# Patient Record
Sex: MALE | Race: WHITE | NOT HISPANIC OR LATINO | Employment: UNEMPLOYED | URBAN - METROPOLITAN AREA
[De-identification: names, ages, dates, MRNs, and addresses within clinical notes are randomized per-mention and may not be internally consistent; named-entity substitution may affect disease eponyms.]

---

## 2018-08-01 ENCOUNTER — NON-PROVIDER VISIT (OUTPATIENT)
Dept: URGENT CARE | Facility: PHYSICIAN GROUP | Age: 58
End: 2018-08-01
Payer: MEDICAID

## 2018-08-01 DIAGNOSIS — Z02.1 PRE-EMPLOYMENT DRUG SCREENING: ICD-10-CM

## 2018-08-01 LAB
AMP AMPHETAMINE: NORMAL
COC COCAINE: NORMAL
INT CON NEG: NORMAL
INT CON POS: NORMAL
MET METHAMPHETAMINES: NORMAL
OPI OPIATES: NORMAL
PCP PHENCYCLIDINE: NORMAL
POC DRUG COMMENT 753798-OCCUPATIONAL HEALTH: NEGATIVE
THC: NORMAL

## 2018-08-01 PROCEDURE — 80305 DRUG TEST PRSMV DIR OPT OBS: CPT | Performed by: EMERGENCY MEDICINE

## 2019-04-21 ENCOUNTER — HOSPITAL ENCOUNTER (EMERGENCY)
Facility: MEDICAL CENTER | Age: 59
End: 2019-04-21
Attending: EMERGENCY MEDICINE
Payer: MEDICAID

## 2019-04-21 ENCOUNTER — APPOINTMENT (OUTPATIENT)
Dept: RADIOLOGY | Facility: MEDICAL CENTER | Age: 59
End: 2019-04-21
Attending: EMERGENCY MEDICINE
Payer: MEDICAID

## 2019-04-21 VITALS
BODY MASS INDEX: 27.77 KG/M2 | OXYGEN SATURATION: 97 % | RESPIRATION RATE: 18 BRPM | SYSTOLIC BLOOD PRESSURE: 165 MMHG | HEART RATE: 93 BPM | WEIGHT: 194 LBS | TEMPERATURE: 97.9 F | HEIGHT: 70 IN | DIASTOLIC BLOOD PRESSURE: 112 MMHG

## 2019-04-21 DIAGNOSIS — R29.6 FALLS FREQUENTLY: Primary | ICD-10-CM

## 2019-04-21 DIAGNOSIS — R42 LIGHTHEADEDNESS: ICD-10-CM

## 2019-04-21 DIAGNOSIS — R42 DIZZINESS: ICD-10-CM

## 2019-04-21 DIAGNOSIS — S16.1XXA STRAIN OF NECK MUSCLE, INITIAL ENCOUNTER: ICD-10-CM

## 2019-04-21 LAB
ALBUMIN SERPL BCP-MCNC: 4.4 G/DL (ref 3.2–4.9)
ALBUMIN/GLOB SERPL: 1.4 G/DL
ALP SERPL-CCNC: 90 U/L (ref 30–99)
ALT SERPL-CCNC: 29 U/L (ref 2–50)
ANION GAP SERPL CALC-SCNC: 13 MMOL/L (ref 0–11.9)
AST SERPL-CCNC: 20 U/L (ref 12–45)
BASOPHILS # BLD AUTO: 1.4 % (ref 0–1.8)
BASOPHILS # BLD: 0.1 K/UL (ref 0–0.12)
BILIRUB SERPL-MCNC: 0.7 MG/DL (ref 0.1–1.5)
BUN SERPL-MCNC: 22 MG/DL (ref 8–22)
CALCIUM SERPL-MCNC: 9.7 MG/DL (ref 8.5–10.5)
CHLORIDE SERPL-SCNC: 98 MMOL/L (ref 96–112)
CO2 SERPL-SCNC: 27 MMOL/L (ref 20–33)
CREAT SERPL-MCNC: 1.08 MG/DL (ref 0.5–1.4)
EKG IMPRESSION: NORMAL
EOSINOPHIL # BLD AUTO: 0.26 K/UL (ref 0–0.51)
EOSINOPHIL NFR BLD: 3.6 % (ref 0–6.9)
ERYTHROCYTE [DISTWIDTH] IN BLOOD BY AUTOMATED COUNT: 44 FL (ref 35.9–50)
GLOBULIN SER CALC-MCNC: 3.1 G/DL (ref 1.9–3.5)
GLUCOSE BLD-MCNC: 295 MG/DL (ref 65–99)
GLUCOSE SERPL-MCNC: 301 MG/DL (ref 65–99)
HCT VFR BLD AUTO: 51.9 % (ref 42–52)
HGB BLD-MCNC: 17.2 G/DL (ref 14–18)
IMM GRANULOCYTES # BLD AUTO: 0.02 K/UL (ref 0–0.11)
IMM GRANULOCYTES NFR BLD AUTO: 0.3 % (ref 0–0.9)
LYMPHOCYTES # BLD AUTO: 1.49 K/UL (ref 1–4.8)
LYMPHOCYTES NFR BLD: 20.7 % (ref 22–41)
MCH RBC QN AUTO: 28.3 PG (ref 27–33)
MCHC RBC AUTO-ENTMCNC: 33.1 G/DL (ref 33.7–35.3)
MCV RBC AUTO: 85.4 FL (ref 81.4–97.8)
MONOCYTES # BLD AUTO: 0.41 K/UL (ref 0–0.85)
MONOCYTES NFR BLD AUTO: 5.7 % (ref 0–13.4)
NEUTROPHILS # BLD AUTO: 4.92 K/UL (ref 1.82–7.42)
NEUTROPHILS NFR BLD: 68.3 % (ref 44–72)
NRBC # BLD AUTO: 0 K/UL
NRBC BLD-RTO: 0 /100 WBC
PLATELET # BLD AUTO: 185 K/UL (ref 164–446)
PMV BLD AUTO: 11.1 FL (ref 9–12.9)
POTASSIUM SERPL-SCNC: 4.2 MMOL/L (ref 3.6–5.5)
PROT SERPL-MCNC: 7.5 G/DL (ref 6–8.2)
RBC # BLD AUTO: 6.08 M/UL (ref 4.7–6.1)
SODIUM SERPL-SCNC: 138 MMOL/L (ref 135–145)
TROPONIN I SERPL-MCNC: <0.02 NG/ML (ref 0–0.04)
WBC # BLD AUTO: 7.2 K/UL (ref 4.8–10.8)

## 2019-04-21 PROCEDURE — 82962 GLUCOSE BLOOD TEST: CPT

## 2019-04-21 PROCEDURE — 80053 COMPREHEN METABOLIC PANEL: CPT

## 2019-04-21 PROCEDURE — 85025 COMPLETE CBC W/AUTO DIFF WBC: CPT

## 2019-04-21 PROCEDURE — 84484 ASSAY OF TROPONIN QUANT: CPT

## 2019-04-21 PROCEDURE — 70450 CT HEAD/BRAIN W/O DYE: CPT

## 2019-04-21 PROCEDURE — 93005 ELECTROCARDIOGRAM TRACING: CPT | Performed by: EMERGENCY MEDICINE

## 2019-04-21 PROCEDURE — 93005 ELECTROCARDIOGRAM TRACING: CPT

## 2019-04-21 PROCEDURE — 72125 CT NECK SPINE W/O DYE: CPT

## 2019-04-21 PROCEDURE — 99284 EMERGENCY DEPT VISIT MOD MDM: CPT

## 2019-04-21 NOTE — ED PROVIDER NOTES
ED Provider Note    CHIEF COMPLAINT  Chief Complaint   Patient presents with   • Dizziness   • GLF       HPI  Marcello Perez is a 59 y.o. male who presents with dizziness and head injury.  He has had a history of dizziness where he feels like he is falling off to the side.  She talked to his doctor about it but has not had a full workup yet.  Yesterday he got dizzy felt like is going to fall over hit his head on the doorknob.  After that he did have some headache he does get some tingling up the side of his neck bilaterally.  He has no numbness tingling or weakness in his arms or legs no difficulty with speech or ambulation.  His eyes feel swollen he says he has no nausea or vomiting he does have some ringing in the ears on the left right.  He slept okay felt good this morning and had another dizzy episode came in for evaluation.  He does have diabetes on oral medications and apparently is having difficulty getting the right balance of medications for his blood sugar.  All other systems are negative    REVIEW OF SYSTEMS  See HPI for further details    PAST MEDICAL HISTORY  Past Medical History:   Diagnosis Date   • Bell's palsy    • Diabetes     diet controlled   • Hypertension    • Sleep apnea        FAMILY HISTORY  History reviewed. No pertinent family history.    SOCIAL HISTORY  Social History     Social History   • Marital status: Single     Spouse name: N/A   • Number of children: N/A   • Years of education: N/A     Social History Main Topics   • Smoking status: Never Smoker   • Smokeless tobacco: Never Used   • Alcohol use Yes      Comment: occ   • Drug use: No   • Sexual activity: Not on file     Other Topics Concern   • Not on file     Social History Narrative   • No narrative on file       SURGICAL HISTORY  Past Surgical History:   Procedure Laterality Date   • NASAL SEPTAL RECONST         CURRENT MEDICATIONS  Home Medications     Reviewed by Jasen Stephens R.N. (Registered Nurse) on 04/21/19 at  "1420  Med List Status: Not Addressed   Medication Last Dose Status   amlodipine (NORVASC) 5 MG Tab  Active   hydrocodone-acetaminophen (NORCO) 5-325 MG Tab per tablet  Active   lisinopril (PRINIVIL, ZESTRIL) 40 MG tablet  Active   spironolactone (ALDACTONE) 25 MG Tab  Active                ALLERGIES  Allergies   Allergen Reactions   • Hctz [Hydrochlorothiazide]      Rash at throat.        PHYSICAL EXAM  VITAL SIGNS: /111   Pulse (!) 105   Temp 36.6 °C (97.9 °F) (Temporal)   Resp 14   Ht 1.778 m (5' 10\")   Wt 88 kg (194 lb 0.1 oz)   SpO2 95%   BMI 27.84 kg/m²     Constitutional: Patient is alert and oriented x3 in no distress   HENT: Small abrasion in the center of the upper forehead moist mucous membranes  Eyes:   No conjunctivitis or icterus  Neck: Trach is midline on tender cervical spine  Lymphatic: Negative anterior cervical lymphadenopathy  Cardiovascular: Normal heart rate    Thorax & Lungs: Clear to auscultation  Abdomen: Soft and nontender  Neurologic: Motor 5/5 in the upper lower extremities bilaterally he has normal station and gait tandem walk normal Romberg normal finger to nose testing and normal pronator drift testing.  His cranial nerves: Pupils equally round reactive to light extraocular movements are intact he has no facial asymmetry to motor or sensation no tongue deviation and he has a symmetric palate  Extremities: Full range of motion atraumatic  Psychiatric: Affect normal, Judgment normal, Mood normal.       Results for orders placed or performed during the hospital encounter of 04/21/19   CBC WITH DIFFERENTIAL   Result Value Ref Range    WBC 7.2 4.8 - 10.8 K/uL    RBC 6.08 4.70 - 6.10 M/uL    Hemoglobin 17.2 14.0 - 18.0 g/dL    Hematocrit 51.9 42.0 - 52.0 %    MCV 85.4 81.4 - 97.8 fL    MCH 28.3 27.0 - 33.0 pg    MCHC 33.1 (L) 33.7 - 35.3 g/dL    RDW 44.0 35.9 - 50.0 fL    Platelet Count 185 164 - 446 K/uL    MPV 11.1 9.0 - 12.9 fL    Neutrophils-Polys 68.30 44.00 - 72.00 %    " Lymphocytes 20.70 (L) 22.00 - 41.00 %    Monocytes 5.70 0.00 - 13.40 %    Eosinophils 3.60 0.00 - 6.90 %    Basophils 1.40 0.00 - 1.80 %    Immature Granulocytes 0.30 0.00 - 0.90 %    Nucleated RBC 0.00 /100 WBC    Neutrophils (Absolute) 4.92 1.82 - 7.42 K/uL    Lymphs (Absolute) 1.49 1.00 - 4.80 K/uL    Monos (Absolute) 0.41 0.00 - 0.85 K/uL    Eos (Absolute) 0.26 0.00 - 0.51 K/uL    Baso (Absolute) 0.10 0.00 - 0.12 K/uL    Immature Granulocytes (abs) 0.02 0.00 - 0.11 K/uL    NRBC (Absolute) 0.00 K/uL   COMP METABOLIC PANEL   Result Value Ref Range    Sodium 138 135 - 145 mmol/L    Potassium 4.2 3.6 - 5.5 mmol/L    Chloride 98 96 - 112 mmol/L    Co2 27 20 - 33 mmol/L    Anion Gap 13.0 (H) 0.0 - 11.9    Glucose 301 (H) 65 - 99 mg/dL    Bun 22 8 - 22 mg/dL    Creatinine 1.08 0.50 - 1.40 mg/dL    Calcium 9.7 8.5 - 10.5 mg/dL    AST(SGOT) 20 12 - 45 U/L    ALT(SGPT) 29 2 - 50 U/L    Alkaline Phosphatase 90 30 - 99 U/L    Total Bilirubin 0.7 0.1 - 1.5 mg/dL    Albumin 4.4 3.2 - 4.9 g/dL    Total Protein 7.5 6.0 - 8.2 g/dL    Globulin 3.1 1.9 - 3.5 g/dL    A-G Ratio 1.4 g/dL   ESTIMATED GFR   Result Value Ref Range    GFR If African American >60 >60 mL/min/1.73 m 2    GFR If Non African American >60 >60 mL/min/1.73 m 2   ACCU-CHEK GLUCOSE   Result Value Ref Range    Glucose - Accu-Ck 295 (H) 65 - 99 mg/dL   EKG (NOW)   Result Value Ref Range    Report       [facility] Emergency Dept.    Test Date:  2019  Pt Name:    JEAN CARLOS MIKE             Department: ER  MRN:        [id number]                      Room:  Gender:     Male                         Technician: [id number]  :        1960                   Requested By:ER TRIAGE PROTOCOL  Order #:    [id number]                    Reading MD:    Measurements  Intervals                                Axis  Rate:       100                          P:          41  NE:         167                          QRS:        52  QRSD:       87                            T:          1  QT:         358  QTc:        462    Interpretive Statements  Sinus tachycardia  Compared to ECG 05/18/2012 14:40:56  Sinus rhythm no longer present  Prolonged QT interval no longer present        CT-CSPINE WITHOUT PLUS RECONS   Final Result      No CT evidence of acute cervical spine abnormality.      CT-HEAD W/O   Final Result      No acute intracranial findings.               COURSE & MEDICAL DECISION MAKING  Pertinent Labs & Imaging studies reviewed. (See chart for details)  Patient's had a chronic episode of intermittent dizziness described as falling with trauma to the head and now some neck symptoms.  CTA head and neck will be obtained to rule out trauma he is having trouble with his blood sugars I will assess his blood glucose as well.    Patient CT scan is without signs of trauma.  His lab work is unremarkable except a very slight anion gap elevation and a glucose of 301.    It is known he is having difficulty controlling his blood sugar with outpatient treatment his physicians are working on that.  He did have a fall leaning to the left today's had multiple falls over the last 6 months is talked to his doctor but no formal workup is been initiated due to the recurrence of the fall today I have ordered an outpatient MRI with and without contrast.  He will return if worsening symptoms and follow-up with his primary care physician on the results of that MRI he is given return precautions as well    FINAL IMPRESSION  1.   1. Dizziness    2. Lightheadedness    3. Strain of neck muscle, initial encounter        2.   3.         Electronically signed by: Angel Springer, 4/21/2019 3:11 PM

## 2019-04-21 NOTE — ED TRIAGE NOTES
Pt ambulatory to triage. Pt c/o dizziness that caused him to fall last night. +hit forehead, -LOC. Pt states he feels weird today. Pt does not take blood thinner. Pt states his PCP is changing his DM medication. BS has been high. FSBG 295.    Chief Complaint   Patient presents with   • Dizziness   • GLF     Pt placed in lobby, updated on triage process. Pt educated to notified RN or triage tech if changes in condition.

## 2019-04-22 NOTE — CONSULTS
"BRIEF TELEPHONE NEUROLOGY CONSULTATION NOTE:     The following is a brief summary of the phone consultation. I could not evaluate the patient myself since I did not have any face-to-face access to the patient. All examination and evaluation of the patient and information gathering from the patient and/or the family is entirely done solely by the requesting physician, Dr. Angel Springer M.D..  My recommendations were based on the information provided to me over the phone.     59 yr old w/ dizziness and L ear tinnitus ongoing for 6 months which has not been worked up yet. He fell today and came to the ED. CT head and cervical reported unremarkable. Neuro exam by Dr. Springer reported unremarkable.     Meniere's vs BPPV vs mass.     Recommendations:   - MRI brain w/ and w/o contrast as outpatient.  - F/U w/ ENT or primary.     The above was discussed with Dr. Angel Springer M.D..     Natanael Carlton MD  Acute Neurology Consultant  Tiger Text ID \"Henrique Carlton\"    "

## 2019-11-15 ENCOUNTER — TELEPHONE (OUTPATIENT)
Dept: VASCULAR LAB | Facility: MEDICAL CENTER | Age: 59
End: 2019-11-15

## 2019-11-25 ENCOUNTER — OFFICE VISIT (OUTPATIENT)
Dept: VASCULAR LAB | Facility: MEDICAL CENTER | Age: 59
End: 2019-11-25
Attending: FAMILY MEDICINE
Payer: MEDICAID

## 2019-11-25 VITALS
HEART RATE: 81 BPM | SYSTOLIC BLOOD PRESSURE: 155 MMHG | WEIGHT: 192.6 LBS | BODY MASS INDEX: 26.96 KG/M2 | HEIGHT: 71 IN | DIASTOLIC BLOOD PRESSURE: 91 MMHG

## 2019-11-25 DIAGNOSIS — G47.33 OSA (OBSTRUCTIVE SLEEP APNEA): ICD-10-CM

## 2019-11-25 DIAGNOSIS — E11.65 UNCONTROLLED TYPE 2 DIABETES MELLITUS WITH HYPERGLYCEMIA (HCC): ICD-10-CM

## 2019-11-25 DIAGNOSIS — E55.9 VITAMIN D DEFICIENCY: ICD-10-CM

## 2019-11-25 DIAGNOSIS — I1A.0 RESISTANT HYPERTENSION: ICD-10-CM

## 2019-11-25 DIAGNOSIS — E11.3553 STABLE PROLIFERATIVE DIABETIC RETINOPATHY OF BOTH EYES ASSOCIATED WITH TYPE 2 DIABETES MELLITUS (HCC): ICD-10-CM

## 2019-11-25 PROCEDURE — 99204 OFFICE O/P NEW MOD 45 MIN: CPT | Performed by: FAMILY MEDICINE

## 2019-11-25 PROCEDURE — 99212 OFFICE O/P EST SF 10 MIN: CPT | Performed by: FAMILY MEDICINE

## 2019-11-25 RX ORDER — TELMISARTAN 20 MG/1
TABLET ORAL
Refills: 0 | COMMUNITY
Start: 2019-09-11 | End: 2019-11-25

## 2019-11-25 RX ORDER — TELMISARTAN 40 MG/1
40 TABLET ORAL DAILY
COMMUNITY
End: 2019-11-25

## 2019-11-25 RX ORDER — METFORMIN HYDROCHLORIDE 500 MG/1
500 TABLET, EXTENDED RELEASE ORAL DAILY
Qty: 90 TAB | Refills: 3 | Status: SHIPPED | OUTPATIENT
Start: 2019-11-25 | End: 2020-06-19

## 2019-11-25 RX ORDER — TELMISARTAN 80 MG/1
80 TABLET ORAL
Qty: 90 TAB | Refills: 3 | Status: SHIPPED
Start: 2019-11-25 | End: 2019-12-30 | Stop reason: SDUPTHER

## 2019-11-25 RX ORDER — AMLODIPINE BESYLATE 5 MG/1
5 TABLET ORAL
Qty: 90 TAB | Refills: 3 | Status: SHIPPED | OUTPATIENT
Start: 2019-11-25 | End: 2019-12-06

## 2019-11-25 RX ORDER — CLONIDINE 0.1 MG/24H
1 PATCH, EXTENDED RELEASE TRANSDERMAL
COMMUNITY
End: 2019-11-25

## 2019-11-25 RX ORDER — GEMFIBROZIL 600 MG/1
TABLET, FILM COATED ORAL
COMMUNITY
Start: 2014-04-02 | End: 2019-11-25

## 2019-11-25 RX ORDER — HYDROCHLOROTHIAZIDE 25 MG/1
25 TABLET ORAL DAILY
COMMUNITY
End: 2019-11-25

## 2019-11-25 RX ORDER — ASPIRIN 81 MG/1
TABLET, CHEWABLE ORAL
COMMUNITY
Start: 2013-12-17 | End: 2021-03-29

## 2019-11-25 RX ORDER — CARVEDILOL 12.5 MG/1
TABLET ORAL
COMMUNITY
End: 2019-11-25

## 2019-11-25 RX ORDER — CHLORTHALIDONE 25 MG/1
25 TABLET ORAL DAILY
Qty: 90 TAB | Refills: 3 | Status: SHIPPED | OUTPATIENT
Start: 2019-11-25 | End: 2020-11-19

## 2019-11-25 ASSESSMENT — ENCOUNTER SYMPTOMS
HEMOPTYSIS: 0
BLURRED VISION: 1
DEPRESSION: 0
ORTHOPNEA: 0
WHEEZING: 0
VOMITING: 0
SHORTNESS OF BREATH: 0
CHILLS: 0
DIZZINESS: 1
NERVOUS/ANXIOUS: 1
ABDOMINAL PAIN: 0
TREMORS: 0
DIARRHEA: 0
BACK PAIN: 1
SORE THROAT: 0
MYALGIAS: 0
BRUISES/BLEEDS EASILY: 0
DOUBLE VISION: 0
EYE DISCHARGE: 0
FLANK PAIN: 1
NAUSEA: 0
SEIZURES: 0
BLOOD IN STOOL: 0
PALPITATIONS: 0
WEAKNESS: 0
HEADACHES: 0
INSOMNIA: 0
FOCAL WEAKNESS: 0
COUGH: 0
FEVER: 0

## 2019-11-25 NOTE — PROGRESS NOTES
RESISTANT HTN CLINIC - INITIAL EVALUATION   11/25/19    Assessment / Plan:     1. Blood Pressure Control:  Qualifies as resistant HTN: yes - 3+ meds w/o control   Office BP Goal ACC/AHA (2017) goal <130/80  Home BP at goal:  no  Office BP at goal:  No, definite component of white coat efect   Echo: pending  ACR: pending  Device candidate? Maybe   Plan:   Monitoring:   - start/continue home BP monitoring, reviewed correct technique:  Yes   - order 24h ABPM: 11/25/19  - monitor lytes/gfr routinely   - advised that stabilizing BP may require multiple med changes and close monitoring over the next several months, reviewed that initially there will be additional imaging and labs and the possibility of adverse drug rxn's and variability of his BP and HR until we have things stabilized.  Advised to contact our office as needed for questions or concerns.      2. Work up of Secondary Causes of Resistant Hypertension:   Renovascular HTN: Not Yet Assessed  Primary Aldosteronism: Not Yet Assessed  Thyroid Function: Not Yet Assessed  Obstructive Sleep Apnea: on CPAP, working on mask fitting for adherence   Pheochromocytoma: Not Yet Assessed   Cushing's:   Not Yet Assessed   Other:   1) anxiety - still under counseling at [facility], co-morbid with eye conditions, recent job stress    Recommendations At This Visit: labs, echo, CRISSY duplex         3. Medication Use / Adherence:  Assessment: Complete  Recommendations: Instructed to Continue Taking All Medications As Prescribed         4. End Organ Damage:   Left Ventricular Hypertrophy: Not Assessed on  Echocardiogram Date: pending  Albuminuria: unknown  on Date: pending   Renal Function: Chronic Kidney Disease  requested labs   Established Cardiovascular Disease: None    5. Lifestyle Recommendations:    Smoking: continued complete avoidance of all tobacco products     Physical Activity: continue healthy activity to improve CV fitness, see care instructions for additional details          Weight Management and Nutrition: Dietary plan was discussed with patient at this visit including DASH, low sodium and/or as outlined in care instructions     6. Standard HTN Pharmacotherapy:  ACEI/ARB: increase telmisartan to 80mg QHS  Thiazide Type Diuretic: stop HCTZ, start chlorthalidone 25mg QAM, monitor lytes  Calcium Channel Blocker (CCB): start amlodipine 5mg QHS    7. Additional Agents:  Aldosterone Antagonist: add as 4th agent   Loop Diuretic: not indicated   Peripheral Alpha Blocker: add as 5th agent  Other CCB: not indicated   Direct Vasodilator: not indicated   Centrally Acting Alpha Agonist: use clonidine 0.1mg for BP spikes >160/>110 only, will try to avoid due to potential for rebound HTN   Other:   BB: not indicated   DRI: not indicated      8. Other CV Risk Factors:     1) LIPID MANAGEMENT:  NLA Risk Category:   Very high:  T2D with 2 or more RFs or evidence of end-org damage (CKD, ACR>29, retinopathy)  Tx threshold:  non-HDL-C >99, LDL-C >69  Tx goal: non-HDL-C <100,  LDL-C <70  (optional: apoB <80)  At goal: ?   Plan:  - reinforced ongoing TLC measures   - update advanced labs  - will need to be on mod-intensity statin at next visit  - verify Vit D level stable     2) GLYCEMIA MANAGEMENT:  Diabetic, T2, ophthalmic, non-insulin   Last A1c = 7.1  Goal A1c < 7.5, optimal <7.0  ACR: unclear   Plan:  - glyxambi 10/5 daily, titrate to full strength   - defer overall mgmt to YULISA PCP  - start metformin 500mg ER, consider increase to up to 1000mg ER if tolerable   - recommmend for routine care with PCP (or endocrine) to include regular A1c monitoring, annual albumin/creatinine ratio (ACR), annual diabetic retinopathy screening, foot exams, annual flu vaccine, and updates to pneumonia vaccines as appropriate     3) ANTIPLATELET/ANTICOAGULANT THERAPY:   - not currently indicated for now       9. Other Issues:    1) DM retinopathy, proliferative  - continue care with ophthalmo for injections routinely      2) BARBARA on CPAP, having trouble with tolerance  - increasing use will help with modest reduction SBP   - defer mgmt to sleep MD      Studies Ordered At This Visit: echo, CRISSY duplex - ordered, aprn to track , 24h ABPM in 1 month (MA to track)   Blood Work to Be Obtained Prior to Next Visit: advanced lipid, cmp, acr, tsh, cortisol, konstantin/renin, UA, cbc  Disposition: RTC in 5 weeks with me     Diagnosis:  1. Resistant hypertension  US-RENAL ARTERY DUPLEX COMP    MICROALBUMIN CREAT RATIO URINE    ALDOSTERONE    CBC WITHOUT DIFFERENTIAL    CORTISOL    METANEPHRINES PLASMA    RENIN ACTIVITY    TSH WITH REFLEX TO FT4    URINALYSIS,CULTURE IF INDICATED    EC-ECHOCARDIOGRAM COMPLETE W/O CONT    REFERRAL TO 24-HOUR BLOOD PRESSURE MONITORING   2. Uncontrolled type 2 diabetes mellitus with hyperglycemia (HCC)  LipoFit by NMR    LIPOPROTEIN A    LDL, DIRECT    CRP HIGH SENSITIVE (CARDIAC)    Comp Metabolic Panel    MICROALBUMIN CREAT RATIO URINE    CORTISOL    URINALYSIS,CULTURE IF INDICATED   3. BARBARA (obstructive sleep apnea)     4. Vitamin D deficiency  VITAMIN D,25 HYDROXY   5. Stable proliferative diabetic retinopathy of both eyes associated with type 2 diabetes mellitus (HCC)          History of Present Illness:   Marcello Richardsonblay is male seen for evaluation of resistant HTN and management. Marcello Richardsonblay is referred by: Owen Valles D.O..    HTN:  Current HTN concerns: ongoing elevated BP despite multiple medications   Current ADRs: No  HTN sx:  Ongoing blurred vision due to underlying DR.  No chest pain, shortness of breath, headache, nausea.  Occasional intermittent vertigo   Home BP los/80s  24h ABPM completed: not tested  Adherence to current HTN meds: compliant all of the time     Antiplatelet/anticoagulation:   No     Hyperlipidemia:    No prior statin, reports HDL has been good in the past. No prior ASCVD  Current treatment: TLC only  Myalgias? Not applicable  Other adverse drug reactions?  Not applicable  Lipid profile: no recent labs available at time of visit    T2D:  Overall stable with diet, exercise.   Currently off Tresiba.   Eye injections Q6wks (Dr. Elizabeth)  Stable. No current symptoms reported.   Tolerating meds and no recent med changes.   Home blood sugars stable, reports no lows.   Last A1c 7.1%    Pertinent HTN hx (reviewed at initial visit):   Age at HTN dx: [age]  Past HTN medications: lisino, spirono/hctz  HTN crises:  No prior hospitalization or crises, mostly DM related    Lifestyle factors:     High salt: Yes, Details: regular, trying w/o salt.  no table salt, uses salt in cooking    EtOH: Yes, Details: social only  Interfering substances:     NSAIDs: No    Decongestants. No   Stimulants/drugs: No    Clinical evidence of ASCVD:     1) hx of MI/ACS:  no   2) coronary or other revascularization procedure: no   3) TIA/ischemic CVA: no   4) PAD (including BESSIE <0.9): no   5) documented (CAD, Renal athero, AA due to athero, carotid plaque >50% stenosis: no    Major RFs:     1) Age (Men>44, women>54):  yes   2) Fhx early CAD (<55 men, <65 women):  no   3) cigarette smoking:   reports that he has never smoked. He has never used smokeless tobacco.   4) high BP >139/89 or on tx: yes   5) Low HDL-C (<40 men, <50 women): yes , in past   HDL   Date Value Ref Range Status   04/22/2015 28 (L) >39 mg/dL Final     Other ASCVD risk factors:     CKD:  No   Sleeping disorder/BARBARA: Yes, Details: on CPAP   Hypothyroidism: No     Past Medical History:   Diagnosis Date   • Bell's palsy    • Diabetes     diet controlled   • Hypertension    • Sleep apnea       Problem List:     Patient Active Problem List    Diagnosis Date Noted   • BARBARA (obstructive sleep apnea) 04/22/2015   • Uncontrolled type 2 diabetes mellitus with hyperglycemia (HCC) 04/22/2015   • HTN (hypertension) 04/06/2015        Surgical History:     Past Surgical History:   Procedure Laterality Date   • NASAL SEPTAL RECONST          Social  History:     Social History     Socioeconomic History   • Marital status: Single     Spouse name: Not on file   • Number of children: Not on file   • Years of education: Not on file   • Highest education level: Not on file   Occupational History   • Not on file   Social Needs   • Financial resource strain: Not on file   • Food insecurity:     Worry: Not on file     Inability: Not on file   • Transportation needs:     Medical: Not on file     Non-medical: Not on file   Tobacco Use   • Smoking status: Never Smoker   • Smokeless tobacco: Never Used   Substance and Sexual Activity   • Alcohol use: Yes     Comment: occ   • Drug use: No   • Sexual activity: Not on file   Lifestyle   • Physical activity:     Days per week: Not on file     Minutes per session: Not on file   • Stress: Not on file   Relationships   • Social connections:     Talks on phone: Not on file     Gets together: Not on file     Attends Cheondoism service: Not on file     Active member of club or organization: Not on file     Attends meetings of clubs or organizations: Not on file     Relationship status: Not on file   • Intimate partner violence:     Fear of current or ex partner: Not on file     Emotionally abused: Not on file     Physically abused: Not on file     Forced sexual activity: Not on file   Other Topics Concern   • Not on file   Social History Narrative   • Not on file        Family History:     Family History   Problem Relation Age of Onset   • Clotting Disorder Neg Hx    • Stroke Neg Hx    • Heart Disease Neg Hx         Review of Systems:   Review of Systems   Constitutional: Negative for chills, fever and malaise/fatigue.   HENT: Negative for nosebleeds, sore throat and tinnitus.    Eyes: Positive for blurred vision (baseline ). Negative for double vision and discharge.   Respiratory: Negative for cough, hemoptysis, shortness of breath and wheezing.    Cardiovascular: Negative for chest pain, palpitations, orthopnea and leg swelling.  "  Gastrointestinal: Negative for abdominal pain, blood in stool, diarrhea, melena, nausea and vomiting.   Genitourinary: Positive for flank pain. Negative for hematuria.   Musculoskeletal: Positive for back pain. Negative for joint pain and myalgias.   Skin: Negative for itching and rash.   Neurological: Positive for dizziness. Negative for tremors, focal weakness, seizures, weakness and headaches.   Endo/Heme/Allergies: Does not bruise/bleed easily.   Psychiatric/Behavioral: Negative for depression. The patient is nervous/anxious (basline). The patient does not have insomnia.         Objective:   Allergies:  Hctz [hydrochlorothiazide]    Vitals:    11/25/19 0817 11/25/19 0823   BP: 157/91 155/91   BP Location: Left arm Left arm   Patient Position: Sitting Sitting   BP Cuff Size: Adult Adult   Pulse: 73 81   Weight: 87.4 kg (192 lb 9.6 oz)    Height: 1.803 m (5' 11\")      Body mass index is 26.86 kg/m².    BP:   BP Readings from Last 5 Encounters:   11/25/19 155/91   04/21/19 (!) 165/112   09/22/16 (!) 180/99   12/22/15 142/93   12/17/15 136/94        Outpatient Encounter Medications as of 11/25/2019   Medication Sig Dispense Refill   • Empagliflozin-linaGLIPtin (GLYXAMBI) 10-5 MG Tab Take  by mouth.     • aspirin (ASPIRIN 81) 81 MG Chew Tab chewable tablet 1 tablet     • Glucose Blood (FREESTYLE LITE TEST VI) to check     • metFORMIN ER (GLUCOPHAGE XR) 500 MG TABLET SR 24 HR Take 1 Tab by mouth every day for 360 days. 90 Tab 3   • telmisartan (MICARDIS) 80 MG Tab Take 1 Tab by mouth every bedtime for 360 days. 90 Tab 3   • chlorthalidone (HYGROTON) 25 MG Tab Take 1 Tab by mouth every day for 360 days. 90 Tab 3   • amLODIPine (NORVASC) 5 MG Tab Take 1 Tab by mouth every bedtime for 360 days. 90 Tab 3   • [DISCONTINUED] cloNIDine (CATAPRES) 0.1 MG/24HR PATCH WEEKLY patch Apply 1 Patch to skin as directed every 7 days.     • [DISCONTINUED] telmisartan (MICARDIS) 40 MG Tab Take 40 mg by mouth every day.     • " [DISCONTINUED] hydroCHLOROthiazide (HYDRODIURIL) 25 MG Tab Take 25 mg by mouth every day.     • [DISCONTINUED] carvedilol (COREG) 12.5 MG Tab 1     • [DISCONTINUED] metformin (GLUCOPHAGE) 1000 MG tablet metformin     • [DISCONTINUED] Insulin Lispro Prot & Lispro (HUMALOG MIX 50/50 KWIKPEN) (50-50) 100 UNIT/ML Suspension Pen-injector 15 units; increase every 3 days by 1 unit bid until am fasting bgs are consistenly in 120's or less     • [DISCONTINUED] Insulin Pen Needle 29G X 12.7MM Misc use with kwikpen     • [DISCONTINUED] gemfibrozil (LOPID) 600 MG Tab 1 tablet     • [DISCONTINUED] Spironolactone-HCTZ (ALDACTAZIDE PO) spironolacton-hydrochlorothiaz     • [DISCONTINUED] telmisartan (MICARDIS) 20 MG tablet TAKE ONE TABLET BY MOUTH EVERY MORNING FOR BLOOD PRESSURE  0   • [DISCONTINUED] hydrocodone-acetaminophen (NORCO) 5-325 MG Tab per tablet Take 1 Tab by mouth every 6 hours as needed. (Patient not taking: Reported on 11/25/2019) 10 Tab 0   • [DISCONTINUED] lisinopril (PRINIVIL, ZESTRIL) 40 MG tablet Take 0.5 Tabs by mouth 2 times a day. Hold for SBP <100 (Patient not taking: Reported on 11/25/2019) 90 Tab 1   • [DISCONTINUED] amlodipine (NORVASC) 5 MG Tab Take 1 Tab by mouth every day. 30 Tab 11   • [DISCONTINUED] spironolactone (ALDACTONE) 25 MG Tab Take 0.5 Tabs by mouth every day. (Patient not taking: Reported on 11/25/2019) 45 Tab 3     No facility-administered encounter medications on file as of 11/25/2019.      Physical Exam  Vitals signs reviewed.   Constitutional:       Appearance: Normal appearance.   HENT:      Head: Normocephalic and atraumatic.      Nose: Nose normal.      Mouth/Throat:      Mouth: Mucous membranes are moist.      Pharynx: Oropharynx is clear.   Eyes:      Extraocular Movements: Extraocular movements intact.      Conjunctiva/sclera: Conjunctivae normal.   Neck:      Musculoskeletal: Normal range of motion and neck supple.   Cardiovascular:      Rate and Rhythm: Normal rate and regular  rhythm.      Pulses: Normal pulses.           Carotid pulses are 2+ on the right side and 2+ on the left side.       Radial pulses are 2+ on the right side and 2+ on the left side.        Dorsalis pedis pulses are 2+ on the right side and 2+ on the left side.        Posterior tibial pulses are 2+ on the right side and 2+ on the left side.      Heart sounds: Normal heart sounds.      Comments:    Spider telangectasia:       RLE:  Scattered     LLE: scattered   Varicosities:           RLE: none      LLE: reticular only    Corona phlebectatica:      RLE:  None        LLE:  None   Cording:         RLE:  None     LLE: None     Pulmonary:      Effort: Pulmonary effort is normal.      Breath sounds: Normal breath sounds.   Abdominal:      General: Abdomen is flat. Bowel sounds are normal.      Palpations: Abdomen is soft.   Musculoskeletal:      Right lower leg: No edema.      Left lower leg: No edema.   Skin:     General: Skin is warm and dry.      Capillary Refill: Capillary refill takes less than 2 seconds.          Neurological:      General: No focal deficit present.      Mental Status: He is alert and oriented to person, place, and time. Mental status is at baseline.   Psychiatric:         Mood and Affect: Mood normal.         Behavior: Behavior normal.          Accessory Clinical Findings:   Echocardiogram:  No results found for this or any previous visit.            Lab Results   Component Value Date    SODIUM 138 2019    POTASSIUM 4.2 2019    CHLORIDE 98 2019    CO2 27 2019    GLUCOSE 301 (H) 2019    BUN 22 2019    CREATININE 1.08 2019           Lab Results   Component Value Date    STMTRPT  2019     [facility] Emergency Dept.    Test Date:  2019  Pt Name:    JEAN CARLOS MIKE             Department: ER  MRN:        [id number]                      Room:  Gender:     Male                         Technician: [id number]  :        1960                    Requested By:ER TRIAGE PROTOCOL  Order #:    [id number]                    Reading MD:    Measurements  Intervals                                Axis  Rate:       100                          P:          41  VA:         167                          QRS:        52  QRSD:       87                           T:          1  QT:         358  QTc:        462    Interpretive Statements  Sinus tachycardia  Compared to ECG 05/18/2012 14:40:56  Sinus rhythm no longer present  Prolonged QT interval no longer present           Alonso Dover M.D.

## 2019-11-25 NOTE — PATIENT INSTRUCTIONS
1) start new meds   2) check labs when fasting   3) heart and kidney ultrasound     Physical activity:  - engage in moderate to vigorous physical activity such as brisk walking, swimming, cycling, >150 minutes per week at 30-40 minutes per session, 3 to 5 times weekly or as directed at today's visit     General healthly nutrition advice:  - the USDA food pattern (https://www.cnpp.usda.gov/USDAFoodPatterns)  - plate method (https://www.choosemyplate.gov/)  - consume diet that emphasizes intake of vegetables, fruits, and whole grains,  - use low fat diary products, poultry, fish, legumes, nontropical vegetable oils, nuts  - limit intake of sweets, sugar-sweetned beverages, and red meats   - reduce saturated and trans fats to <6% of your daily calories     BP and BP lower diet:  SODIUM RESTRICTIONS: - consume no more than 2,300mg of sodium daily (look at food labels) ,or if you have high BP then reduce to no more than 1,500mg of sodium daily   For more information visit: https://www.Clerk/contents/low-sodium-diet-the-basics/print?veilsFby=5823&source=see_link     - if you notice BP > 140/>90 for more than 3 days, then contact our office for further instructions    - DASH diet   (https://www.heart.org/en/health-topics/high-blood-pressure/changes-you-can-make-to-manage-high-blood-pressure/managing-blood-pressure-with-a-heart-healthy-diet)    Cholesterol-reducing diets:   - AHA diet  (http://www.heart.org/en/healthy-living/healthy-eating/eat-smart/nutrition-basics/aha-diet-and-lifestyle-recommendations)   - Mediterranean diet (http://www.heart.org/en/healthy-living/healthy-eating/eat-smart/nutrition-basics/mediterranean-diet)   - lowering triglycerides: (http://my.americanheart.org/idc/groups/ahamah-public/@[facility]/@sop/@[facility]/documents/downloadable/[facility]_425988.pdf)

## 2019-12-03 ENCOUNTER — TELEPHONE (OUTPATIENT)
Dept: VASCULAR LAB | Facility: MEDICAL CENTER | Age: 59
End: 2019-12-03

## 2019-12-06 ENCOUNTER — TELEPHONE (OUTPATIENT)
Dept: VASCULAR LAB | Facility: MEDICAL CENTER | Age: 59
End: 2019-12-06

## 2019-12-07 NOTE — TELEPHONE ENCOUNTER
Received a msg from the pt that since starting amlodipine, has had major swelling in legs, and perhaps even some swelling in his arms.  He stopped amlodipine yesterday, and already the swelling has subsided.  He states his BPs have been running 110-120/70s, 112/75 this AM.  Pt instructed to continue to hold amlodipine and to call us on Monday with BP readings.  He is in agreement.      PRADIP Tobar  [place] [facility]

## 2019-12-12 ENCOUNTER — TELEPHONE (OUTPATIENT)
Dept: VASCULAR LAB | Facility: MEDICAL CENTER | Age: 59
End: 2019-12-12

## 2019-12-12 NOTE — TELEPHONE ENCOUNTER
LM for pt to call back to discuss BP and meds.        PRADIP Tobar  [place] [facility]

## 2019-12-17 ENCOUNTER — NON-PROVIDER VISIT (OUTPATIENT)
Dept: VASCULAR LAB | Facility: MEDICAL CENTER | Age: 59
End: 2019-12-17
Attending: FAMILY MEDICINE
Payer: MEDICAID

## 2019-12-17 DIAGNOSIS — I15.9 SECONDARY HYPERTENSION: ICD-10-CM

## 2019-12-17 PROCEDURE — 93786 AMBL BP MNTR W/SW REC ONLY: CPT

## 2019-12-17 PROCEDURE — 93788 AMBL BP MNTR W/SW A/R: CPT

## 2019-12-17 PROCEDURE — 93790 AMBL BP MNTR W/SW I&R: CPT | Performed by: INTERNAL MEDICINE

## 2019-12-17 NOTE — NON-PROVIDER
ABPM placed at 0920. Pt will return machine tomorrow around 0930 to ensure we got a full 24 hr reading

## 2019-12-19 NOTE — PROGRESS NOTES
Ambulatory blood pressure monitor results reviewed  Full report under media tab  Reasonable data acquisition    Mean daytime: 117/85 consistent with suboptimally controlled out of office diastolic blood pressure    Nocturnal dip: Appears somewhat blunted    Clinical correlation needed.  We will discuss with patient at follow-up visit    Michael Bloch, MD  [facility]

## 2019-12-30 RX ORDER — TELMISARTAN 80 MG/1
80 TABLET ORAL
Qty: 90 TAB | Refills: 3 | Status: SHIPPED | OUTPATIENT
Start: 2019-12-30 | End: 2020-12-24

## 2020-01-02 ENCOUNTER — OFFICE VISIT (OUTPATIENT)
Dept: VASCULAR LAB | Facility: MEDICAL CENTER | Age: 60
End: 2020-01-02
Attending: FAMILY MEDICINE
Payer: MEDICAID

## 2020-01-02 VITALS
WEIGHT: 192.2 LBS | SYSTOLIC BLOOD PRESSURE: 131 MMHG | HEIGHT: 71 IN | HEART RATE: 81 BPM | BODY MASS INDEX: 26.91 KG/M2 | DIASTOLIC BLOOD PRESSURE: 92 MMHG

## 2020-01-02 DIAGNOSIS — E11.65 UNCONTROLLED TYPE 2 DIABETES MELLITUS WITH HYPERGLYCEMIA (HCC): ICD-10-CM

## 2020-01-02 DIAGNOSIS — E11.3553 STABLE PROLIFERATIVE DIABETIC RETINOPATHY OF BOTH EYES ASSOCIATED WITH TYPE 2 DIABETES MELLITUS (HCC): ICD-10-CM

## 2020-01-02 DIAGNOSIS — E55.9 VITAMIN D DEFICIENCY: ICD-10-CM

## 2020-01-02 DIAGNOSIS — G47.33 OSA (OBSTRUCTIVE SLEEP APNEA): ICD-10-CM

## 2020-01-02 DIAGNOSIS — I1A.0 RESISTANT HYPERTENSION: ICD-10-CM

## 2020-01-02 DIAGNOSIS — E78.5 DYSLIPIDEMIA: ICD-10-CM

## 2020-01-02 PROCEDURE — 99214 OFFICE O/P EST MOD 30 MIN: CPT | Performed by: FAMILY MEDICINE

## 2020-01-02 PROCEDURE — 99212 OFFICE O/P EST SF 10 MIN: CPT | Performed by: FAMILY MEDICINE

## 2020-01-02 ASSESSMENT — ENCOUNTER SYMPTOMS
SHORTNESS OF BREATH: 0
COUGH: 0
NAUSEA: 0
NERVOUS/ANXIOUS: 1
INSOMNIA: 0
TREMORS: 0
BRUISES/BLEEDS EASILY: 0
ORTHOPNEA: 0
DIZZINESS: 1
VOMITING: 0
BLURRED VISION: 1
EYE DISCHARGE: 0
HEADACHES: 0
SEIZURES: 0
FLANK PAIN: 0
FEVER: 0
DIARRHEA: 0
PALPITATIONS: 0
DOUBLE VISION: 0
CHILLS: 0
SORE THROAT: 0
WHEEZING: 0
HEMOPTYSIS: 0
WEAKNESS: 0
BACK PAIN: 1
FOCAL WEAKNESS: 0
MYALGIAS: 0
BLOOD IN STOOL: 0
DEPRESSION: 0
ABDOMINAL PAIN: 0

## 2020-01-02 NOTE — PATIENT INSTRUCTIONS
1) take OTC vitamin D3 50,000 units once weekly for 3 months, then transition to vit D3 4,000 units daily   2) check labs in 3 months   3) continue current meds     Blood pressure and BP-lowering diet:  If you are being treated for blood pressure today: please be advised that stabilizing BP may require multiple med changes and close monitoring over the next several months and initially there may be additional imaging and labs and the possibility of adverse drug reactions. Variability of your blood pressure and heart rate may occur until we have things stabilized.  Please feel free to contact our office as needed for questions or concerns.      SODIUM RESTRICTIONS: - consume no more than 2,300mg of sodium daily (look at food labels) ,or if you have high BP then reduce to no more than 1,500mg of sodium daily   For more information visit: https://www.Pure[employer]/contents/low-sodium-diet-the-basics/print?kpufrGyu=1222&source=see_link     DASH diet   (https://www.heart.org/en/health-topics/high-blood-pressure/changes-you-can-make-to-manage-high-blood-pressure/managing-blood-pressure-with-a-heart-healthy-diet)    - if you notice BP > 140/>90 for more than 3 days, then contact our office for further instructions    Cholesterol-reducing diets:   - AHA diet  (http://www.heart.org/en/healthy-living/healthy-eating/eat-smart/nutrition-basics/aha-diet-and-lifestyle-recommendations)   - Mediterranean diet (http://www.heart.org/en/healthy-living/healthy-eating/eat-smart/nutrition-basics/mediterranean-diet)   - lowering triglycerides: (http://my.americanheart.org/idc/groups/ahamah-public/@wcm/@sop/@[facility]/documents/downloadable/[facility]_425988.pdf)     Physical activity: Unless directed otherwise at today's visit or you are physically incapable due to your current health or medical conditions, it is advised per the American Heart Association to engage in moderate to vigorous physical activity such as brisk walking, swimming, cycling, >150  minutes per week at 30-40 minutes per session, 3 to 5 times weekly.      General healthly nutrition advice:  - the USDA food pattern (https://www.cnpp.usda.gov/USDAFoodPatterns)  - plate method (https://www.choosemyplate.gov/)  - consume diet that emphasizes intake of vegetables, fruits, and whole grains,  - use low fat diary products, poultry, fish, legumes, nontropical vegetable oils, nuts  - limit intake of sweets, sugar-sweetned beverages, and red meats   - reduce saturated and trans fats to <6% of your daily calories

## 2020-01-02 NOTE — NON-PROVIDER
"**Has not completed echo nor US** provided him w/ copy of orders    Pt experiencing hot flashes, blurry vision and tingling sensation in temples/ fingers since starting 80mg telmisartan    Pt states that since starting metformin blood sugars have been going up rather than down. Fasting numbers are usually 135 w/out the metformin. Now they are a little over 260.    Hears like a \"swishing\" noise in ears   "

## 2020-01-02 NOTE — PROGRESS NOTES
RESISTANT HTN CLINIC- FOLLOW-UP EVALUATION   1/2/20    Assessment / Plan:     1. Blood Pressure Control:  Qualifies as resistant HTN: yes - 3+ meds w/o control   Office BP Goal ACC/AHA (2017) goal <130/80  Home BP at goal:  no  Office BP at goal:  No, elevated DBP, definite white coat effect noted    24h ABPM (12/2019): Reasonable data acquisition. Mean daytime: 117/85 consistent with suboptimally controlled out of office diastolic blood pressure. Nocturnal dip: Appears somewhat blunted  Echo: ordered, pending  ACR: A1  Device candidate? Maybe   Plan:   Monitoring:   - continue home BP monitoring, reviewed correct technique:  Yes   - monitor lytes/gfr routinely   - advised that stabilizing BP may require multiple med changes and close monitoring over the next several months, reviewed that initially there will be additional imaging and labs and the possibility of adverse drug rxn's and variability of his BP and HR until we have things stabilized.  Advised to contact our office as needed for questions or concerns.      2. Work up of Secondary Causes of Resistant Hypertension:   Renovascular HTN: Not Yet Assessed  Primary Aldosteronism: Not Yet Assessed  Thyroid Function: Not Yet Assessed  Obstructive Sleep Apnea: on CPAP, working on mask fitting for adherence   Pheochromocytoma: Not Yet Assessed   Cushing's:   Not Yet Assessed   Other:   1) anxiety - still under counseling at [facility], co-morbid with eye conditions, recent job stress    Recommendations At This Visit: labs, echo, CRISSY duplex         3. Medication Use / Adherence:  Assessment: Complete  Recommendations: Instructed to Continue Taking All Medications As Prescribed         4. End Organ Damage:   Left Ventricular Hypertrophy: Not Assessed on  Echocardiogram Date: pending  Albuminuria: unknown  on Date: pending   Renal Function: Chronic Kidney Disease  requested labs   Established Cardiovascular Disease: None    5. Lifestyle Recommendations:    Smoking: continued  complete avoidance of all tobacco products     Physical Activity: continue healthy activity to improve CV fitness, see care instructions for additional details     Weight Management and Nutrition: Dietary plan was discussed with patient at this visit including DASH, low sodium and/or as outlined in care instructions     6. Standard HTN Pharmacotherapy:  ACEI/ARB: increase telmisartan to 80mg QHS, monitor for recurrent ADRs and consider dosage reduction   Thiazide Type Diuretic: continue chlorthalidone 25mg QAM, monitor lytes, previously on HCTZ  Calcium Channel Blocker (CCB): continue amlodipine 5mg QHS    7. Additional Agents:  Aldosterone Antagonist: add as 4th agent   Loop Diuretic: not indicated   Peripheral Alpha Blocker: add as 5th agent  Other CCB: not indicated   Direct Vasodilator: not indicated   Centrally Acting Alpha Agonist: use clonidine 0.1mg for BP spikes >160/>110 only, will try to avoid due to potential for rebound HTN   Other:   BB: not indicated   DRI: not indicated      8. Other CV Risk Factors:     1) LIPID MANAGEMENT:  Guidlelines recommend at least mod-intensity statin  Currently on statin: No   Lp(a) normal   hsCRP 1.8 (avg risk)  LDL-P 1282, small LDL-P 956, high risk LDL-P size (LDL-P/LDL-C ratio discordant)  - focus on LDL-P reduction and apoB due to discordance to LDL-C   Direct LDL 65  NLA Risk Category:   Very high:  T2D with 2 or more RFs or evidence of end-org damage (CKD, ACR>29, retinopathy)  Tx threshold:  non-HDL-C >99, LDL-C >69  Tx goal: non-HDL-C <100,  LDL-C <70  (optional: apoB <80)  At goal: ?   Plan:  - reinforced ongoing TLC measures   -- will need to be on mod-intensity statin at next visit after Vit D repletion completed   - continue vitamin D3 5,000 units indefinitely if statin initiated     2) GLYCEMIA MANAGEMENT:  Diabetic, T2, ophthalmic complications, non-insulin   Last A1c = 7.1  Goal A1c < 7.5, optimal <7.0  ACR: A1  Plan:  - glyxambi 10/5 daily, titrate to full  strength   - defer overall mgmt to [facility]UL[facility] PCP  - increase to metformin 750mg ER daily, increase to 500mg ER 2 tab, then 750mg ER 2 tab   - recommmend for routine care with PCP (or endocrine) to include regular A1c monitoring, annual albumin/creatinine ratio (ACR), annual diabetic retinopathy screening, foot exams, annual flu vaccine, and updates to pneumonia vaccines as appropriate     3) ANTIPLATELET/ANTICOAGULANT THERAPY:   - not currently indicated for now       9. Other Issues:    1) DM retinopathy, proliferative  - continue care with ophthalmo for injections routinely     2) BARBARA on CPAP, having trouble with tolerance, overall stable   - increasing use will help with modest reduction SBP   - defer mgmt to sleep MD      3) vit D deficiency, low   - start vit D3 50k weekly for next 3 months, then Vit D3 5,000 units daily     Studies Ordered At This Visit: echo, CRISSY duplex - ordered, aprn to track , - pending   Blood Work to Be Obtained Prior to Next Visit:  Vitamin D, CMP, A1c, apoB  Disposition: 3mo with me     Diagnosis:  1. Resistant hypertension  CBC WITHOUT DIFFERENTIAL   2. Uncontrolled type 2 diabetes mellitus with hyperglycemia (HCC)  HEMOGLOBIN A1C    HEMOGLOBIN A1C   3. BARBARA (obstructive sleep apnea)     4. Vitamin D deficiency  VITAMIN D,25 HYDROXY   5. Stable proliferative diabetic retinopathy of both eyes associated with type 2 diabetes mellitus (HCC)     6. Dyslipidemia  APOLIPOPROTEIN B    Comp Metabolic Panel    APOLIPOPROTEIN B        History of Present Illness:   Marcello Perez is male seen for evaluation of resistant HTN and management. Marcello Perez is referred by: Owen Valles D.O..    HTN  Current HTN concerns: **Has not completed echo nor US** provided him w/ copy of orders to call to schedule    Current ADRs: Pt experiencing hot flashes, blurry vision and tingling sensation in temples/ fingers since starting 80mg telmisartan.   HTN sx:  Ongoing blurred vision due to  underlying DR.  No chest pain, shortness of breath, headache, nausea.  Occasional intermittent vertigo   Home BP los/80s  24h ABPM completed: not tested  Adherence to current HTN meds: compliant all of the time     Antiplatelet/anticoagulation:   No     Hyperlipidemia:    No prior statin, reports HDL has been good in the past. No prior ASCVD  Current treatment: TLC only  Myalgias? Not applicable  Other adverse drug reactions? Not applicable  Lipid profile: labs done as noted below     T2D:  Pt states that since starting metformin blood sugars have been going up rather than down. Fasting numbers are usually 135 w/out the metformin. Now they are a little over 260.  Gets some bloating feeling.   Currently off Tresiba.   Eye injections Q6wks (Dr. Elizabeth)  Stable. No current symptoms reported.   Tolerating meds and no recent med changes.   Home blood sugars stable, reports no lows.   Last A1c 7.1%    Pertinent HTN hx (reviewed at initial visit):   Age at HTN dx:   Past HTN medications: lisino, spirono/hctz  HTN crises:  No prior hospitalization or crises, mostly DM related    Lifestyle factors:     High salt: Yes, Details: regular, trying w/o salt.  no table salt, uses salt in cooking    EtOH: Yes, Details: social only  Interfering substances:     NSAIDs: No    Decongestants. No   Stimulants/drugs: No    Clinical evidence of ASCVD:     1) hx of MI/ACS:  no   2) coronary or other revascularization procedure: no   3) TIA/ischemic CVA: no   4) PAD (including BESSIE <0.9): no   5) documented (CAD, Renal athero, AA due to athero, carotid plaque >50% stenosis: no    Major RFs:     1) Age (Men>44, women>54):  yes   2) Fhx early CAD (<55 men, <65 women):  no   3) cigarette smoking:   reports that he has never smoked. He has never used smokeless tobacco.   4) high BP >139/89 or on tx: yes   5) Low HDL-C (<40 men, <50 women): yes , in past   HDL   Date Value Ref Range Status   2015 28 (L) >39 mg/dL Final     Other  ASCVD risk factors:     CKD:  No   Sleeping disorder/BARBARA: Yes, Details: on CPAP   Hypothyroidism: No      Social History:     Social History     Socioeconomic History   • Marital status: Single     Spouse name: Not on file   • Number of children: Not on file   • Years of education: Not on file   • Highest education level: Not on file   Occupational History   • Not on file   Social Needs   • Financial resource strain: Not on file   • Food insecurity:     Worry: Not on file     Inability: Not on file   • Transportation needs:     Medical: Not on file     Non-medical: Not on file   Tobacco Use   • Smoking status: Never Smoker   • Smokeless tobacco: Never Used   Substance and Sexual Activity   • Alcohol use: Yes     Comment: occ   • Drug use: No   • Sexual activity: Not on file   Lifestyle   • Physical activity:     Days per week: Not on file     Minutes per session: Not on file   • Stress: Not on file   Relationships   • Social connections:     Talks on phone: Not on file     Gets together: Not on file     Attends Advent service: Not on file     Active member of club or organization: Not on file     Attends meetings of clubs or organizations: Not on file     Relationship status: Not on file   • Intimate partner violence:     Fear of current or ex partner: Not on file     Emotionally abused: Not on file     Physically abused: Not on file     Forced sexual activity: Not on file   Other Topics Concern   • Not on file   Social History Narrative   • Not on file        Review of Systems:   Review of Systems   Constitutional: Negative for chills, fever and malaise/fatigue.   HENT: Negative for nosebleeds, sore throat and tinnitus.    Eyes: Positive for blurred vision (baseline ). Negative for double vision and discharge.   Respiratory: Negative for cough, hemoptysis, shortness of breath and wheezing.    Cardiovascular: Negative for chest pain, palpitations, orthopnea and leg swelling.   Gastrointestinal: Negative for  "abdominal pain, blood in stool, diarrhea, melena, nausea and vomiting.   Genitourinary: Negative for flank pain and hematuria.   Musculoskeletal: Positive for back pain (chronic ). Negative for joint pain and myalgias.   Skin: Negative for itching and rash.   Neurological: Positive for dizziness. Negative for tremors, focal weakness, seizures, weakness and headaches.   Endo/Heme/Allergies: Does not bruise/bleed easily.   Psychiatric/Behavioral: Negative for depression. The patient is nervous/anxious (basline). The patient does not have insomnia.         Objective:   Allergies:  Hctz [hydrochlorothiazide]    Vitals:    01/02/20 0942 01/02/20 1002   BP: 138/98 131/92   BP Location: Left arm Left arm   Patient Position: Sitting Sitting   BP Cuff Size: Adult Adult   Pulse: 85 81   Weight: 87.2 kg (192 lb 3.2 oz)    Height: 1.803 m (5' 11\")      Body mass index is 26.81 kg/m².    BP:   BP Readings from Last 5 Encounters:   01/02/20 131/92   11/25/19 155/91   04/21/19 (!) 165/112   09/22/16 (!) 180/99   12/22/15 142/93        Outpatient Encounter Medications as of 1/2/2020   Medication Sig Dispense Refill   • Cholecalciferol (VITAMIN D3) 23475 units Tab Take 1 Capsule by mouth every 7 days.     • telmisartan (MICARDIS) 80 MG Tab Take 1 Tab by mouth every bedtime for 360 days. 90 Tab 3   • Empagliflozin-linaGLIPtin (GLYXAMBI) 10-5 MG Tab Take  by mouth.     • Glucose Blood (FREESTYLE LITE TEST VI) to check     • metFORMIN ER (GLUCOPHAGE XR) 500 MG TABLET SR 24 HR Take 1 Tab by mouth every day for 360 days. 90 Tab 3   • chlorthalidone (HYGROTON) 25 MG Tab Take 1 Tab by mouth every day for 360 days. 90 Tab 3   • aspirin (ASPIRIN 81) 81 MG Chew Tab chewable tablet 1 tablet       No facility-administered encounter medications on file as of 1/2/2020.      Physical Exam  Vitals signs reviewed.   Constitutional:       Appearance: Normal appearance.   HENT:      Head: Normocephalic and atraumatic.      Nose: Nose normal.      " Mouth/Throat:      Mouth: Mucous membranes are moist.      Pharynx: Oropharynx is clear.   Eyes:      Extraocular Movements: Extraocular movements intact.      Conjunctiva/sclera: Conjunctivae normal.   Neck:      Musculoskeletal: Normal range of motion and neck supple.   Cardiovascular:      Rate and Rhythm: Normal rate and regular rhythm.      Pulses: Normal pulses.           Carotid pulses are 2+ on the right side and 2+ on the left side.       Radial pulses are 2+ on the right side and 2+ on the left side.        Dorsalis pedis pulses are 2+ on the right side and 2+ on the left side.        Posterior tibial pulses are 2+ on the right side and 2+ on the left side.      Heart sounds: Normal heart sounds.      Comments:    Spider telangectasia:       RLE:  Scattered     LLE: scattered   Varicosities:           RLE: none      LLE: reticular only    Corona phlebectatica:      RLE:  None        LLE:  None   Cording:         RLE:  None     LLE: None     Pulmonary:      Effort: Pulmonary effort is normal.      Breath sounds: Normal breath sounds.   Abdominal:      General: Abdomen is flat. Bowel sounds are normal.      Palpations: Abdomen is soft.   Musculoskeletal:      Right lower leg: No edema.      Left lower leg: No edema.   Skin:     General: Skin is warm and dry.      Capillary Refill: Capillary refill takes less than 2 seconds.          Neurological:      General: No focal deficit present.      Mental Status: He is alert and oriented to person, place, and time. Mental status is at baseline.   Psychiatric:         Mood and Affect: Mood normal.         Behavior: Behavior normal.          Accessory Clinical Findings:   Echocardiogram:  No results found for this or any previous visit.            Lab Results   Component Value Date    SODIUM 138 04/21/2019    POTASSIUM 4.2 04/21/2019    CHLORIDE 98 04/21/2019    CO2 27 04/21/2019    GLUCOSE 301 (H) 04/21/2019    BUN 22 04/21/2019    CREATININE 1.08 04/21/2019       j  [facility] (19):  Lp(a) normal, acr normal, gluc 171, remainder of CMP wnl, tsh wnl, konstantin/renin wnl, metaneph wnl  Wbc normal, hgb 17.6, normal plats, free cortisol wnl , vit D 12,   LDL-P 1282, small LDL-P956, high risk LDL-P size      Lab Results   Component Value Date    STMTRPT  2019     [facility]    Test Date:  2019  Pt Name:    JEAN CARLOS MIKE             Department: ER  MRN:        [id number]                      Room:  Gender:     Male                         Technician: [id number]  :        1960                   Requested By:ER TRIAGE PROTOCOL  Order #:    [id number]                    Reading MD:    Measurements  Intervals                                Axis  Rate:       100                          P:          41  DE:         167                          QRS:        52  QRSD:       87                           T:          1  QT:         358  QTc:        462    Interpretive Statements  Sinus tachycardia  Compared to ECG 2012 14:40:56  Sinus rhythm no longer present  Prolonged QT interval no longer present           Alonso Dover M.D.

## 2020-01-03 ENCOUNTER — TELEPHONE (OUTPATIENT)
Dept: VASCULAR LAB | Facility: MEDICAL CENTER | Age: 60
End: 2020-01-03

## 2020-01-03 RX ORDER — ERGOCALCIFEROL 1.25 MG/1
50000 CAPSULE ORAL
Qty: 6 CAP | Refills: 0 | Status: SHIPPED
Start: 2020-01-03 | End: 2020-04-02 | Stop reason: CLARIF

## 2020-01-13 ENCOUNTER — HOSPITAL ENCOUNTER (OUTPATIENT)
Dept: CARDIOLOGY | Facility: MEDICAL CENTER | Age: 60
End: 2020-01-13
Attending: FAMILY MEDICINE
Payer: MEDICAID

## 2020-01-13 ENCOUNTER — HOSPITAL ENCOUNTER (OUTPATIENT)
Dept: RADIOLOGY | Facility: MEDICAL CENTER | Age: 60
End: 2020-01-13
Attending: FAMILY MEDICINE
Payer: MEDICAID

## 2020-01-13 DIAGNOSIS — I1A.0 RESISTANT HYPERTENSION: ICD-10-CM

## 2020-01-13 LAB
LV EJECT FRACT  99904: 60
LV EJECT FRACT MOD 2C 99903: 55.79
LV EJECT FRACT MOD 4C 99902: 55.73
LV EJECT FRACT MOD BP 99901: 55.9

## 2020-01-13 PROCEDURE — 93975 VASCULAR STUDY: CPT

## 2020-01-13 PROCEDURE — 93306 TTE W/DOPPLER COMPLETE: CPT

## 2020-01-13 PROCEDURE — 93306 TTE W/DOPPLER COMPLETE: CPT | Mod: 26 | Performed by: INTERNAL MEDICINE

## 2020-03-31 ENCOUNTER — TELEPHONE (OUTPATIENT)
Dept: VASCULAR LAB | Facility: MEDICAL CENTER | Age: 60
End: 2020-03-31

## 2020-04-01 ENCOUNTER — TELEPHONE (OUTPATIENT)
Dept: VASCULAR LAB | Facility: MEDICAL CENTER | Age: 60
End: 2020-04-01

## 2020-04-01 NOTE — TELEPHONE ENCOUNTER
Reached out to [facility]. Pt thought he completed labs in Jan 2020, but they do not have any records.   Left vm for pt to call back to confirm lab location.

## 2020-04-02 ENCOUNTER — TELEPHONE (OUTPATIENT)
Dept: VASCULAR LAB | Facility: MEDICAL CENTER | Age: 60
End: 2020-04-02

## 2020-04-02 ENCOUNTER — OFFICE VISIT (OUTPATIENT)
Dept: VASCULAR LAB | Facility: MEDICAL CENTER | Age: 60
End: 2020-04-02
Attending: FAMILY MEDICINE
Payer: MEDICAID

## 2020-04-02 VITALS
HEIGHT: 71 IN | SYSTOLIC BLOOD PRESSURE: 119 MMHG | DIASTOLIC BLOOD PRESSURE: 81 MMHG | HEART RATE: 96 BPM | WEIGHT: 185.2 LBS | BODY MASS INDEX: 25.93 KG/M2

## 2020-04-02 DIAGNOSIS — E11.3553 STABLE PROLIFERATIVE DIABETIC RETINOPATHY OF BOTH EYES ASSOCIATED WITH TYPE 2 DIABETES MELLITUS (HCC): ICD-10-CM

## 2020-04-02 DIAGNOSIS — E11.65 UNCONTROLLED TYPE 2 DIABETES MELLITUS WITH HYPERGLYCEMIA (HCC): ICD-10-CM

## 2020-04-02 DIAGNOSIS — E55.9 VITAMIN D DEFICIENCY: ICD-10-CM

## 2020-04-02 DIAGNOSIS — I1A.0 RESISTANT HYPERTENSION: ICD-10-CM

## 2020-04-02 DIAGNOSIS — E78.5 DYSLIPIDEMIA: ICD-10-CM

## 2020-04-02 DIAGNOSIS — G47.33 OSA (OBSTRUCTIVE SLEEP APNEA): ICD-10-CM

## 2020-04-02 PROCEDURE — 99214 OFFICE O/P EST MOD 30 MIN: CPT | Performed by: FAMILY MEDICINE

## 2020-04-02 PROCEDURE — 99212 OFFICE O/P EST SF 10 MIN: CPT | Performed by: FAMILY MEDICINE

## 2020-04-02 ASSESSMENT — ENCOUNTER SYMPTOMS
FEVER: 0
NAUSEA: 0
SEIZURES: 0
SHORTNESS OF BREATH: 0
ORTHOPNEA: 0
COUGH: 0
SORE THROAT: 0
VOMITING: 0
INSOMNIA: 0
PALPITATIONS: 0
HEADACHES: 0
TREMORS: 0
BLOOD IN STOOL: 0
WEAKNESS: 0
DIARRHEA: 0
FLANK PAIN: 0
BLURRED VISION: 0
EYE DISCHARGE: 0
FOCAL WEAKNESS: 0
CHILLS: 0
MYALGIAS: 0
BACK PAIN: 0
DOUBLE VISION: 0
NERVOUS/ANXIOUS: 1
BRUISES/BLEEDS EASILY: 0
DEPRESSION: 0
HEMOPTYSIS: 0
DIZZINESS: 1
WHEEZING: 0
ABDOMINAL PAIN: 0

## 2020-04-02 ASSESSMENT — FIBROSIS 4 INDEX: FIB4 SCORE: 1.18

## 2020-04-02 NOTE — PROGRESS NOTES
RESISTANT HTN CLINIC- FOLLOW-UP EVALUATION   4/2/20    Assessment / Plan:     1. Blood Pressure Control:  Qualifies as resistant HTN: yes - 3+ meds w/o control   Office BP Goal ACC/AHA (2017) goal <130/80  Home BP at goal:  yes  Office BP at goal:  yes   24h ABPM (12/2019): Reasonable data acquisition. Mean daytime: 117/85 consistent with suboptimally controlled out of office diastolic blood pressure. Nocturnal dip: Appears somewhat blunted  Echo: ordered, pending  ACR: A1  Device candidate? no   Plan:   Monitoring:   - continue home BP monitoring, reviewed correct technique:  Yes   - monitor lytes/gfr routinely   - advised that stabilizing BP may require multiple med changes and close monitoring over the next several months, reviewed that initially there will be additional imaging and labs and the possibility of adverse drug rxn's and variability of his BP and HR until we have things stabilized.  Advised to contact our office as needed for questions or concerns.      2. Work up of Secondary Causes of Resistant Hypertension:   Renovascular HTN: Excluded  Primary Aldosteronism: Excluded  Thyroid Function: Excluded  Obstructive Sleep Apnea: on CPAP, working on mask fitting for adherence   Pheochromocytoma: Excluded   Cushing's:   Excluded   Other:   1) anxiety - still under counseling at [facility], co-morbid with eye conditions, recent job stress    Recommendations At This Visit: none         3. Medication Use / Adherence:  Assessment: Complete  Recommendations: Instructed to Continue Taking All Medications As Prescribed         4. End Organ Damage:   Left Ventricular Hypertrophy: Absent on  Echocardiogram Date: 1/2020  Albuminuria: None on Date: pending   Renal Function: Chronic Kidney Disease  G2 at worst  Established Cardiovascular Disease: None    5. Lifestyle Recommendations:    Smoking: continued complete avoidance of all tobacco products     Physical Activity: continue healthy activity to improve CV fitness, see care  instructions for additional details     Weight Management and Nutrition: Dietary plan was discussed with patient at this visit including DASH, low sodium and/or as outlined in care instructions     6. Standard HTN Pharmacotherapy:  ACEI/ARB: continue telmisartan to 80mg 1/2 tab BID at pt's request   Thiazide Type Diuretic: continue chlorthalidone 25mg QAM, monitor lytes, previously on HCTZ  Calcium Channel Blocker (CCB): continue amlodipine 5mg QHS    7. Additional Agents:  Aldosterone Antagonist: add as 4th agent   Loop Diuretic: not indicated   Peripheral Alpha Blocker: add as 5th agent  Other CCB: not indicated   Direct Vasodilator: not indicated   Centrally Acting Alpha Agonist: use clonidine 0.1mg for BP spikes >160/>110 only, will try to avoid due to potential for rebound HTN   Other:   BB: not indicated   DRI: not indicated      8. Other CV Risk Factors:     1) LIPID MANAGEMENT:  Guidlelines recommend at least mod-intensity statin  Currently on statin: No   Lp(a) normal   hsCRP 1.8 (avg risk)  LDL-P 1282, small LDL-P 956, high risk LDL-P size (LDL-P/LDL-C ratio discordant)  - focus on LDL-P reduction and apoB due to discordance to LDL-C   Direct LDL 65  NLA Risk Category:   Very high:  T2D with 2 or more RFs or evidence of end-org damage (CKD, ACR>29, retinopathy)  Tx threshold:  non-HDL-C >99, LDL-C >69  Tx goal: non-HDL-C <100,  LDL-C <70  (optional: apoB <80)  At goal: yes   Plan:  - reinforced ongoing TLC measures   - update labs routinely   - low threshold to start at least moderate-intensity statin   - continue vitamin D3 5,000 units indefinitely if statin initiated     2) GLYCEMIA MANAGEMENT:  Diabetic, T2, ophthalmic complications, non-insulin   Last A1c = 7.5 - per pt's report  Goal A1c < 7.5, optimal <7.0  ACR: A1  Plan:  - glyxambi 10/5 daily, titrate to full strength   - defer overall mgmt to [facility] PCP  - increase to metformin 750mg ER daily, increase to 500mg ER 2 tab, then 750mg ER 2 tab   -  recommmend for routine care with PCP (or endocrine) to include regular A1c monitoring, annual albumin/creatinine ratio (ACR), annual diabetic retinopathy screening, foot exams, annual flu vaccine, and updates to pneumonia vaccines as appropriate     3) ANTIPLATELET/ANTICOAGULANT THERAPY: not indicated for now   - not currently indicated for now, hx of bleeding on ASA   - consider plavix     9. Other Issues:    1) DM retinopathy, proliferative  - continue care with ophthalmo for injections routinely     2) BARBARA on CPAP, having trouble with tolerance, overall stable   - defer mgmt to sleep MD      3) vit D deficiency, low   - continue Vit D3 5,000 units daily   - update labs     Studies Ordered At This Visit:  Consider cardiac CT when able to afford   Blood Work to Be Obtained Prior to Next Visit:  As noted   Disposition: 6mo with aprn     Diagnosis:  1. Resistant hypertension  Comp Metabolic Panel   2. Uncontrolled type 2 diabetes mellitus with hyperglycemia (HCC)     3. BARBARA (obstructive sleep apnea)     4. Vitamin D deficiency  VITAMIN D,25 HYDROXY   5. Stable proliferative diabetic retinopathy of both eyes associated with type 2 diabetes mellitus (HCC)     6. Dyslipidemia  Comp Metabolic Panel    VITAMIN D,25 HYDROXY    APOLIPOPROTEIN B    Lipid Profile        History of Present Illness:   Marcello Richardsonblay is male seen for evaluation of resistant HTN and management. Marcello Richardsonblay is referred by: Owen Valles D.O..    No recent labs completed - pt reports completed 1/2020 but  [facility] and [facility] do not have.     HTN  Current HTN concerns: reports new onset of mild, intermittent left upper chest pain.  No radiation.  Gets intermittent numbness at ulnar aspect of left forearm/hand.   Intermittent HA temples.   Current ADRs: changed telmisartan to 1/2 tab BID and less dizz   HTN sx:  Ongoing blurred vision due to underlying DR.  No chest pain, shortness of breath, headache, nausea.  Occasional intermittent  vertigo   Home BP lo-120s/60-70s  24h ABPM completed: not tested  Adherence to current HTN meds: compliant all of the time     Antiplatelet/anticoagulation:   No     Hyperlipidemia:    No prior statin, reports HDL has been good in the past. No prior ASCVD  Current treatment: TLC only  Myalgias? Not applicable  Other adverse drug reactions? Not applicable  Lipid profile: labs done as noted below     T2D:  No current issues.   Currently off Tresiba.   Eye injections Q6wks (Dr. Elizabeth)  Stable. No current symptoms reported.   Tolerating meds and no recent med changes.   Home blood sugars stable, reports no lows.   Last A1c - per pt was 7.5%     Pertinent HTN hx (reviewed at initial visit):   Age at HTN dx:   Past HTN medications: lisino, spirono/hctz  HTN crises:  No prior hospitalization or crises, mostly DM related    Lifestyle factors:     High salt: Yes, Details: regular, trying w/o salt.  no table salt, uses salt in cooking    EtOH: Yes, Details: social only  Interfering substances:     NSAIDs: No    Decongestants. No   Stimulants/drugs: No    Clinical evidence of ASCVD:     1) hx of MI/ACS:  no   2) coronary or other revascularization procedure: no   3) TIA/ischemic CVA: no   4) PAD (including BESSIE <0.9): no   5) documented (CAD, Renal athero, AA due to athero, carotid plaque >50% stenosis: no    Major RFs:     1) Age (Men>44, women>54):  yes   2) Fhx early CAD (<55 men, <65 women):  no   3) cigarette smoking:   reports that he has never smoked. He has never used smokeless tobacco.   4) high BP >139/89 or on tx: yes   5) Low HDL-C (<40 men, <50 women): yes , in past   HDL   Date Value Ref Range Status   2015 28 (L) >39 mg/dL Final     Other ASCVD risk factors:     CKD:  No   Sleeping disorder/BARBARA: Yes, Details: on CPAP   Hypothyroidism: No      Social History:     Social History     Socioeconomic History   • Marital status: Single     Spouse name: Not on file   • Number of children: Not on file    • Years of education: Not on file   • Highest education level: Not on file   Occupational History   • Not on file   Social Needs   • Financial resource strain: Not on file   • Food insecurity     Worry: Not on file     Inability: Not on file   • Transportation needs     Medical: Not on file     Non-medical: Not on file   Tobacco Use   • Smoking status: Never Smoker   • Smokeless tobacco: Never Used   Substance and Sexual Activity   • Alcohol use: Yes     Comment: occ   • Drug use: No   • Sexual activity: Not on file   Lifestyle   • Physical activity     Days per week: Not on file     Minutes per session: Not on file   • Stress: Not on file   Relationships   • Social connections     Talks on phone: Not on file     Gets together: Not on file     Attends Mandaeism service: Not on file     Active member of club or organization: Not on file     Attends meetings of clubs or organizations: Not on file     Relationship status: Not on file   • Intimate partner violence     Fear of current or ex partner: Not on file     Emotionally abused: Not on file     Physically abused: Not on file     Forced sexual activity: Not on file   Other Topics Concern   • Not on file   Social History Narrative   • Not on file        Review of Systems:   Review of Systems   Constitutional: Negative for chills, fever and malaise/fatigue.   HENT: Negative for nosebleeds, sore throat and tinnitus.    Eyes: Negative for blurred vision (baseline ), double vision and discharge.   Respiratory: Negative for cough, hemoptysis, shortness of breath and wheezing.    Cardiovascular: Positive for chest pain (mild, intermittent ). Negative for palpitations, orthopnea and leg swelling.   Gastrointestinal: Negative for abdominal pain, blood in stool, diarrhea, melena, nausea and vomiting.   Genitourinary: Negative for flank pain and hematuria.   Musculoskeletal: Negative for back pain (chronic ), joint pain and myalgias.   Skin: Negative for itching and rash.  "  Neurological: Positive for dizziness. Negative for tremors, focal weakness, seizures, weakness and headaches.   Endo/Heme/Allergies: Does not bruise/bleed easily.   Psychiatric/Behavioral: Negative for depression. The patient is nervous/anxious (basline). The patient does not have insomnia.         Objective:   Allergies:  Hctz [hydrochlorothiazide]    Vitals:    04/02/20 1312 04/02/20 1319 04/02/20 1322   BP: 133/91 128/80 119/81   BP Location: Left arm Left arm Left arm   Patient Position: Sitting Sitting Sitting   BP Cuff Size: Adult Adult Adult   Pulse: 96 97 96   Weight: 84 kg (185 lb 3.2 oz)     Height: 1.803 m (5' 11\")       Body mass index is 25.83 kg/m².    BP:   BP Readings from Last 5 Encounters:   04/02/20 119/81   01/02/20 131/92   11/25/19 155/91   04/21/19 (!) 165/112   09/22/16 (!) 180/99        Outpatient Encounter Medications as of 4/2/2020   Medication Sig Dispense Refill   • Lactobacillus Rhamnosus, GG, (CULTURELLE PO) Take  by mouth.     • Cholecalciferol (VITAMIN D3) 96489 units Tab Take 1 Capsule by mouth every 7 days.     • telmisartan (MICARDIS) 80 MG Tab Take 1 Tab by mouth every bedtime for 360 days. 90 Tab 3   • Empagliflozin-linaGLIPtin (GLYXAMBI) 10-5 MG Tab Take  by mouth.     • Glucose Blood (FREESTYLE LITE TEST VI) to check     • metFORMIN ER (GLUCOPHAGE XR) 500 MG TABLET SR 24 HR Take 1 Tab by mouth every day for 360 days. 90 Tab 3   • chlorthalidone (HYGROTON) 25 MG Tab Take 1 Tab by mouth every day for 360 days. 90 Tab 3   • [DISCONTINUED] vitamin D, Ergocalciferol, (DRISDOL) 82647 units Cap capsule Take 1 Cap by mouth every 7 days. For 6 weeks, then begin Vit D OTC 5000 units daily 6 Cap 0   • aspirin (ASPIRIN 81) 81 MG Chew Tab chewable tablet 1 tablet       No facility-administered encounter medications on file as of 4/2/2020.      Physical Exam  Vitals signs reviewed.   Constitutional:       Appearance: Normal appearance.   HENT:      Head: Normocephalic and atraumatic.      " Nose: Nose normal.      Mouth/Throat:      Mouth: Mucous membranes are moist.      Pharynx: Oropharynx is clear.   Eyes:      Extraocular Movements: Extraocular movements intact.      Conjunctiva/sclera: Conjunctivae normal.   Neck:      Musculoskeletal: Normal range of motion and neck supple.   Cardiovascular:      Rate and Rhythm: Normal rate and regular rhythm.      Pulses: Normal pulses.           Carotid pulses are 2+ on the right side and 2+ on the left side.       Radial pulses are 2+ on the right side and 2+ on the left side.        Dorsalis pedis pulses are 2+ on the right side and 2+ on the left side.        Posterior tibial pulses are 2+ on the right side and 2+ on the left side.      Heart sounds: Normal heart sounds.      Comments:    Spider telangectasia:       RLE:  Scattered     LLE: scattered   Varicosities:           RLE: none      LLE: reticular only    Corona phlebectatica:      RLE:  None        LLE:  None   Cording:         RLE:  None     LLE: None     Pulmonary:      Effort: Pulmonary effort is normal.      Breath sounds: Normal breath sounds.   Abdominal:      General: Abdomen is flat. Bowel sounds are normal.      Palpations: Abdomen is soft.   Musculoskeletal:      Right lower leg: No edema.      Left lower leg: No edema.   Skin:     General: Skin is warm and dry.      Capillary Refill: Capillary refill takes less than 2 seconds.          Neurological:      General: No focal deficit present.      Mental Status: He is alert and oriented to person, place, and time. Mental status is at baseline.   Psychiatric:         Mood and Affect: Mood normal.         Behavior: Behavior normal.          Accessory Clinical Findings:   Echocardiogram:  No results found for this or any previous visit.            Lab Results   Component Value Date    SODIUM 138 04/21/2019    POTASSIUM 4.2 04/21/2019    CHLORIDE 98 04/21/2019    CO2 27 04/21/2019    GLUCOSE 301 (H) 04/21/2019    BUN 22 04/21/2019    CREATININE  1.08 2019      j  [facility] (19):  Lp(a) normal, acr normal, gluc 171, remainder of CMP wnl, tsh wnl, konstantin/renin wnl, metaneph wnl  Wbc normal, hgb 17.6, normal plats, free cortisol wnl , vit D 12,   LDL-P 1282, small LDL-P956, high risk LDL-P size      Lab Results   Component Value Date    STMTRPT  2019     [facility]    Test Date:  2019  Pt Name:    JEAN CARLOS MIKE             Department: ER  MRN:        [id number]                      Room:  Gender:     Male                         Technician: [id number]  :        1960                   Requested By:ER TRIAGE PROTOCOL  Order #:    [id number]                    Reading MD:    Measurements  Intervals                                Axis  Rate:       100                          P:          41  NM:         167                          QRS:        52  QRSD:       87                           T:          1  QT:         358  QTc:        462    Interpretive Statements  Sinus tachycardia  Compared to ECG 2012 14:40:56  Sinus rhythm no longer present  Prolonged QT interval no longer present       MPI   1. No evidence of infarct or ischemia.  2. Left ventricular ejection fraction calculated at 60%.    Echo stress    Normal resting echocardiogram.   Normal treadmill stress echocardiogram.   Hypertension at rest and with exercise    Echo 2020  Normal right and left ventricular size and function.   Left ventricular ejection fraction is visually estimated to be 60%.  Mildly increased septal wall thickness.  No significant valve disease or flow abnormalities.   Normal estimated right atrial pressure.   Unable to estimate pulmonary artery pressure due to an inadequate   tricuspid regurgitant jet.   No prior study is available for comparison.     CRISSY duplex 2020  No sonographic evidence of renal artery stenosis.    Alonso Dover M.D.

## 2020-04-02 NOTE — PATIENT INSTRUCTIONS
- continue all current medications, see updated medication list for changes     BLOOD PRESSURE:  - if you notice BP > 140/>90 for more than 3 days, then contact our office ([facility], [phone]) for further instructions    If you are being treated for blood pressure today: please be advised that stabilizing BP may require multiple med changes and close monitoring over the next several months and initially there may be additional imaging and labs and the possibility of adverse drug reactions. Variability of your blood pressure and heart rate may occur until we have things stabilized.  Please feel free to contact our office as needed for questions or concerns.        SODIUM RESTRICTIONS:   - consume no more than 2,300mg of sodium daily (look at food labels) ,or if you have high BP then reduce to no more than 1,500mg of sodium daily   For more information visit: https://www.HandMinder/contents/low-sodium-diet-the-basics/print?ikehwMxs=9364&source=see_link       DASH DIET:  Overview: (https://www.heart.org/en/health-topics/high-blood-pressure/changes-you-can-make-to-manage-high-blood-pressure/managing-blood-pressure-with-a-heart-healthy-diet)  Tips for shopping:  https://www.[facility].org/healthy-lifestyle/nutrition-and-healthy-eating/in-depth/dash-diet/art-65850862?p=1   DASH is a flexible and balanced eating plan that helps create a heart-healthy eating style for life.  The DASH eating plan requires no special foods and instead provides daily and weekly nutritional goals. This plan recommends:  · Eating vegetables, fruits, and whole grains  · Including fat-free or low-fat dairy products, fish, poultry, beans, nuts, and vegetable oils  · Limiting foods that are high in saturated fat, such as fatty meats, full-fat dairy products, and tropical oils such as coconut, palm kernel, and palm oils  · Limiting sugar-sweetened beverages and sweets.  Based on these recommendations, the following table  shows examples of daily and weekly servings that meet DASH eating plan targets for a 2,196-bhqrwre-v-day diet.    CHOLESTEROL-REDUCING DIETS:  1) AHA diet  (http://www.heart.org/en/healthy-living/healthy-eating/eat-smart/nutrition-basics/aha-diet-and-lifestyle-recommendations)   2) Mediterranean diet (http://www.heart.org/en/healthy-living/healthy-eating/eat-smart/nutrition-basics/mediterranean-diet)   3) Lowering triglycerides: (http://my.americanheart.org/idc/groups/ahamah-public/@wc/@sop/@[facility]/documents/downloadable/[facility]_425988.pdf)     PHYSICAL ACTIVITY:  Unless directed otherwise at today's visit or you are physically incapable due to your current health or medical conditions, it is advised per the American Heart Association to engage in moderate to vigorous physical activity such as brisk walking, swimming, cycling, >150 minutes per week at 30-40 minutes per session, 3 to 5 times weekly.      GENERAL HEALTHY DIET ADVICE:  - the USDA food pattern (https://www.cnpp.usda.gov/USDAFoodPatterns)  - plate method (https://www.choosemyplate.gov/)  - consume diet that emphasizes intake of vegetables, fruits, and whole grains,  - use low fat diary products, poultry, fish, legumes, nontropical vegetable oils, nuts  - limit intake of sweets, sugar-sweetned beverages, and red meats   - reduce saturated and trans fats to <6% of your daily calories

## 2020-05-20 ENCOUNTER — TELEPHONE (OUTPATIENT)
Dept: VASCULAR LAB | Facility: MEDICAL CENTER | Age: 60
End: 2020-05-20

## 2020-05-21 NOTE — TELEPHONE ENCOUNTER
Spoke with the pt regarding his low bp and his dizziness. He is not drinking much fluids and was not eating much. He also had a HA. He will try to drink an little more fluids, maintain his low sodium diet and will continue monitoring his BP. If this does not help he will call back.Also I explained orthostatic hypotension, he will change position slowly.  YULIYA Avalos.

## 2020-06-19 ENCOUNTER — TELEPHONE (OUTPATIENT)
Dept: VASCULAR LAB | Facility: MEDICAL CENTER | Age: 60
End: 2020-06-19

## 2020-06-19 DIAGNOSIS — E11.65 UNCONTROLLED TYPE 2 DIABETES MELLITUS WITH HYPERGLYCEMIA (HCC): ICD-10-CM

## 2020-06-19 NOTE — TELEPHONE ENCOUNTER
D/t recall of metformin 500 mg ER, will rx metformin IR 500mg tabs, 1/2 tab PO BID with meals.      PRADIP Tobar  [facility]

## 2020-09-01 ENCOUNTER — TELEPHONE (OUTPATIENT)
Dept: VASCULAR LAB | Facility: MEDICAL CENTER | Age: 60
End: 2020-09-01

## 2020-09-01 NOTE — TELEPHONE ENCOUNTER
Patient called in wanting to inform Dr. Dover of a new medication that his eye doctor put him on for his Glaucoma, Acetazolamide  mg capsules as well as  Latanoprost Ophthalmia 0.005%. Will send message to Dr. Dover.

## 2020-10-03 ASSESSMENT — ENCOUNTER SYMPTOMS
CHILLS: 0
MYALGIAS: 0
SEIZURES: 0
DOUBLE VISION: 0
WHEEZING: 0
BLURRED VISION: 0
EYE DISCHARGE: 0
INSOMNIA: 0
BLOOD IN STOOL: 0
TREMORS: 0
FLANK PAIN: 0
SORE THROAT: 0
ABDOMINAL PAIN: 0
ORTHOPNEA: 0
BRUISES/BLEEDS EASILY: 0
HEMOPTYSIS: 0
DEPRESSION: 0
VOMITING: 0
DIZZINESS: 1
NAUSEA: 0
NERVOUS/ANXIOUS: 1
FEVER: 0
FOCAL WEAKNESS: 0
DIARRHEA: 0
HEADACHES: 0
COUGH: 0
BACK PAIN: 0
WEAKNESS: 0
SHORTNESS OF BREATH: 0
PALPITATIONS: 0

## 2020-10-03 NOTE — PROGRESS NOTES
RESISTANT HTN CLINIC- FOLLOW-UP EVALUATION   10/5/20    Assessment / Plan:     1. Blood Pressure Control:  Qualifies as resistant HTN: yes - 3+ meds w/o control   Office BP Goal ACC/AHA (2017) goal <130/80  Home BP at goal:  Yes /60-70 could indicate slight over treatment   Office BP at goal:  yes   24h ABPM (12/2019): Reasonable data acquisition. Mean daytime: 117/85 consistent with suboptimally controlled out of office diastolic blood pressure. Nocturnal dip: Appears somewhat blunted  Echo: ordered, pending  ACR: A1  Device candidate? no   Plan:   Monitoring:   - continue home BP monitoring, reviewed correct technique:  Yes   - monitor lytes/gfr routinely   - advised that stabilizing BP may require multiple med changes and close monitoring over the next several months, reviewed that initially there will be additional imaging and labs and the possibility of adverse drug rxn's and variability of his BP and HR until we have things stabilized.  Advised to contact our office as needed for questions or concerns.      2. Work up of Secondary Causes of Resistant Hypertension:   Renovascular HTN: Excluded  Primary Aldosteronism: Excluded  Thyroid Function: Excluded  Obstructive Sleep Apnea: on CPAP, working on mask fitting for adherence   Pheochromocytoma: Excluded   Cushing's:   Excluded   Other:   1) anxiety - still under counseling at [facility], co-morbid with eye conditions, recent job stress    Recommendations At This Visit: none         3. Medication Use / Adherence:  Assessment: Complete  Recommendations: Instructed to Continue Taking All Medications As Prescribed         4. End Organ Damage:   Left Ventricular Hypertrophy: Absent on  Echocardiogram Date: 1/2020  Albuminuria: None on Date: pending   Renal Function: Chronic Kidney Disease  G2 at worst  Established Cardiovascular Disease: None    5. Lifestyle Recommendations:    Smoking: continued complete avoidance of all tobacco products     Physical Activity:  continue healthy activity to improve CV fitness, see care instructions for additional details     Weight Management and Nutrition: Dietary plan was discussed with patient at this visit including DASH, low sodium and/or as outlined in care instructions     6. Standard HTN Pharmacotherapy:  ACEI/ARB: decrease telmisartan to 80mg 1/2 tab once a day for now and monitor BP, may need to prescribe 20 mg is the 40 daily is still to much.  Thiazide Type Diuretic: continue chlorthalidone 25mg QAM, monitor lytes, previously on HCTZ  Calcium Channel Blocker (CCB): continue amlodipine 5mg QHS    7. Additional Agents:  Aldosterone Antagonist: add as 4th agent   Loop Diuretic: not indicated   Peripheral Alpha Blocker: add as 5th agent  Other CCB: not indicated   Direct Vasodilator: not indicated   Centrally Acting Alpha Agonist: use clonidine 0.1mg for BP spikes >160/>110 only, will try to avoid due to potential for rebound HTN   Other:   BB: not indicated   DRI: not indicated      8. Other CV Risk Factors:     1) LIPID MANAGEMENT:  Guidlelines recommend at least mod-intensity statin  Currently on statin: No   Lp(a) normal   hsCRP 1.8 (avg risk)  LDL-P 1282, small LDL-P 956, high risk LDL-P size (LDL-P/LDL-C ratio discordant)  - focus on LDL-P reduction and apoB due to discordance to LDL-C   Direct LDL 65  NLA Risk Category:   Very high:  T2D with 2 or more RFs or evidence of end-org damage (CKD, ACR>29, retinopathy)  Tx threshold:  non-HDL-C >99, LDL-C >69  Tx goal: non-HDL-C <100,  LDL-C <70  (optional: apoB <80)  At goal: yes   Plan:  - reinforced ongoing TLC measures   - update labs routinely   - low threshold to start at least moderate-intensity statin   - continue vitamin D3 5,000 units indefinitely if statin initiated     2) GLYCEMIA MANAGEMENT:  Diabetic, T2, ophthalmic complications, non-insulin   Last A1c = 7.5 - per pt's report  Goal A1c < 7.5, optimal <7.0  ACR: A1  Plan:  - glyxambi 25/5 daily,   - defer overall mgmt  to YULISA PCP  - increase to metformin 750mg ER daily, increase to 500mg ER 2 tab, then 750mg ER 2 tab   - recommmend for routine care with PCP (or endocrine) to include regular A1c monitoring, annual albumin/creatinine ratio (ACR), annual diabetic retinopathy screening, foot exams, annual flu vaccine, and updates to pneumonia vaccines as appropriate     3) ANTIPLATELET/ANTICOAGULANT THERAPY: not indicated for now   - not currently indicated for now, hx of bleeding on ASA   - consider plavix     9. Other Issues:    1) DM retinopathy, proliferative and glaucoma that may require surgery      - continue care with ophthalmo for injections routinely and second opinion for surgery      2) BARBARA on CPAP, having trouble with tolerance, overall stable   - defer mgmt to sleep MD      3) vit D deficiency, low   - continue Vit D3 5,000 units daily   - update labs     Studies Ordered At This Visit:  Consider cardiac CT when able to afford   Blood Work to Be Obtained Prior to Next Visit:  As noted   Disposition: 2mo with aprn     Diagnosis:  No diagnosis found.     History of Present Illness:   Marcello Perez is male seen for evaluation of resistant HTN and management. Marcello Perez is referred by: Owen Valles D.O..    No recent labs completed - pt reports completed 2020 but  Kay and YULISA do not have.     HTN  Current HTN concerns: reports new onset of mild, intermittent left upper chest pain.  No radiation.  Gets intermittent numbness at ulnar aspect of left forearm/hand.   Intermittent HA temples.   Current ADRs: changed telmisartan to 1/2 tab BID and less dizz   HTN sx:  Ongoing blurred vision due to underlying DR.  No chest pain, shortness of breath, headache, nausea.  Occasional intermittent vertigo, Night time dizziness taking telmisartan 1/2 tab bid    Home BP lo-110s/60-70s  24h ABPM completed: not tested  Adherence to current HTN meds: compliant all of the time     Antiplatelet/anticoagulation:    No     Hyperlipidemia:    No prior statin, reports HDL has been good in the past. No prior ASCVD  Current treatment: TLC only  Myalgias? Not applicable  Other adverse drug reactions? Not applicable  Lipid profile: labs done as noted below     T2D:  No current issues.   Currently off Tresiba.   Eye injections Q6wks (Dr. Elizabeth)  Stable. No current symptoms reported.   Tolerating meds and no recent med changes.   Home blood sugars stable, reports no lows.   Last A1c - per pt was 7.5%     Pertinent HTN hx (reviewed at initial visit):   Age at HTN dx: 2000  Past HTN medications: lisino, spirono/hctz  HTN crises:  No prior hospitalization or crises, mostly DM related    Lifestyle factors:     High salt: Yes, Details: regular, trying w/o salt.  no table salt, uses salt in cooking    EtOH: Yes, Details: social only  Interfering substances:     NSAIDs: No    Decongestants. No   Stimulants/drugs: No    Clinical evidence of ASCVD:     1) hx of MI/ACS:  no   2) coronary or other revascularization procedure: no   3) TIA/ischemic CVA: no   4) PAD (including BESSIE <0.9): no   5) documented (CAD, Renal athero, AA due to athero, carotid plaque >50% stenosis: no    Major RFs:     1) Age (Men>44, women>54):  yes   2) Fhx early CAD (<55 men, <65 women):  no   3) cigarette smoking:   reports that he has never smoked. He has never used smokeless tobacco.   4) high BP >139/89 or on tx: yes   5) Low HDL-C (<40 men, <50 women): yes , in past   HDL   Date Value Ref Range Status   04/22/2015 28 (L) >39 mg/dL Final     Other ASCVD risk factors:     CKD:  No   Sleeping disorder/BARBARA: Yes, Details: on CPAP   Hypothyroidism: No      Social History:     Social History     Socioeconomic History   • Marital status: Single     Spouse name: Not on file   • Number of children: Not on file   • Years of education: Not on file   • Highest education level: Not on file   Occupational History   • Not on file   Social Needs   • Financial resource strain: Not  on file   • Food insecurity     Worry: Not on file     Inability: Not on file   • Transportation needs     Medical: Not on file     Non-medical: Not on file   Tobacco Use   • Smoking status: Never Smoker   • Smokeless tobacco: Never Used   Substance and Sexual Activity   • Alcohol use: Yes     Comment: occ   • Drug use: No   • Sexual activity: Not on file   Lifestyle   • Physical activity     Days per week: Not on file     Minutes per session: Not on file   • Stress: Not on file   Relationships   • Social connections     Talks on phone: Not on file     Gets together: Not on file     Attends [employer] service: Not on file     Active member of club or organization: Not on file     Attends meetings of clubs or organizations: Not on file     Relationship status: Not on file   • Intimate partner violence     Fear of current or ex partner: Not on file     Emotionally abused: Not on file     Physically abused: Not on file     Forced sexual activity: Not on file   Other Topics Concern   • Not on file   Social History Narrative   • Not on file        Review of Systems:   Review of Systems   Constitutional: Negative for chills, fever and malaise/fatigue.   HENT: Negative for nosebleeds, sore throat and tinnitus.    Eyes: Negative for blurred vision (baseline ), double vision and discharge.   Respiratory: Negative for cough, hemoptysis, shortness of breath and wheezing.    Cardiovascular: Positive for chest pain (mild, intermittent ). Negative for palpitations, orthopnea and leg swelling.   Gastrointestinal: Negative for abdominal pain, blood in stool, diarrhea, melena, nausea and vomiting.   Genitourinary: Negative for flank pain and hematuria.   Musculoskeletal: Negative for back pain (chronic ), joint pain and myalgias.   Skin: Negative for itching and rash.   Neurological: Positive for dizziness. Negative for tremors, focal weakness, seizures, weakness and headaches.   Endo/Heme/Allergies: Does not bruise/bleed easily.    Psychiatric/Behavioral: Negative for depression. The patient is nervous/anxious (basline). The patient does not have insomnia.         Objective:   Allergies:  Hctz [hydrochlorothiazide]    There were no vitals filed for this visit.  There is no height or weight on file to calculate BMI.    BP:   BP Readings from Last 5 Encounters:   04/02/20 119/81   01/02/20 131/92   11/25/19 155/91   04/21/19 (!) 165/112   09/22/16 (!) 180/99        Outpatient Encounter Medications as of 10/5/2020   Medication Sig Dispense Refill   • metFORMIN (GLUCOPHAGE) 500 MG Tab Take 0.5 Tabs by mouth 2 times a day, with meals. 30 Tab 6   • Lactobacillus Rhamnosus, GG, (CULTURELLE PO) Take  by mouth.     • Cholecalciferol (VITAMIN D3) 92710 units Tab Take 1 Capsule by mouth every 7 days.     • telmisartan (MICARDIS) 80 MG Tab Take 1 Tab by mouth every bedtime for 360 days. 90 Tab 3   • Empagliflozin-linaGLIPtin (GLYXAMBI) 10-5 MG Tab Take  by mouth.     • aspirin (ASPIRIN 81) 81 MG Chew Tab chewable tablet 1 tablet     • Glucose Blood (FREESTYLE LITE TEST VI) to check     • chlorthalidone (HYGROTON) 25 MG Tab Take 1 Tab by mouth every day for 360 days. 90 Tab 3     No facility-administered encounter medications on file as of 10/5/2020.      Physical Exam  Vitals signs reviewed.   Constitutional:       Appearance: Normal appearance.   HENT:      Head: Normocephalic and atraumatic.      Nose: Nose normal.      Mouth/Throat:      Mouth: Mucous membranes are moist.      Pharynx: Oropharynx is clear.   Eyes:      Extraocular Movements: Extraocular movements intact.      Conjunctiva/sclera: Conjunctivae normal.   Neck:      Musculoskeletal: Normal range of motion and neck supple.   Cardiovascular:      Rate and Rhythm: Normal rate and regular rhythm.      Pulses: Normal pulses.           Carotid pulses are 2+ on the right side and 2+ on the left side.       Radial pulses are 2+ on the right side and 2+ on the left side.        Dorsalis pedis  pulses are 2+ on the right side and 2+ on the left side.        Posterior tibial pulses are 2+ on the right side and 2+ on the left side.      Heart sounds: Normal heart sounds.      Comments:    Spider telangectasia:       RLE:  Scattered     LLE: scattered   Varicosities:           RLE: none      LLE: reticular only    Corona phlebectatica:      RLE:  None        LLE:  None   Cording:         RLE:  None     LLE: None     Pulmonary:      Effort: Pulmonary effort is normal.      Breath sounds: Normal breath sounds.   Abdominal:      General: Abdomen is flat. Bowel sounds are normal.      Palpations: Abdomen is soft.   Musculoskeletal:      Right lower leg: No edema.      Left lower leg: No edema.   Skin:     General: Skin is warm and dry.      Capillary Refill: Capillary refill takes less than 2 seconds.          Neurological:      General: No focal deficit present.      Mental Status: He is alert and oriented to person, place, and time. Mental status is at baseline.   Psychiatric:         Mood and Affect: Mood normal.         Behavior: Behavior normal.          Accessory Clinical Findings:   Echocardiogram:  No results found for this or any previous visit.            Lab Results   Component Value Date    SODIUM 138 04/21/2019    POTASSIUM 4.2 04/21/2019    CHLORIDE 98 04/21/2019    CO2 27 04/21/2019    GLUCOSE 301 (H) 04/21/2019    BUN 22 04/21/2019    CREATININE 1.08 04/21/2019      j  [facility] (11/27/19):  Lp(a) normal, acr normal, gluc 171, remainder of CMP wnl, tsh wnl, konstantin/renin wnl, metaneph wnl  Wbc normal, hgb 17.6, normal plats, free cortisol wnl , vit D 12,   LDL-P 1282, small LDL-P956, high risk LDL-P size      Lab Results   Component Value Date    STMTRPT  04/21/2019     [facility]    Test Date:  2019-04-21  Pt Name:    JEAN CARLOS MIKE             Department: ER  MRN:        [id number]                      Room:  Gender:     Male                         Technician:  [id number]  :        1960                   Requested By:ER TRIAGE PROTOCOL  Order #:    [id number]                    Reading MD:    Measurements  Intervals                                Axis  Rate:       100                          P:          41  NV:         167                          QRS:        52  QRSD:       87                           T:          1  QT:         358  QTc:        462    Interpretive Statements  Sinus tachycardia  Compared to ECG 2012 14:40:56  Sinus rhythm no longer present  Prolonged QT interval no longer present       MPI   1. No evidence of infarct or ischemia.  2. Left ventricular ejection fraction calculated at 60%.    Echo stress    Normal resting echocardiogram.   Normal treadmill stress echocardiogram.   Hypertension at rest and with exercise    Echo 2020  Normal right and left ventricular size and function.   Left ventricular ejection fraction is visually estimated to be 60%.  Mildly increased septal wall thickness.  No significant valve disease or flow abnormalities.   Normal estimated right atrial pressure.   Unable to estimate pulmonary artery pressure due to an inadequate   tricuspid regurgitant jet.   No prior study is available for comparison.     CRISSY duplex 2020  No sonographic evidence of renal artery stenosis.    YULIYA Avalos.

## 2020-10-05 ENCOUNTER — OFFICE VISIT (OUTPATIENT)
Dept: VASCULAR LAB | Facility: MEDICAL CENTER | Age: 60
End: 2020-10-05
Attending: NURSE PRACTITIONER
Payer: MEDICAID

## 2020-10-05 VITALS
WEIGHT: 183 LBS | HEART RATE: 87 BPM | SYSTOLIC BLOOD PRESSURE: 111 MMHG | DIASTOLIC BLOOD PRESSURE: 72 MMHG | HEIGHT: 71 IN | BODY MASS INDEX: 25.62 KG/M2

## 2020-10-05 DIAGNOSIS — G47.33 OSA (OBSTRUCTIVE SLEEP APNEA): ICD-10-CM

## 2020-10-05 DIAGNOSIS — E11.65 UNCONTROLLED TYPE 2 DIABETES MELLITUS WITH HYPERGLYCEMIA (HCC): ICD-10-CM

## 2020-10-05 DIAGNOSIS — I1A.0 RESISTANT HYPERTENSION: ICD-10-CM

## 2020-10-05 PROCEDURE — 99212 OFFICE O/P EST SF 10 MIN: CPT | Performed by: NURSE PRACTITIONER

## 2020-10-05 PROCEDURE — 99214 OFFICE O/P EST MOD 30 MIN: CPT | Performed by: NURSE PRACTITIONER

## 2020-10-05 RX ORDER — LATANOPROST 50 UG/ML
1 SOLUTION/ DROPS OPHTHALMIC NIGHTLY
COMMUNITY
End: 2023-08-23

## 2020-10-05 RX ORDER — DORZOLAMIDE HYDROCHLORIDE AND TIMOLOL MALEATE 20; 5 MG/ML; MG/ML
1 SOLUTION/ DROPS OPHTHALMIC 2 TIMES DAILY
COMMUNITY
End: 2021-03-29

## 2020-10-05 RX ORDER — ACETAZOLAMIDE 500 MG/1
500 CAPSULE, EXTENDED RELEASE ORAL 2 TIMES DAILY
COMMUNITY
End: 2020-12-07

## 2020-10-05 RX ORDER — EMPAGLIFLOZIN AND LINAGLIPTIN 25; 5 MG/1; MG/1
1 TABLET, FILM COATED ORAL DAILY
COMMUNITY
End: 2023-09-29

## 2020-10-05 ASSESSMENT — FIBROSIS 4 INDEX: FIB4 SCORE: 1.2

## 2020-12-03 ENCOUNTER — TELEPHONE (OUTPATIENT)
Dept: VASCULAR LAB | Facility: MEDICAL CENTER | Age: 60
End: 2020-12-03

## 2020-12-03 ASSESSMENT — ENCOUNTER SYMPTOMS
BLURRED VISION: 0
INSOMNIA: 0
EYE DISCHARGE: 0
WEAKNESS: 0
NAUSEA: 0
NERVOUS/ANXIOUS: 1
DEPRESSION: 0
FLANK PAIN: 0
CHILLS: 0
MYALGIAS: 0
BLOOD IN STOOL: 0
WHEEZING: 0
PALPITATIONS: 0
TREMORS: 0
FOCAL WEAKNESS: 0
FEVER: 0
DIZZINESS: 1
SORE THROAT: 0
ABDOMINAL PAIN: 0
COUGH: 0
SHORTNESS OF BREATH: 0
HEMOPTYSIS: 0
BACK PAIN: 0
SEIZURES: 0
DOUBLE VISION: 0
ORTHOPNEA: 0
BRUISES/BLEEDS EASILY: 0
HEADACHES: 0
VOMITING: 0
DIARRHEA: 0

## 2020-12-03 NOTE — PROGRESS NOTES
RESISTANT HTN CLINIC- FOLLOW-UP EVALUATION   10/5/20    Assessment / Plan:     This visit was conducted via [employer] video call using secure and encrypted video conferencing technology due to covid-19 restrictions.   The patient was in a private location in the [facility].    The patient's identity was confirmed and verbal consent was obtained for this virtual visit.    1. Blood Pressure Control:  Qualifies as resistant HTN: yes - 3+ meds w/o control   Office BP Goal ACC/AHA (2017) goal <130/80  Home BP at goal:  Yes  could indicate slight over treatment   Office BP at goal:  yes   24h ABPM (12/2019): Reasonable data acquisition. Mean daytime: 117/85 consistent with suboptimally controlled out of office diastolic blood pressure. Nocturnal dip: Appears somewhat blunted  Echo: ordered, pending  ACR: A1  Device candidate? no   Plan:   Monitoring:   - continue home BP monitoring, reviewed correct technique:  Yes   - monitor lytes/gfr routinely   - advised that stabilizing BP may require multiple med changes and close monitoring over the next several months, reviewed that initially there will be additional imaging and labs and the possibility of adverse drug rxn's and variability of his BP and HR until we have things stabilized.  Advised to contact our office as needed for questions or concerns.      2. Work up of Secondary Causes of Resistant Hypertension:   Renovascular HTN: Excluded  Primary Aldosteronism: Excluded  Thyroid Function: Excluded  Obstructive Sleep Apnea: on CPAP, working on mask fitting for adherence   Pheochromocytoma: Excluded   Cushing's:   Excluded   Other:   1) anxiety - still under counseling at [facility], co-morbid with eye conditions, recent job stress    Recommendations At This Visit: none         3. Medication Use / Adherence:  Assessment: Complete  Recommendations: Instructed to Continue Taking All Medications As Prescribed         4. End Organ Damage:   Left Ventricular Hypertrophy: Absent on   Echocardiogram Date: 1/2020  Albuminuria: None on Date: pending   Renal Function: Chronic Kidney Disease  G2 at worst  Established Cardiovascular Disease: None    5. Lifestyle Recommendations:    Smoking: continued complete avoidance of all tobacco products     Physical Activity: continue healthy activity to improve CV fitness, see care instructions for additional details     Weight Management and Nutrition: Dietary plan was discussed with patient at this visit including DASH, low sodium and/or as outlined in care instructions     6. Standard HTN Pharmacotherapy:  ACEI/ARB: decrease telmisartan to 80mg 1/2 tab once a day for now and monitor BP, may need to prescribe 20 mg is the 40 daily is still to much. Switch to pm dose.   Thiazide Type Diuretic: Continue Chlorthalidone 25 mg    Calcium Channel Blocker (CCB):Taken off     7. Additional Agents:  Aldosterone Antagonist: add as 4th agent   Loop Diuretic: not indicated   Peripheral Alpha Blocker: add as 5th agent  Other CCB: not indicated   Direct Vasodilator: not indicated   Centrally Acting Alpha Agonist: Not using clonidine 0.1mg for BP spikes >160/>110 only,  Other: Diuretic--Off for now  Beta blocker : not indicated   DRI: not indicated      8. Other CV Risk Factors:     1) LIPID MANAGEMENT:  Guidlelines recommend at least mod-intensity statin  Currently on statin: No   Lp(a) normal   hsCRP 1.8 (avg risk)  LDL-P 1282, small LDL-P 956, high risk LDL-P size (LDL-P/LDL-C ratio discordant)  - focus on LDL-P reduction and apoB due to discordance to LDL-C   Direct LDL 65  NLA Risk Category:   Very high:  T2D with 2 or more RFs or evidence of end-org damage (CKD, ACR>29, retinopathy)  Tx threshold:  non-HDL-C >99, LDL-C >69  Tx goal: non-HDL-C <100,  LDL-C <70  (optional: apoB <80)  At goal: yes   Plan:  - reinforced ongoing TLC measures   - update labs routinely   - low threshold to start at least moderate-intensity statin   - continue vitamin D3 5,000 units  indefinitely if statin initiated     2) GLYCEMIA MANAGEMENT:  Diabetic, T2, ophthalmic complications, non-insulin   Last A1c = 7.5 - per pt's report  Goal A1c < 7.5, optimal <7.0  ACR: A1  Plan:  - glyxambi 25/5 daily,   - defer overall mgmt to [facility] PCP  -metformin changed due to recall to 500 mg 4 times a day with each meal and at bedtime  - recommmend for routine care with PCP (or endocrine) to include regular A1c monitoring, annual albumin/creatinine ratio (ACR), annual diabetic retinopathy screening, foot exams, annual flu vaccine, and updates to pneumonia vaccines as appropriate     3) ANTIPLATELET/ANTICOAGULANT THERAPY: not indicated for now   - not currently indicated for now, hx of bleeding on ASA   - consider plavix     9. Other Issues:    1) DM retinopathy, proliferative and glaucoma that may require surgery      - continue care with ophthalmo for injections routinely and second opinion for surgery      2) BARBARA on CPAP, having trouble with tolerance, overall stable   - defer mgmt to sleep MD      3) vit D deficiency, low   - continue Vit D3 5,000 units daily   - update labs     Studies Ordered At This Visit:  Consider cardiac CT when able to afford   Blood Work to Be Obtained Prior to Next Visit:  As noted   Disposition: 4 mo with aprn will call back to check on changes in 1 week on my calendar.     Diagnosis:  1. Resistant hypertension  Comp Metabolic Panel    MICROALBUMIN CREAT RATIO URINE   2. Uncontrolled type 2 diabetes mellitus with hyperglycemia (HCC)  Comp Metabolic Panel    HEMOGLOBIN A1C (Glycohemoglobin GHB Total/A1C with MBG Estimate)    MICROALBUMIN CREAT RATIO URINE    metFORMIN (GLUCOPHAGE) 500 MG Tab    DISCONTINUED: metFORMIN (GLUCOPHAGE) 500 MG Tab   3. Dyslipidemia  LIPID PANEL        History of Present Illness:   Marcello Huang Ana is male seen for evaluation of resistant HTN and management. Marcello Huang Ana is referred by: Owen Valles D.O..    No recent labs completed - pt  reports completed 2020 but  [facility] and [facility] Campbell do not have.     HTN  Current HTN concerns: reports new onset of mild, intermittent left upper chest pain.  No radiation.  Gets intermittent numbness at ulnar aspect of left forearm/hand.   Intermittent HA temples.   Current ADRs: changed telmisartan to 1/2 tab BID and less dizz   HTN sx:  Ongoing blurred vision due to underlying DR.  No chest pain, shortness of breath, headache, nausea.  Occasional intermittent vertigo, Night time dizziness taking telmisartan 1/2 tab bid    Home BP lo-124/70-81  24h ABPM completed: not tested  Adherence to current HTN meds: compliant all of the time     Antiplatelet/anticoagulation:   No     Hyperlipidemia:    No prior statin, reports HDL has been good in the past. No prior ASCVD  Current treatment: TLC only  Myalgias? Not applicable  Other adverse drug reactions? Not applicable  Lipid profile: labs done as noted below     T2D:  No current issues.   Currently off Tresiba.   Continue care with Dr. Glenn Meek. No current symptoms reported.   Tolerating meds and no recent med changes.   Home blood sugars stable, reports no lows.   Last A1c - per pt was 7.5%     Pertinent HTN hx (reviewed at initial visit):   Age at HTN dx:   Past HTN medications: lisino, spirono/hctz  HTN crises:  No prior hospitalization or crises, mostly DM related    Lifestyle factors:     High salt: Yes, Details: regular, trying w/o salt.  no table salt, uses salt in cooking    EtOH: Yes, Details: social only  Interfering substances:     NSAIDs: No    Decongestants. No   Stimulants/drugs: No    Clinical evidence of ASCVD:     1) hx of MI/ACS:  no   2) coronary or other revascularization procedure: no   3) TIA/ischemic CVA: no   4) PAD (including BESSIE <0.9): no   5) documented (CAD, Renal athero, AA due to athero, carotid plaque >50% stenosis: no    Major RFs:     1) Age (Men>44, women>54):  yes   2) Fhx early CAD (<55 men, <65 women):  no   3) cigarette  smoking:   reports that he has never smoked. He has never used smokeless tobacco.   4) high BP >139/89 or on tx: yes   5) Low HDL-C (<40 men, <50 women): yes , in past   HDL   Date Value Ref Range Status   04/22/2015 28 (L) >39 mg/dL Final     Other ASCVD risk factors:     CKD:  No   Sleeping disorder/BARBARA: Yes, Details: on CPAP   Hypothyroidism: No      Social History:     Social History     Socioeconomic History   • Marital status: Single     Spouse name: Not on file   • Number of children: Not on file   • Years of education: Not on file   • Highest education level: Not on file   Occupational History   • Not on file   Social Needs   • Financial resource strain: Not on file   • Food insecurity     Worry: Not on file     Inability: Not on file   • Transportation needs     Medical: Not on file     Non-medical: Not on file   Tobacco Use   • Smoking status: Never Smoker   • Smokeless tobacco: Never Used   Substance and Sexual Activity   • Alcohol use: Yes     Comment: occ   • Drug use: No   • Sexual activity: Not on file   Lifestyle   • Physical activity     Days per week: Not on file     Minutes per session: Not on file   • Stress: Not on file   Relationships   • Social connections     Talks on phone: Not on file     Gets together: Not on file     Attends Islam service: Not on file     Active member of club or organization: Not on file     Attends meetings of clubs or organizations: Not on file     Relationship status: Not on file   • Intimate partner violence     Fear of current or ex partner: Not on file     Emotionally abused: Not on file     Physically abused: Not on file     Forced sexual activity: Not on file   Other Topics Concern   • Not on file   Social History Narrative   • Not on file        Review of Systems:   Review of Systems   Constitutional: Negative for chills, fever and malaise/fatigue.   HENT: Negative for nosebleeds, sore throat and tinnitus.    Eyes: Negative for blurred vision (baseline ),  double vision and discharge.   Respiratory: Negative for cough, hemoptysis, shortness of breath and wheezing.    Cardiovascular: Positive for chest pain (mild, intermittent ). Negative for palpitations, orthopnea and leg swelling.   Gastrointestinal: Negative for abdominal pain, blood in stool, diarrhea, melena, nausea and vomiting.   Genitourinary: Negative for flank pain and hematuria.   Musculoskeletal: Negative for back pain (chronic ), joint pain and myalgias.   Skin: Negative for itching and rash.   Neurological: Positive for dizziness. Negative for tremors, focal weakness, seizures, weakness and headaches.   Endo/Heme/Allergies: Does not bruise/bleed easily.   Psychiatric/Behavioral: Negative for depression. The patient is nervous/anxious (basline). The patient does not have insomnia.         Objective:   Allergies:  Hctz [hydrochlorothiazide]    There were no vitals filed for this visit.  There is no height or weight on file to calculate BMI.    BP:   BP Readings from Last 5 Encounters:   10/05/20 111/72   04/02/20 119/81   01/02/20 131/92   11/25/19 155/91   04/21/19 (!) 165/112        Outpatient Encounter Medications as of 12/7/2020   Medication Sig Dispense Refill   • chlorthalidone (HYGROTON) 25 MG Tab Take 25 mg by mouth every day.     • metFORMIN (GLUCOPHAGE) 500 MG Tab Take 1 Tab by mouth 4 times a day. 120 Tab 5   • [DISCONTINUED] metFORMIN (GLUCOPHAGE) 500 MG Tab Take 0.5 Tabs by mouth 4 times a day. 120 Tab 5   • latanoprost (XALATAN) 0.005 % Solution Place 1 Drop in both eyes every evening.     • dorzolamide-timolol (COSOPT) 22.3-6.8 MG/ML Solution Place 1 Drop in both eyes 2 times a day.     • Empagliflozin-linaGLIPtin (GLYXAMBI) 25-5 MG Tab Take  by mouth.     • [DISCONTINUED] acetaZOLAMIDE SR (DIAMOX) 500 MG CAPSULE SR 12 HR Take 500 mg by mouth 2 times a day.     • [DISCONTINUED] metFORMIN (GLUCOPHAGE) 500 MG Tab Take 0.5 Tabs by mouth 2 times a day, with meals. 30 Tab 6   • Lactobacillus  Rhamnosus, GG, (CULTURELLE PO) Take  by mouth.     • Cholecalciferol (VITAMIN D3) 99042 units Tab Take 1 Capsule by mouth every 7 days.     • telmisartan (MICARDIS) 80 MG Tab Take 1 Tab by mouth every bedtime for 360 days. 90 Tab 3   • aspirin (ASPIRIN 81) 81 MG Chew Tab chewable tablet 1 tablet     • Glucose Blood (FREESTYLE LITE TEST VI) to check     • [DISCONTINUED] Empagliflozin-linaGLIPtin (GLYXAMBI) 10-5 MG Tab Take  by mouth.       No facility-administered encounter medications on file as of 2020.      Physical Exam  Constitutional:       Appearance: Normal appearance.   Cardiovascular:      Comments:      Skin:     Coloration: Skin is not pale.          Neurological:      Mental Status: He is alert.   Psychiatric:         Mood and Affect: Mood normal.         Behavior: Behavior normal.         Thought Content: Thought content normal.          Accessory Clinical Findings:   Echocardiogram:  No results found for this or any previous visit.            Lab Results   Component Value Date    SODIUM 138 2019    POTASSIUM 4.2 2019    CHLORIDE 98 2019    CO2 27 2019    GLUCOSE 301 (H) 2019    BUN 22 2019    CREATININE 1.08 2019      j  [facility] (19):  Lp(a) normal, acr normal, gluc 171, remainder of CMP wnl, tsh wnl, konstantin/renin wnl, metaneph wnl  Wbc normal, hgb 17.6, normal plats, free cortisol wnl , vit D 12,   LDL-P 1282, small LDL-P956, high risk LDL-P size      Lab Results   Component Value Date    STMTRPT  2019     [facility]    Test Date:  2019  Pt Name:    JEAN CARLOS MIKE             Department: ER  MRN:        [id number]                      Room:  Gender:     Male                         Technician: [id number]  :        1960                   Requested By:ER TRIAGE PROTOCOL  Order #:    [id number]                    Reading MD:    Measurements  Intervals                                Axis  Rate:       100                           P:          41  ND:         167                          QRS:        52  QRSD:       87                           T:          1  QT:         358  QTc:        462    Interpretive Statements  Sinus tachycardia  Compared to ECG 05/18/2012 14:40:56  Sinus rhythm no longer present  Prolonged QT interval no longer present       MPI 2011  1. No evidence of infarct or ischemia.  2. Left ventricular ejection fraction calculated at 60%.    Echo stress 2012   Normal resting echocardiogram.   Normal treadmill stress echocardiogram.   Hypertension at rest and with exercise    Echo 1/2020  Normal right and left ventricular size and function.   Left ventricular ejection fraction is visually estimated to be 60%.  Mildly increased septal wall thickness.  No significant valve disease or flow abnormalities.   Normal estimated right atrial pressure.   Unable to estimate pulmonary artery pressure due to an inadequate   tricuspid regurgitant jet.   No prior study is available for comparison.     CRISSY duplex 1/2020  No sonographic evidence of renal artery stenosis.    YULIYA Avalos.

## 2020-12-07 ENCOUNTER — OFFICE VISIT (OUTPATIENT)
Dept: VASCULAR LAB | Facility: MEDICAL CENTER | Age: 60
End: 2020-12-07
Payer: MEDICAID

## 2020-12-07 DIAGNOSIS — E11.65 UNCONTROLLED TYPE 2 DIABETES MELLITUS WITH HYPERGLYCEMIA (HCC): ICD-10-CM

## 2020-12-07 DIAGNOSIS — E78.5 DYSLIPIDEMIA: ICD-10-CM

## 2020-12-07 DIAGNOSIS — I1A.0 RESISTANT HYPERTENSION: ICD-10-CM

## 2020-12-07 PROCEDURE — 99213 OFFICE O/P EST LOW 20 MIN: CPT | Mod: CR | Performed by: NURSE PRACTITIONER

## 2020-12-07 RX ORDER — CHLORTHALIDONE 25 MG/1
25 TABLET ORAL DAILY
COMMUNITY
End: 2021-04-14

## 2020-12-17 ENCOUNTER — TELEPHONE (OUTPATIENT)
Dept: VASCULAR LAB | Facility: MEDICAL CENTER | Age: 60
End: 2020-12-17

## 2020-12-17 NOTE — TELEPHONE ENCOUNTER
Pt called in with his BP's . His BP's are better at 101-130/70-80. No dizziness. He is taking the Telmisartan in the pm and chlorthalidone in the am. He also was saying that his stomach is upset from taking the metformin 500 mg  4 times a day. He will change it to just 2 tabs PM and will check his BS frequently if it goes up he will then take it 2 am and 2 pm. A1c is ordered.He thinks that his BS's are higher on the metformin, but I am not convinced it is the Metformin. More likely it is his diet causing the BS elevation.  YULIYA Avalos.

## 2021-01-18 ENCOUNTER — TELEPHONE (OUTPATIENT)
Dept: VASCULAR LAB | Facility: MEDICAL CENTER | Age: 61
End: 2021-01-18

## 2021-01-18 DIAGNOSIS — E11.65 UNCONTROLLED TYPE 2 DIABETES MELLITUS WITH HYPERGLYCEMIA (HCC): ICD-10-CM

## 2021-01-18 NOTE — TELEPHONE ENCOUNTER
LM on  regarding lab results. Will send a trigl. Handout to his home. Encouraged that he take his Metformin 2 tabs in am and 2 tabs in pm. Asked him to call back if he is not tolerating his meds.A1c ordered for 3 months out.  YULIYA Avalos.

## 2021-03-15 DIAGNOSIS — Z23 NEED FOR VACCINATION: ICD-10-CM

## 2021-03-29 ENCOUNTER — HOSPITAL ENCOUNTER (OUTPATIENT)
Facility: MEDICAL CENTER | Age: 61
End: 2021-03-30
Attending: EMERGENCY MEDICINE | Admitting: HOSPITALIST
Payer: MEDICAID

## 2021-03-29 ENCOUNTER — APPOINTMENT (OUTPATIENT)
Dept: RADIOLOGY | Facility: MEDICAL CENTER | Age: 61
End: 2021-03-29
Attending: NURSE PRACTITIONER
Payer: MEDICAID

## 2021-03-29 ENCOUNTER — APPOINTMENT (OUTPATIENT)
Dept: RADIOLOGY | Facility: MEDICAL CENTER | Age: 61
End: 2021-03-29
Attending: EMERGENCY MEDICINE
Payer: MEDICAID

## 2021-03-29 ENCOUNTER — TELEPHONE (OUTPATIENT)
Dept: VASCULAR LAB | Facility: MEDICAL CENTER | Age: 61
End: 2021-03-29

## 2021-03-29 DIAGNOSIS — H53.2 DIPLOPIA: ICD-10-CM

## 2021-03-29 DIAGNOSIS — E87.6 HYPOKALEMIA: ICD-10-CM

## 2021-03-29 DIAGNOSIS — H02.402 PTOSIS OF LEFT EYELID: ICD-10-CM

## 2021-03-29 DIAGNOSIS — H51.0 GAZE PALSY: ICD-10-CM

## 2021-03-29 LAB
ANION GAP SERPL CALC-SCNC: 22 MMOL/L (ref 7–16)
BASOPHILS # BLD AUTO: 1.2 % (ref 0–1.8)
BASOPHILS # BLD: 0.1 K/UL (ref 0–0.12)
BUN SERPL-MCNC: 19 MG/DL (ref 8–22)
CALCIUM SERPL-MCNC: 10.2 MG/DL (ref 8.5–10.5)
CHLORIDE SERPL-SCNC: 89 MMOL/L (ref 96–112)
CO2 SERPL-SCNC: 24 MMOL/L (ref 20–33)
CREAT SERPL-MCNC: 0.8 MG/DL (ref 0.5–1.4)
EKG IMPRESSION: NORMAL
EOSINOPHIL # BLD AUTO: 0.24 K/UL (ref 0–0.51)
EOSINOPHIL NFR BLD: 2.9 % (ref 0–6.9)
ERYTHROCYTE [DISTWIDTH] IN BLOOD BY AUTOMATED COUNT: 43.6 FL (ref 35.9–50)
ERYTHROCYTE [SEDIMENTATION RATE] IN BLOOD BY WESTERGREN METHOD: 3 MM/HOUR (ref 0–20)
GLUCOSE BLD-MCNC: 180 MG/DL (ref 65–99)
GLUCOSE SERPL-MCNC: 136 MG/DL (ref 65–99)
HCT VFR BLD AUTO: 52.8 % (ref 42–52)
HGB BLD-MCNC: 17.4 G/DL (ref 14–18)
IMM GRANULOCYTES # BLD AUTO: 0.03 K/UL (ref 0–0.11)
IMM GRANULOCYTES NFR BLD AUTO: 0.4 % (ref 0–0.9)
INR PPP: 1.06 (ref 0.87–1.13)
LYMPHOCYTES # BLD AUTO: 1.52 K/UL (ref 1–4.8)
LYMPHOCYTES NFR BLD: 18.2 % (ref 22–41)
MCH RBC QN AUTO: 27.9 PG (ref 27–33)
MCHC RBC AUTO-ENTMCNC: 33 G/DL (ref 33.7–35.3)
MCV RBC AUTO: 84.6 FL (ref 81.4–97.8)
MONOCYTES # BLD AUTO: 0.7 K/UL (ref 0–0.85)
MONOCYTES NFR BLD AUTO: 8.4 % (ref 0–13.4)
NEUTROPHILS # BLD AUTO: 5.77 K/UL (ref 1.82–7.42)
NEUTROPHILS NFR BLD: 68.9 % (ref 44–72)
NRBC # BLD AUTO: 0 K/UL
NRBC BLD-RTO: 0 /100 WBC
PLATELET # BLD AUTO: 251 K/UL (ref 164–446)
PMV BLD AUTO: 10.4 FL (ref 9–12.9)
POTASSIUM SERPL-SCNC: 2.9 MMOL/L (ref 3.6–5.5)
PROTHROMBIN TIME: 14.1 SEC (ref 12–14.6)
RBC # BLD AUTO: 6.24 M/UL (ref 4.7–6.1)
SARS-COV-2 RNA RESP QL NAA+PROBE: NOTDETECTED
SODIUM SERPL-SCNC: 135 MMOL/L (ref 135–145)
SPECIMEN SOURCE: NORMAL
WBC # BLD AUTO: 8.4 K/UL (ref 4.8–10.8)

## 2021-03-29 PROCEDURE — 85610 PROTHROMBIN TIME: CPT

## 2021-03-29 PROCEDURE — U0005 INFEC AGEN DETEC AMPLI PROBE: HCPCS

## 2021-03-29 PROCEDURE — G0378 HOSPITAL OBSERVATION PER HR: HCPCS

## 2021-03-29 PROCEDURE — 70498 CT ANGIOGRAPHY NECK: CPT

## 2021-03-29 PROCEDURE — 99219 PR INITIAL OBSERVATION CARE,LEVL II: CPT | Performed by: HOSPITALIST

## 2021-03-29 PROCEDURE — 93010 ELECTROCARDIOGRAM REPORT: CPT | Performed by: INTERNAL MEDICINE

## 2021-03-29 PROCEDURE — 85652 RBC SED RATE AUTOMATED: CPT

## 2021-03-29 PROCEDURE — 85025 COMPLETE CBC W/AUTO DIFF WBC: CPT

## 2021-03-29 PROCEDURE — 70496 CT ANGIOGRAPHY HEAD: CPT

## 2021-03-29 PROCEDURE — 83036 HEMOGLOBIN GLYCOSYLATED A1C: CPT

## 2021-03-29 PROCEDURE — U0003 INFECTIOUS AGENT DETECTION BY NUCLEIC ACID (DNA OR RNA); SEVERE ACUTE RESPIRATORY SYNDROME CORONAVIRUS 2 (SARS-COV-2) (CORONAVIRUS DISEASE [COVID-19]), AMPLIFIED PROBE TECHNIQUE, MAKING USE OF HIGH THROUGHPUT TECHNOLOGIES AS DESCRIBED BY CMS-2020-01-R: HCPCS

## 2021-03-29 PROCEDURE — 70551 MRI BRAIN STEM W/O DYE: CPT

## 2021-03-29 PROCEDURE — 700117 HCHG RX CONTRAST REV CODE 255: Performed by: NURSE PRACTITIONER

## 2021-03-29 PROCEDURE — 80048 BASIC METABOLIC PNL TOTAL CA: CPT

## 2021-03-29 PROCEDURE — 73140 X-RAY EXAM OF FINGER(S): CPT | Mod: LT

## 2021-03-29 PROCEDURE — 70540 MRI ORBIT/FACE/NECK W/O DYE: CPT

## 2021-03-29 PROCEDURE — 82962 GLUCOSE BLOOD TEST: CPT

## 2021-03-29 PROCEDURE — 83735 ASSAY OF MAGNESIUM: CPT

## 2021-03-29 PROCEDURE — 700102 HCHG RX REV CODE 250 W/ 637 OVERRIDE(OP): Performed by: STUDENT IN AN ORGANIZED HEALTH CARE EDUCATION/TRAINING PROGRAM

## 2021-03-29 PROCEDURE — 96372 THER/PROPH/DIAG INJ SC/IM: CPT | Mod: XU

## 2021-03-29 PROCEDURE — 93005 ELECTROCARDIOGRAM TRACING: CPT | Performed by: HOSPITALIST

## 2021-03-29 PROCEDURE — 700101 HCHG RX REV CODE 250: Performed by: EMERGENCY MEDICINE

## 2021-03-29 PROCEDURE — A9270 NON-COVERED ITEM OR SERVICE: HCPCS | Performed by: STUDENT IN AN ORGANIZED HEALTH CARE EDUCATION/TRAINING PROGRAM

## 2021-03-29 PROCEDURE — 99245 OFF/OP CONSLTJ NEW/EST HI 55: CPT | Performed by: PSYCHIATRY & NEUROLOGY

## 2021-03-29 PROCEDURE — A9576 INJ PROHANCE MULTIPACK: HCPCS | Performed by: NURSE PRACTITIONER

## 2021-03-29 PROCEDURE — 99285 EMERGENCY DEPT VISIT HI MDM: CPT

## 2021-03-29 PROCEDURE — A9270 NON-COVERED ITEM OR SERVICE: HCPCS | Performed by: HOSPITALIST

## 2021-03-29 PROCEDURE — 700102 HCHG RX REV CODE 250 W/ 637 OVERRIDE(OP): Performed by: HOSPITALIST

## 2021-03-29 PROCEDURE — 700117 HCHG RX CONTRAST REV CODE 255: Performed by: EMERGENCY MEDICINE

## 2021-03-29 RX ORDER — TELMISARTAN 80 MG/1
40 TABLET ORAL DAILY
Status: DISCONTINUED | OUTPATIENT
Start: 2021-03-30 | End: 2021-03-30 | Stop reason: HOSPADM

## 2021-03-29 RX ORDER — LATANOPROST 50 UG/ML
1 SOLUTION/ DROPS OPHTHALMIC NIGHTLY
Status: DISCONTINUED | OUTPATIENT
Start: 2021-03-29 | End: 2021-03-30 | Stop reason: HOSPADM

## 2021-03-29 RX ORDER — METFORMIN HYDROCHLORIDE 500 MG/1
500 TABLET, EXTENDED RELEASE ORAL 2 TIMES DAILY
COMMUNITY
Start: 2021-03-26 | End: 2023-08-23

## 2021-03-29 RX ORDER — DEXTROSE MONOHYDRATE 25 G/50ML
50 INJECTION, SOLUTION INTRAVENOUS
Status: DISCONTINUED | OUTPATIENT
Start: 2021-03-29 | End: 2021-03-30 | Stop reason: HOSPADM

## 2021-03-29 RX ORDER — POTASSIUM CHLORIDE 20 MEQ/1
40 TABLET, EXTENDED RELEASE ORAL ONCE
Status: COMPLETED | OUTPATIENT
Start: 2021-03-29 | End: 2021-03-29

## 2021-03-29 RX ORDER — AMOXICILLIN 250 MG
2 CAPSULE ORAL 2 TIMES DAILY
Status: DISCONTINUED | OUTPATIENT
Start: 2021-03-29 | End: 2021-03-30 | Stop reason: HOSPADM

## 2021-03-29 RX ORDER — MULTIVIT-MIN/IRON/FOLIC ACID/K 18-600-40
2000 CAPSULE ORAL DAILY
COMMUNITY
End: 2021-05-27

## 2021-03-29 RX ORDER — LORAZEPAM 2 MG/ML
1 INJECTION INTRAMUSCULAR ONCE
Status: DISPENSED | OUTPATIENT
Start: 2021-03-29 | End: 2021-03-30

## 2021-03-29 RX ORDER — POLYETHYLENE GLYCOL 3350 17 G/17G
1 POWDER, FOR SOLUTION ORAL
Status: DISCONTINUED | OUTPATIENT
Start: 2021-03-29 | End: 2021-03-30 | Stop reason: HOSPADM

## 2021-03-29 RX ORDER — CARBOXYMETHYLCELLULOSE SODIUM 5 MG/ML
1 SOLUTION/ DROPS OPHTHALMIC PRN
Status: DISCONTINUED | OUTPATIENT
Start: 2021-03-29 | End: 2021-03-30 | Stop reason: HOSPADM

## 2021-03-29 RX ORDER — INSULIN GLARGINE 100 [IU]/ML
10 INJECTION, SOLUTION SUBCUTANEOUS EVERY EVENING
Status: DISCONTINUED | OUTPATIENT
Start: 2021-03-29 | End: 2021-03-29

## 2021-03-29 RX ORDER — TELMISARTAN 80 MG/1
40 TABLET ORAL DAILY
COMMUNITY
Start: 2021-03-26 | End: 2021-04-14

## 2021-03-29 RX ORDER — LACTOBACILLUS RHAMNOSUS GG 10B CELL
1 CAPSULE ORAL DAILY
COMMUNITY

## 2021-03-29 RX ORDER — PROPARACAINE HYDROCHLORIDE 5 MG/ML
1 SOLUTION/ DROPS OPHTHALMIC ONCE
Status: COMPLETED | OUTPATIENT
Start: 2021-03-29 | End: 2021-03-29

## 2021-03-29 RX ORDER — ACETAMINOPHEN 325 MG/1
650 TABLET ORAL EVERY 6 HOURS PRN
Status: DISCONTINUED | OUTPATIENT
Start: 2021-03-29 | End: 2021-03-30 | Stop reason: HOSPADM

## 2021-03-29 RX ORDER — BISACODYL 10 MG
10 SUPPOSITORY, RECTAL RECTAL
Status: DISCONTINUED | OUTPATIENT
Start: 2021-03-29 | End: 2021-03-30 | Stop reason: HOSPADM

## 2021-03-29 RX ADMIN — LATANOPROST 1 DROP: 50 SOLUTION OPHTHALMIC at 22:11

## 2021-03-29 RX ADMIN — ACETAMINOPHEN 325 MG: 325 TABLET ORAL at 18:22

## 2021-03-29 RX ADMIN — PROPARACAINE HYDROCHLORIDE 1 DROP: 5 SOLUTION/ DROPS OPHTHALMIC at 13:30

## 2021-03-29 RX ADMIN — POTASSIUM CHLORIDE 40 MEQ: 1500 TABLET, EXTENDED RELEASE ORAL at 18:22

## 2021-03-29 RX ADMIN — INSULIN LISPRO 1 UNITS: 100 INJECTION, SOLUTION INTRAVENOUS; SUBCUTANEOUS at 22:12

## 2021-03-29 RX ADMIN — CARBOXYMETHYLCELLULOSE SODIUM 1 DROP: 5 SOLUTION/ DROPS OPHTHALMIC at 22:10

## 2021-03-29 RX ADMIN — GADOTERIDOL 15 ML: 279.3 INJECTION, SOLUTION INTRAVENOUS at 17:01

## 2021-03-29 RX ADMIN — FLUORESCEIN SODIUM 1 MG: 1 STRIP OPHTHALMIC at 13:30

## 2021-03-29 RX ADMIN — IOHEXOL 80 ML: 350 INJECTION, SOLUTION INTRAVENOUS at 16:38

## 2021-03-29 ASSESSMENT — PAIN DESCRIPTION - PAIN TYPE
TYPE: ACUTE PAIN
TYPE: ACUTE PAIN

## 2021-03-29 ASSESSMENT — COGNITIVE AND FUNCTIONAL STATUS - GENERAL
CLIMB 3 TO 5 STEPS WITH RAILING: A LITTLE
STANDING UP FROM CHAIR USING ARMS: A LITTLE
DAILY ACTIVITIY SCORE: 24
WALKING IN HOSPITAL ROOM: A LITTLE
SUGGESTED CMS G CODE MODIFIER MOBILITY: CJ
MOBILITY SCORE: 21
SUGGESTED CMS G CODE MODIFIER DAILY ACTIVITY: CH

## 2021-03-29 ASSESSMENT — PATIENT HEALTH QUESTIONNAIRE - PHQ9
6. FEELING BAD ABOUT YOURSELF - OR THAT YOU ARE A FAILURE OR HAVE LET YOURSELF OR YOUR FAMILY DOWN: NOT AL ALL
SUM OF ALL RESPONSES TO PHQ9 QUESTIONS 1 AND 2: 1
9. THOUGHTS THAT YOU WOULD BE BETTER OFF DEAD, OR OF HURTING YOURSELF: NOT AT ALL
SUM OF ALL RESPONSES TO PHQ QUESTIONS 1-9: 2
1. LITTLE INTEREST OR PLEASURE IN DOING THINGS: NOT AT ALL
3. TROUBLE FALLING OR STAYING ASLEEP OR SLEEPING TOO MUCH: SEVERAL DAYS
4. FEELING TIRED OR HAVING LITTLE ENERGY: NOT AT ALL
5. POOR APPETITE OR OVEREATING: NOT AT ALL
7. TROUBLE CONCENTRATING ON THINGS, SUCH AS READING THE NEWSPAPER OR WATCHING TELEVISION: NOT AT ALL
2. FEELING DOWN, DEPRESSED, IRRITABLE, OR HOPELESS: SEVERAL DAYS
8. MOVING OR SPEAKING SO SLOWLY THAT OTHER PEOPLE COULD HAVE NOTICED. OR THE OPPOSITE, BEING SO FIGETY OR RESTLESS THAT YOU HAVE BEEN MOVING AROUND A LOT MORE THAN USUAL: NOT AT ALL

## 2021-03-29 ASSESSMENT — LIFESTYLE VARIABLES
TOTAL SCORE: 0
EVER HAD A DRINK FIRST THING IN THE MORNING TO STEADY YOUR NERVES TO GET RID OF A HANGOVER: NO
HAVE YOU EVER FELT YOU SHOULD CUT DOWN ON YOUR DRINKING: NO
TOTAL SCORE: 0
AVERAGE NUMBER OF DAYS PER WEEK YOU HAVE A DRINK CONTAINING ALCOHOL: 0
HAVE PEOPLE ANNOYED YOU BY CRITICIZING YOUR DRINKING: NO
ON A TYPICAL DAY WHEN YOU DRINK ALCOHOL HOW MANY DRINKS DO YOU HAVE: 0
EVER FELT BAD OR GUILTY ABOUT YOUR DRINKING: NO
ALCOHOL_USE: NO
TOTAL SCORE: 0
CONSUMPTION TOTAL: NEGATIVE
DOES PATIENT WANT TO STOP DRINKING: NO
HOW MANY TIMES IN THE PAST YEAR HAVE YOU HAD 5 OR MORE DRINKS IN A DAY: 0

## 2021-03-29 ASSESSMENT — FIBROSIS 4 INDEX
FIB4 SCORE: 1.2
FIB4 SCORE: 0.89

## 2021-03-29 NOTE — ED NOTES
Med rec updated and complete. Allergies reviewed.  Pt denies antibiotic use in last 14 days.    Pt usually  Uses [facility] [address].    If discharged on any medications pt will use  [facility] [phone]

## 2021-03-29 NOTE — ED PROVIDER NOTES
ED Provider Note    CHIEF COMPLAINT  Chief Complaint   Patient presents with   • Eye Pain     L eye burning pain and unable to open eyelid since last Thursday.  Pt saw optometrist on March 9th and had injection in R eye and dilation to both eyes.  Pt reports burning after the dilation which improved but is still there.  Pt tried to get into optometrist today and is unable to be seen.   • Digit Pain     L index finger swelling/redness at first knuckle x 2 months.  Pt also had a fall approx 2 months ago which he may have injured his finger but his PCP also wanted r/t gout vs infection.       HPI  Marcello Perez is a 60 y.o. male who presents complaining of left eye burning and an inability to open his left eyelid since Thursday.    Patient reports being treated for glaucoma by Dr. Gotti from ophthalmology in November and December of last year.  He has been having his pressures monitored since then they have been well controlled.  He continues to use latanoprost and timolol with dorzolamide as directed.    He saw Dr. Wilson recently for an injection in his right eye.  The day prior to this procedure he was dilated and had some burning in his left eye.    Last week he noted a dull left frontal headache that has since resolved but realized that his left eyelid was not opening normally on Thursday.  He also noted some swelling of the eyelid.  He reports blurred vision which is better when looking with both eyes but blurred in each eye when covering the opposite eye.    Patient also states that his left index finger has been swollen and red since he fell 2 months ago.  He is supposed to get an x-ray with his PCP who suspects gout.      ALLERGIES  Allergies   Allergen Reactions   • Hctz [Hydrochlorothiazide]      Rash on throat when exposed to excess sun       CURRENT MEDICATIONS  Home Medications     Reviewed by Mishel Love (Pharmacy Tech) on 03/29/21 at 1512  Med List Status: Complete   Medication Last Dose  "Status   chlorthalidone (HYGROTON) 25 MG Tab 3/29/2021 Active   Empagliflozin-linaGLIPtin (GLYXAMBI) 25-5 MG Tab 3/29/2021 Active   lactobacillus rhamnosus (CULTURELLE) Cap capsule 3/29/2021 Active   latanoprost (XALATAN) 0.005 % Solution 3/28/2021 Active   metFORMIN (GLUCOPHAGE) 500 MG Tab Not Taking Active   metFORMIN ER (GLUCOPHAGE XR) 500 MG TABLET SR 24 HR 3/29/2021 Active   telmisartan (MICARDIS) 80 MG Tab 3/29/2021 Active   Vitamin D, Cholecalciferol, 50 MCG (2000 UT) Cap 3/29/2021 Active                PAST MEDICAL HISTORY   has a past medical history of Bell's palsy, Diabetes, Hypertension, and Sleep apnea.    SURGICAL HISTORY   has a past surgical history that includes nasal septal reconst.    SOCIAL HISTORY  Social History     Tobacco Use   • Smoking status: Never Smoker   • Smokeless tobacco: Never Used   Substance and Sexual Activity   • Alcohol use: Yes     Comment: occ   • Drug use: No   • Sexual activity: Not on file       REVIEW OF SYSTEMS  See HPI for further details.  All other systems are negative except as above in HPI.    PHYSICAL EXAM  VITAL SIGNS: /88   Pulse (!) 103   Temp 36.3 °C (97.4 °F) (Temporal)   Resp 14   Ht 1.803 m (5' 11\")   Wt 80.8 kg (178 lb 2.1 oz)   SpO2 97%   BMI 24.84 kg/m²     General:  WDWN male, nontoxic appearing in NAD; A+Ox3; V/S as above; elevated blood pressure, tachycardic at triage but not on my exam  Skin: warm and dry; good color; no rash  HEENT: NCAT; left conjunctiva is slightly injected; no lid edema or erythema; no discharge or tearing; pupils are reactive to light; right pupil is at 3 mm, left pupil 2 mm; see cranial nerve exam for extraocular movement exam; no scleral icterus   Neck: FROM; soft  Extremities: RAMIREZ x 4; no e/o trauma; no pedal edema  Neurologic: Ptosis of the left upper eyelid is noted with a left medial gaze palsy; the patient is unable to move his left eye past the midline when looking medially; speech clear; distal sensation " intact; strength 5/5 UE/LEs; gait steady  Psychiatric: Appropriate affect, normal mood    LABS  Results for orders placed or performed during the hospital encounter of 03/29/21   CBC WITH DIFFERENTIAL   Result Value Ref Range    WBC 8.4 4.8 - 10.8 K/uL    RBC 6.24 (H) 4.70 - 6.10 M/uL    Hemoglobin 17.4 14.0 - 18.0 g/dL    Hematocrit 52.8 (H) 42.0 - 52.0 %    MCV 84.6 81.4 - 97.8 fL    MCH 27.9 27.0 - 33.0 pg    MCHC 33.0 (L) 33.7 - 35.3 g/dL    RDW 43.6 35.9 - 50.0 fL    Platelet Count 251 164 - 446 K/uL    MPV 10.4 9.0 - 12.9 fL    Neutrophils-Polys 68.90 44.00 - 72.00 %    Lymphocytes 18.20 (L) 22.00 - 41.00 %    Monocytes 8.40 0.00 - 13.40 %    Eosinophils 2.90 0.00 - 6.90 %    Basophils 1.20 0.00 - 1.80 %    Immature Granulocytes 0.40 0.00 - 0.90 %    Nucleated RBC 0.00 /100 WBC    Neutrophils (Absolute) 5.77 1.82 - 7.42 K/uL    Lymphs (Absolute) 1.52 1.00 - 4.80 K/uL    Monos (Absolute) 0.70 0.00 - 0.85 K/uL    Eos (Absolute) 0.24 0.00 - 0.51 K/uL    Baso (Absolute) 0.10 0.00 - 0.12 K/uL    Immature Granulocytes (abs) 0.03 0.00 - 0.11 K/uL    NRBC (Absolute) 0.00 K/uL   BASIC METABOLIC PANEL   Result Value Ref Range    Sodium 135 135 - 145 mmol/L    Potassium 2.9 (L) 3.6 - 5.5 mmol/L    Chloride 89 (L) 96 - 112 mmol/L    Co2 24 20 - 33 mmol/L    Glucose 136 (H) 65 - 99 mg/dL    Bun 19 8 - 22 mg/dL    Creatinine 0.80 0.50 - 1.40 mg/dL    Calcium 10.2 8.5 - 10.5 mg/dL    Anion Gap 22.0 (H) 7.0 - 16.0   PROTHROMBIN TIME   Result Value Ref Range    PT 14.1 12.0 - 14.6 sec    INR 1.06 0.87 - 1.13   ESTIMATED GFR   Result Value Ref Range    GFR If African American >60 >60 mL/min/1.73 m 2    GFR If Non African American >60 >60 mL/min/1.73 m 2   SARS-CoV-2 PCR (24 hour In-House): Collect NP swab in VTM    Specimen: Respirate   Result Value Ref Range    SARS-CoV-2 Source NP Swab          IMAGING  DX-FINGER(S) 2+ LEFT   Final Result      Mild soft tissue swelling overlying the dorsum of the distal left second digit  without underlying acute osseous abnormality.      CT-CTA HEAD WITH & W/O-POST PROCESS    (Results Pending)   CT-CTA NECK WITH & W/O-POST PROCESSING    (Results Pending)   MR-BRAIN-W/O    (Results Pending)   MR-ORBITS, FACE, NECK-W/O    (Results Pending)       MEDICAL RECORD  I have reviewed patient's medical record and pertinent results are listed below.      COURSE & MEDICAL DECISION MAKING  I have reviewed any medical record information, laboratory studies and radiographic results as noted.    Marcello Perez is a 60 y.o. male who presents complaining of blurred vision and left eye burning.  He has ptosis    Appropriate PPE was worn at all times while interacting with the patient, including goggles, N95 mask, and surgical mask.    Slit-lamp exam performed after instillation of fluorescein and proparacaine.  No corneal abrasion noted.  IOP is measured.  OS 14, OD 13.    Paging neurology and ophthalmology.    2:05 PM  Discussed with /ophthalmology who was made aware of pending imaging and updated as she had received a call from pt earlier today.    I discussed the case with Dr. Jimenez from neurology who agrees with CTA imaging and stroke work-up.      4:04 PM  I discussed the case with Dr. León from the hospitalist service who agrees to admit the patient.  I will sign the CT imaging results which are still pending out to Dr. Zamudio who can text Dr. León with results.        FINAL IMPRESSION  1. Diplopia     2. Ptosis of left eyelid     3. Gaze palsy     4. Hypokalemia         Electronically signed by: Tiffany Johnson M.D., 3/29/2021 1:09 PM

## 2021-03-29 NOTE — ED TRIAGE NOTES
"Marcello Perez 60 y.o. male ambulatory to triage for     Chief Complaint   Patient presents with   • Eye Pain     L eye burning pain and unable to open eyelid since last Thursday.  Pt saw optometrist on March 9th and had injection in R eye and dilation to both eyes.  Pt reports burning after the dilation which improved but is still there.  Pt tried to get into optometrist today and is unable to be seen.   • Digit Pain     L index finger swelling/redness at first knuckle x 2 months.  Pt also had a fall approx 2 months ago which he may have injured his finger but his PCP also wanted r/t gout vs infection.     Pt in NAD.  Pt reports vision to L eye normal when he holds his eyelid open to see.    /98   Pulse (!) 108   Temp 36.3 °C (97.4 °F) (Temporal)   Resp 14   Ht 1.803 m (5' 11\")   Wt 80.8 kg (178 lb 2.1 oz)   SpO2 97%   BMI 24.84 kg/m²   Pt returned to the lobby to await bed assignment.  Advised to return to the triage desk for any changes/concerns.  "

## 2021-03-29 NOTE — CONSULTS
Chief Complaint   Patient presents with   • Eye Pain     L eye burning pain and unable to open eyelid since last Thursday.  Pt saw optometrist on March 9th and had injection in R eye and dilation to both eyes.  Pt reports burning after the dilation which improved but is still there.  Pt tried to get into optometrist today and is unable to be seen.   • Digit Pain     L index finger swelling/redness at first knuckle x 2 months.  Pt also had a fall approx 2 months ago which he may have injured his finger but his PCP also wanted r/t gout vs infection.       Problem List Items Addressed This Visit     None      Visit Diagnoses     Diplopia        Ptosis of left eyelid        Gaze palsy        Hypokalemia            Neurology Consultation     History of present illness:  This is a 60-year old male with PMHx significant for type II Diabetes mellitus, associated retinopathy, glaucoma, and hypertension who presented to [facility] on 3/29/21 for a chief complaint of a 4-day history of Left eye ptosis. The patient states that he felt to be in his usual state of health until 3/25/21; he reports that during the day, he noted progressive difficulty keeping his Left eye open. He admits to associated Left sided headache and progressively worsening doubled vision and Left eye injection. He reports that he called his eye specialist on Friday 3/26/21, however was unable to be seen. Symptoms persisted over the following two days; this morning, he reports that he again spoke with ?his optometrist; this time, was referred to ED.  Here, SBP 150s; BG 130s. Stat CTA head/neck, MRI Brain and orbits are pending.   Currently, patient is sitting up in bed; awake and alert. States that he feels well despite Left eye ptosis and associated double vision. He denies headache, associated dizziness, focal weakness, sensory deficit, paresthesia, problem with speech or swallowing.     Neurology has been consulted by Dr. Maite Zamudio to further  evaluate the findings noted above.     Past medical history:   Past Medical History:   Diagnosis Date   • Bell's palsy    • Diabetes     diet controlled   • Hypertension    • Sleep apnea        Past surgical history:   Past Surgical History:   Procedure Laterality Date   • NASAL SEPTAL RECONST         Family history:   Family History   Problem Relation Age of Onset   • Clotting Disorder Neg Hx    • Stroke Neg Hx    • Heart Disease Neg Hx        Social history:   Social History     Socioeconomic History   • Marital status: Single     Spouse name: Not on file   • Number of children: Not on file   • Years of education: Not on file   • Highest education level: Not on file   Occupational History   • Not on file   Tobacco Use   • Smoking status: Never Smoker   • Smokeless tobacco: Never Used   Substance and Sexual Activity   • Alcohol use: Yes     Comment: occ   • Drug use: No   • Sexual activity: Not on file   Other Topics Concern   • Not on file   Social History Narrative   • Not on file     Social Determinants of Health     Financial Resource Strain:    • Difficulty of Paying Living Expenses:    Food Insecurity:    • Worried About Running Out of Food in the Last Year:    • Ran Out of Food in the Last Year:    Transportation Needs:    • Lack of Transportation (Medical):    • Lack of Transportation (Non-Medical):    Physical Activity:    • Days of Exercise per Week:    • Minutes of Exercise per Session:    Stress:    • Feeling of Stress :    Social Connections:    • Frequency of Communication with Friends and Family:    • Frequency of Social Gatherings with Friends and Family:    • Attends Congregation Services:    • Active Member of Clubs or Organizations:    • Attends Club or Organization Meetings:    • Marital Status:    Intimate Partner Violence:    • Fear of Current or Ex-Partner:    • Emotionally Abused:    • Physically Abused:    • Sexually Abused:        Current medications:   No current facility-administered  medications for this encounter.     Current Outpatient Medications   Medication   • Vitamin D, Cholecalciferol, 50 MCG (2000 UT) Cap   • lactobacillus rhamnosus (CULTURELLE) Cap capsule   • metFORMIN ER (GLUCOPHAGE XR) 500 MG TABLET SR 24 HR   • telmisartan (MICARDIS) 80 MG Tab   • chlorthalidone (HYGROTON) 25 MG Tab   • metFORMIN (GLUCOPHAGE) 500 MG Tab   • latanoprost (XALATAN) 0.005 % Solution   • Empagliflozin-linaGLIPtin (GLYXAMBI) 25-5 MG Tab       Medication Allergy:  Allergies   Allergen Reactions   • Hctz [Hydrochlorothiazide]      Rash on throat when exposed to excess sun       Review of systems:   Constitutional: denies fever, night sweats, weight loss.   Eyes: As noted above.   Ears, Nose, Mouth, Throat: denies nasal secretion, nasal bleeding, difficulty swallowing, hearing loss, tinnitus, vertigo, ear pain, acute dental problems, oral ulcers or lesions.   Endocrine: denies recent weight changes, heat or cold intolerance, polyuria, polydypsia, polyphagia,abnormal hair growth.  Cardiovascular: denies new onset of chest pain, palpitations, syncope, or dyspnea of exertion.  Pulmonary: denies shortness of breath, new onset of cough, hemoptysis, wheezing, chest pain or flu-like symptoms.   GI: denies nausea, vomiting, diarrhea, GI bleeding, change in appetite, abdominal pain, and change in bowel habits.  : denies dysuria, urinary incontinence, hematuria.  Heme/oncology: denies history of easy bruising or bleeding. No history of cancer, DVTor PE.  Allergy/immunology: denies hives/urticaria, or itching.   Dermatologic: denies new rash, or new skin lesions.  Musculoskeletal:denies joint swelling or pain, muscle pain, neck and back pain.   Neurologic: As noted above.   Psychiatric: denies symptoms of depression, anxiety, hallucinations, mood swings or changes, suicidal or homicidal thoughts.       Physical examination:   Vitals:    03/29/21 1107 03/29/21 1118 03/29/21 1249 03/29/21 1424   BP: 131/98  (!) 163/92  "142/88   Pulse: (!) 108  (!) 104 (!) 103   Resp: 14      Temp: 36.3 °C (97.4 °F)      TempSrc: Temporal      SpO2: 97%  95% 97%   Weight:  80.8 kg (178 lb 2.1 oz)     Height: 1.803 m (5' 11\")        General: Patient in no acute distress, pleasant and cooperative.  HEENT: Normocephalic, no signs of acute trauma.   Neck: supple, no meningeal signs or carotid bruits. There is normal range of motion. No tenderness on exam.   Chest: clear to auscultation. No cough.   CV: RRR, no murmurs.   Skin: no signs of acute rashes or trauma.   Musculoskeletal: joints exhibit full range of motion, without any pain to palpation. There are no signs of joint or muscle swelling. There is no tenderness to deep palpation of muscles.   Psychiatric: No hallucinatory behavior. Denies symptoms of depression or suicidal ideation. Mood and affect appear normal on exam.     NEUROLOGICAL EXAM:   Mental status, orientation: Awake, alert and fully oriented.   Speech and language: speech is clear and fluent. The patient is able to name, repeat and comprehend.   Memory: There is intact recollection of recent and remote events.   Cranial nerve exam: Pupils are 3-4 mm bilaterally and equally reactive to light.  Visual fields are intact by confrontation. There is no nystagmus on primary or secondary gaze. Dense CN III palsy appreciated on the Left with associated ptosis; no obvious pupillary defect. Face appears symmetric. Sensation in the face is intact to light touch. Uvula is midline. Palate elevates symmetrically. Tongue is midline and without any signs of tongue biting or fasciculations. Sternocleidomastoid muscles exhibit is normal strength bilaterally. Shoulder shrug is intact bilaterally.   Motor exam: Strength is 5/5 in all extremities. Tone is normal. No abnormal movements were seen on exam.   Sensory exam reveals normal sense of light touch and pinprick in all extremities.   Deep tendon reflexes:  2+ throughout. Plantar responses are flexor. " There is no clonus.   Coordination: shows a normal finger-nose-finger and heel shin; no ataxia/dysmetria. Normal rapidly alternating movements.   Gait: Not assessed at this time as patient is a fall risk.       NIH Stroke Scale    1a Level of Consciousness   1b Orientation Questions   1c Response to Commands   2 Gaze 1  3 Visual Fields   4 Facial Movement   5 Motor Function (arm)   a Left   b Right   6 Motor Function (leg)   a Left   b Right   7 Limb Ataxia   8 Sensory   9 Language   10 Articulation   11 Extinction/Inattention     Score: 1      ANCILLARY DATA REVIEWED:     Lab Data Review:  Recent Results (from the past 24 hour(s))   CBC WITH DIFFERENTIAL    Collection Time: 03/29/21  2:15 PM   Result Value Ref Range    WBC 8.4 4.8 - 10.8 K/uL    RBC 6.24 (H) 4.70 - 6.10 M/uL    Hemoglobin 17.4 14.0 - 18.0 g/dL    Hematocrit 52.8 (H) 42.0 - 52.0 %    MCV 84.6 81.4 - 97.8 fL    MCH 27.9 27.0 - 33.0 pg    MCHC 33.0 (L) 33.7 - 35.3 g/dL    RDW 43.6 35.9 - 50.0 fL    Platelet Count 251 164 - 446 K/uL    MPV 10.4 9.0 - 12.9 fL    Neutrophils-Polys 68.90 44.00 - 72.00 %    Lymphocytes 18.20 (L) 22.00 - 41.00 %    Monocytes 8.40 0.00 - 13.40 %    Eosinophils 2.90 0.00 - 6.90 %    Basophils 1.20 0.00 - 1.80 %    Immature Granulocytes 0.40 0.00 - 0.90 %    Nucleated RBC 0.00 /100 WBC    Neutrophils (Absolute) 5.77 1.82 - 7.42 K/uL    Lymphs (Absolute) 1.52 1.00 - 4.80 K/uL    Monos (Absolute) 0.70 0.00 - 0.85 K/uL    Eos (Absolute) 0.24 0.00 - 0.51 K/uL    Baso (Absolute) 0.10 0.00 - 0.12 K/uL    Immature Granulocytes (abs) 0.03 0.00 - 0.11 K/uL    NRBC (Absolute) 0.00 K/uL   BASIC METABOLIC PANEL    Collection Time: 03/29/21  2:15 PM   Result Value Ref Range    Sodium 135 135 - 145 mmol/L    Potassium 2.9 (L) 3.6 - 5.5 mmol/L    Chloride 89 (L) 96 - 112 mmol/L    Co2 24 20 - 33 mmol/L    Glucose 136 (H) 65 - 99 mg/dL    Bun 19 8 - 22 mg/dL    Creatinine 0.80 0.50 - 1.40 mg/dL    Calcium 10.2 8.5 - 10.5 mg/dL    Anion Gap  22.0 (H) 7.0 - 16.0   PROTHROMBIN TIME    Collection Time: 03/29/21  2:15 PM   Result Value Ref Range    PT 14.1 12.0 - 14.6 sec    INR 1.06 0.87 - 1.13   ESTIMATED GFR    Collection Time: 03/29/21  2:15 PM   Result Value Ref Range    GFR If African American >60 >60 mL/min/1.73 m 2    GFR If Non African American >60 >60 mL/min/1.73 m 2   SARS-CoV-2 PCR (24 hour In-House): Collect NP swab in VTM    Collection Time: 03/29/21  2:55 PM    Specimen: Respirate   Result Value Ref Range    SARS-CoV-2 Source NP Swab        Labs reviewed by me.       Imaging reviewed by me:     DX-FINGER(S) 2+ LEFT   Final Result      Mild soft tissue swelling overlying the dorsum of the distal left second digit without underlying acute osseous abnormality.      CT-CTA HEAD WITH & W/O-POST PROCESS    (Results Pending)   CT-CTA NECK WITH & W/O-POST PROCESSING    (Results Pending)   MR-BRAIN-W/O    (Results Pending)   MR-ORBITS, FACE, NECK-W/O    (Results Pending)       Presumed mechanism by TOAST:  __Large Artery Atherosclerosis  _X_Small Vessel (Lacunar)  __Cardioembolic  __Other (Sickle Cell, Vasculitis, Hypercoagulable)  __Unknown    Modified Saunders Scale (MRS): 0 = No symptoms      ASSESSMENT AND PLAN:  60-year old male with PMHx significant for type II Diabetes mellitus, associated retinopathy, glaucoma, and hypertension who presented to [facility] on 3/29/21 for a chief complaint of a 4-day history of Left eye ptosis; here, noted to have dense Left CN III palsy and diplopia with no other appreciable neurological deficits (negative Chance's, PERRL) . Unfortunately not a candidate for IV tPA secondary to presentation time greater than 4.5 hours from time LKW. Differential diagnoses include acute ischemic stroke (pontine) versus other ischemic neuropathy; lastly, could consider infectious process such as Fuentes Hunt syndrome, though have least suspicion for this given exclusive CN III involvement, no dermal lesions, and no facial  palsy.     Recommendations/Plan:     -q4h and PRN neuro assessment. VS per nursing/unit protocol. BP goal < 140/90 (No permissive HTN as patient is > 4 days from onset of symptoms). Antihypertensives per primary team.   -Note CTA head/neck with no LVO, aneurysm, nor hemodynamically significant stenosis.   -Obtain MRI Brain wo contrast, MRI Orbits. Will follow up with results and make additional recommendations accordingly.   -Telemetry; currently SR. Screen for Afib/arrhythmia. Obtain TTE with bubble study.   -ASA 81 mg PO q day and Atorvastatin 80 mg PO q HS. Check lipid panel.   -Recommend aggressive BG management per primary team. Avoid IVF with Dextrose. BG goal 140-180. Check hemoglobin A1c.   -Check ESR, CRP.   -PT/OT/SLP eval and treat (functional eval given new ptosis).   -Counseled patient at length regarding life style and risk factor modification for primary stroke prevention.   -Will follow up with results of the above and make additional recommendations accordingly. All other medical management per primary team.   -DVT PPX: SCDs.      The plan of care above has been discussed with Dr. Jimenez.     [email or web address].  [facility]

## 2021-03-30 ENCOUNTER — APPOINTMENT (OUTPATIENT)
Dept: CARDIOLOGY | Facility: MEDICAL CENTER | Age: 61
End: 2021-03-30
Attending: INTERNAL MEDICINE
Payer: MEDICAID

## 2021-03-30 VITALS
DIASTOLIC BLOOD PRESSURE: 80 MMHG | TEMPERATURE: 97.9 F | SYSTOLIC BLOOD PRESSURE: 126 MMHG | HEIGHT: 71 IN | WEIGHT: 176.37 LBS | BODY MASS INDEX: 24.69 KG/M2 | RESPIRATION RATE: 18 BRPM | OXYGEN SATURATION: 97 % | HEART RATE: 83 BPM

## 2021-03-30 DIAGNOSIS — E11.3593 TYPE 2 DIABETES MELLITUS WITH PROLIFERATIVE RETINOPATHY OF BOTH EYES, WITHOUT LONG-TERM CURRENT USE OF INSULIN, MACULAR EDEMA PRESENCE UNSPECIFIED, UNSPECIFIED PROLIFERATIVE RETINOPATHY TYPE (HCC): ICD-10-CM

## 2021-03-30 DIAGNOSIS — H49.02 LEFT OCULOMOTOR NERVE PALSY: ICD-10-CM

## 2021-03-30 PROBLEM — E87.6 HYPOKALEMIA: Status: RESOLVED | Noted: 2021-03-29 | Resolved: 2021-03-30

## 2021-03-30 LAB
ANION GAP SERPL CALC-SCNC: 17 MMOL/L (ref 7–16)
BUN SERPL-MCNC: 15 MG/DL (ref 8–22)
CALCIUM SERPL-MCNC: 9.4 MG/DL (ref 8.5–10.5)
CHLORIDE SERPL-SCNC: 96 MMOL/L (ref 96–112)
CHOLEST SERPL-MCNC: 180 MG/DL (ref 100–199)
CO2 SERPL-SCNC: 23 MMOL/L (ref 20–33)
CREAT SERPL-MCNC: 0.83 MG/DL (ref 0.5–1.4)
ERYTHROCYTE [DISTWIDTH] IN BLOOD BY AUTOMATED COUNT: 43.8 FL (ref 35.9–50)
EST. AVERAGE GLUCOSE BLD GHB EST-MCNC: 183 MG/DL
GLUCOSE BLD-MCNC: 132 MG/DL (ref 65–99)
GLUCOSE BLD-MCNC: 137 MG/DL (ref 65–99)
GLUCOSE BLD-MCNC: 94 MG/DL (ref 65–99)
GLUCOSE SERPL-MCNC: 100 MG/DL (ref 65–99)
HBA1C MFR BLD: 8 % (ref 4–5.6)
HCT VFR BLD AUTO: 50.8 % (ref 42–52)
HDLC SERPL-MCNC: 32 MG/DL
HGB BLD-MCNC: 16.5 G/DL (ref 14–18)
LDLC SERPL CALC-MCNC: 106 MG/DL
LV EJECT FRACT  99904: 65
LV EJECT FRACT MOD 2C 99903: 68.79
LV EJECT FRACT MOD 4C 99902: 72.55
LV EJECT FRACT MOD BP 99901: 70.42
MAGNESIUM SERPL-MCNC: 2 MG/DL (ref 1.5–2.5)
MCH RBC QN AUTO: 27.5 PG (ref 27–33)
MCHC RBC AUTO-ENTMCNC: 32.5 G/DL (ref 33.7–35.3)
MCV RBC AUTO: 84.7 FL (ref 81.4–97.8)
PLATELET # BLD AUTO: 254 K/UL (ref 164–446)
PMV BLD AUTO: 10.9 FL (ref 9–12.9)
POTASSIUM SERPL-SCNC: 3.3 MMOL/L (ref 3.6–5.5)
RBC # BLD AUTO: 6 M/UL (ref 4.7–6.1)
SODIUM SERPL-SCNC: 136 MMOL/L (ref 135–145)
TRIGL SERPL-MCNC: 210 MG/DL (ref 0–149)
WBC # BLD AUTO: 7.4 K/UL (ref 4.8–10.8)

## 2021-03-30 PROCEDURE — 99217 PR OBSERVATION CARE DISCHARGE: CPT | Performed by: INTERNAL MEDICINE

## 2021-03-30 PROCEDURE — 85027 COMPLETE CBC AUTOMATED: CPT

## 2021-03-30 PROCEDURE — 82962 GLUCOSE BLOOD TEST: CPT | Mod: 91

## 2021-03-30 PROCEDURE — 80061 LIPID PANEL: CPT

## 2021-03-30 PROCEDURE — 700102 HCHG RX REV CODE 250 W/ 637 OVERRIDE(OP): Performed by: HOSPITALIST

## 2021-03-30 PROCEDURE — A9270 NON-COVERED ITEM OR SERVICE: HCPCS | Performed by: HOSPITALIST

## 2021-03-30 PROCEDURE — 80048 BASIC METABOLIC PNL TOTAL CA: CPT

## 2021-03-30 PROCEDURE — G0378 HOSPITAL OBSERVATION PER HR: HCPCS

## 2021-03-30 PROCEDURE — 36415 COLL VENOUS BLD VENIPUNCTURE: CPT

## 2021-03-30 PROCEDURE — 93306 TTE W/DOPPLER COMPLETE: CPT | Mod: 26 | Performed by: INTERNAL MEDICINE

## 2021-03-30 PROCEDURE — A9270 NON-COVERED ITEM OR SERVICE: HCPCS | Performed by: STUDENT IN AN ORGANIZED HEALTH CARE EDUCATION/TRAINING PROGRAM

## 2021-03-30 PROCEDURE — 700102 HCHG RX REV CODE 250 W/ 637 OVERRIDE(OP): Performed by: STUDENT IN AN ORGANIZED HEALTH CARE EDUCATION/TRAINING PROGRAM

## 2021-03-30 PROCEDURE — 99214 OFFICE O/P EST MOD 30 MIN: CPT | Performed by: NURSE PRACTITIONER

## 2021-03-30 PROCEDURE — 93306 TTE W/DOPPLER COMPLETE: CPT

## 2021-03-30 RX ORDER — ATORVASTATIN CALCIUM 40 MG/1
40 TABLET, FILM COATED ORAL
Qty: 30 TABLET | Refills: 0 | Status: SHIPPED | OUTPATIENT
Start: 2021-03-30 | End: 2021-04-14 | Stop reason: SINTOL

## 2021-03-30 RX ORDER — ASPIRIN 81 MG/1
81 TABLET, CHEWABLE ORAL DAILY
Qty: 30 TABLET | Refills: 0 | Status: SHIPPED | OUTPATIENT
Start: 2021-03-30 | End: 2021-04-14 | Stop reason: SDUPTHER

## 2021-03-30 RX ADMIN — CARBOXYMETHYLCELLULOSE SODIUM 1 DROP: 5 SOLUTION/ DROPS OPHTHALMIC at 10:57

## 2021-03-30 RX ADMIN — ACETAMINOPHEN 650 MG: 325 TABLET ORAL at 15:43

## 2021-03-30 RX ADMIN — CARBOXYMETHYLCELLULOSE SODIUM 1 DROP: 5 SOLUTION/ DROPS OPHTHALMIC at 13:26

## 2021-03-30 RX ADMIN — CARBOXYMETHYLCELLULOSE SODIUM 1 DROP: 5 SOLUTION/ DROPS OPHTHALMIC at 05:34

## 2021-03-30 RX ADMIN — ACETAMINOPHEN 650 MG: 325 TABLET ORAL at 05:37

## 2021-03-30 RX ADMIN — CARBOXYMETHYLCELLULOSE SODIUM 1 DROP: 5 SOLUTION/ DROPS OPHTHALMIC at 00:33

## 2021-03-30 ASSESSMENT — PAIN DESCRIPTION - PAIN TYPE
TYPE: ACUTE PAIN

## 2021-03-30 NOTE — PROGRESS NOTES
Report received from [facility] shift RN, assumed patient care. Patient is A&O x 4, on room air. Tele box on and in place. Patient updated on plan of care and verbalizes no questions. Patient has call light within reach. Patient educated to call for assistance as needed. Will continue to monitor.

## 2021-03-30 NOTE — PROGRESS NOTES
Bedside report received from ER nurse Seymour. Assumed care of pt. PT transferred to [facility]-. Pt awake, laying in bed. A/Ox4, VSS. Pt reporting 5/10 left eye pain. Will medicate per MAR.  Pt oriented to unit and educated to call before getting out of bed. POC reviewed and white board updated. Tele box on. Call light in reach. Bed locked in lowest position with 2 upper bed rails up. Bed alarm on.

## 2021-03-30 NOTE — DISCHARGE INSTRUCTIONS
Discharge Instructions    Discharged to home by car with friend. Discharged via wheelchair, hospital escort: Yes.  Special equipment needed: Not Applicable    Be sure to schedule a follow-up appointment with your primary care doctor or any specialists as instructed.     Discharge Plan:   Influenza Vaccine Indication: Not indicated: Previously immunized this influenza season and > 8 years of age(Jan 2021)    I understand that a diet low in cholesterol, fat, and sodium is recommended for good health. Unless I have been given specific instructions below for another diet, I accept this instruction as my diet prescription.   Other diet: diabetic     Special Instructions: None    · Is patient discharged on Warfarin / Coumadin?   No     Depression / Suicide Risk    As you are discharged from this [facility] facility, it is important to learn how to keep safe from harming yourself.    Recognize the warning signs:  · Abrupt changes in personality, positive or negative- including increase in energy   · Giving away possessions  · Change in eating patterns- significant weight changes-  positive or negative  · Change in sleeping patterns- unable to sleep or sleeping all the time   · Unwillingness or inability to communicate  · Depression  · Unusual sadness, discouragement and loneliness  · Talk of wanting to die  · Neglect of personal appearance   · Rebelliousness- reckless behavior  · Withdrawal from people/activities they love  · Confusion- inability to concentrate     If you or a loved one observes any of these behaviors or has concerns about self-harm, here's what you can do:  · Talk about it- your feelings and reasons for harming yourself  · Remove any means that you might use to hurt yourself (examples: pills, rope, extension cords, firearm)  · Get professional help from the community (Mental Health, Substance Abuse, psychological counseling)  · Do not be alone:Call your Safe Contact- someone whom you trust who will be  there for you.  · Call your local CRISIS HOTLINE 105-5016 or 877-053-2142  · Call your local Children's Mobile Crisis Response Team [place] (179) 801-4833 or www.[employer]  · Call the toll free National Suicide Prevention Hotlines   · National Suicide Prevention Lifeline 437-751-REBC (4452)  · [facility] Line Network 800-SUICIDE (845-0022)      Stroke Prevention  Some medical conditions and lifestyle choices can lead to a higher risk for a stroke. You can help to prevent a stroke by making nutrition, lifestyle, and other changes.  What nutrition changes can be made?    · Eat healthy foods.  ? Choose foods that are high in fiber. These include:  § Fresh fruits.  § Fresh vegetables.  § Whole grains.  ? Eat at least 5 or more servings of fruits and vegetables each day. Try to fill half of your plate at each meal with fruits and vegetables.  ? Choose lean protein foods. These include:  § Lowfat (lean) cuts of meat.  § Chicken without skin.  § Fish.  § Tofu.  § Beans.  § Nuts.  ? Eat low-fat dairy products.  ? Avoid foods that:  § Are high in salt (sodium).  § Have saturated fat.  § Have trans fat.  § Have cholesterol.  § Are processed.  § Are premade.  · Follow eating guidelines as told by your doctor. These may include:  ? Reducing how many calories you eat and drink each day.  ? Limiting how much salt you eat or drink each day to 1,500 milligrams (mg).  ? Using only healthy fats for cooking. These include:  § Olive oil.  § Canola oil.  § Sunflower oil.  ? Counting how many carbohydrates you eat and drink each day.  What lifestyle changes can be made?  · Try to stay at a healthy weight. Talk to your doctor about what a good weight is for you.  · Get at least 30 minutes of moderate physical activity at least 5 days a week. This can include:  ? Fast walking.  ? Biking.  ? Swimming.  · Do not use any products that have nicotine or tobacco. This includes cigarettes and e-cigarettes. If you need help  quitting, ask your doctor. Avoid being around tobacco smoke in general.  · Limit how much alcohol you drink to no more than 1 drink a day for nonpregnant women and 2 drinks a day for men. One drink equals 12 oz of beer, 5 oz of wine, or 1½ oz of hard liquor.  · Do not use drugs.  · Avoid taking birth control pills. Talk to your doctor about the risks of taking birth control pills if:  ? You are over 35 years old.  ? You smoke.  ? You get migraines.  ? You have had a blood clot.  What other changes can be made?  · Manage your cholesterol.  ? It is important to eat a healthy diet.  ? If your cholesterol cannot be managed through your diet, you may also need to take medicines. Take medicines as told by your doctor.  · Manage your diabetes.  ? It is important to eat a healthy diet and to exercise regularly.  ? If your blood sugar cannot be managed through diet and exercise, you may need to take medicines. Take medicines as told by your doctor.  · Control your high blood pressure (hypertension).  ? Try to keep your blood pressure below 130/80. This can help lower your risk of stroke.  ? It is important to eat a healthy diet and to exercise regularly.  ? If your blood pressure cannot be managed through diet and exercise, you may need to take medicines. Take medicines as told by your doctor.  ? Ask your doctor if you should check your blood pressure at home.  ? Have your blood pressure checked every year. Do this even if your blood pressure is normal.  · Talk to your doctor about getting checked for a sleep disorder. Signs of this can include:  ? Snoring a lot.  ? Feeling very tired.  · Take over-the-counter and prescription medicines only as told by your doctor. These may include aspirin or blood thinners (antiplatelets or anticoagulants).  · Make sure that any other medical conditions you have are managed.  Where to find more information  · American Stroke Association: www.strokeassociation.org  · National Stroke  "Association: www.stroke.org  Get help right away if:  · You have any symptoms of stroke. \"BE FAST\" is an easy way to remember the main warning signs:  ? B - Balance. Signs are dizziness, sudden trouble walking, or loss of balance.  ? E - Eyes. Signs are trouble seeing or a sudden change in how you see.  ? F - Face. Signs are sudden weakness or loss of feeling of the face, or the face or eyelid drooping on one side.  ? A - Arms. Signs are weakness or loss of feeling in an arm. This happens suddenly and usually on one side of the body.  ? S - Speech. Signs are sudden trouble speaking, slurred speech, or trouble understanding what people say.  ? T - Time. Time to call emergency services. Write down what time symptoms started.  · You have other signs of stroke, such as:  ? A sudden, very bad headache with no known cause.  ? Feeling sick to your stomach (nausea).  ? Throwing up (vomiting).  ? Jerky movements you cannot control (seizure).  These symptoms may represent a serious problem that is an emergency. Do not wait to see if the symptoms will go away. Get medical help right away. Call your local emergency services (911 in the [place] Do not drive yourself to the hospital.  Summary  · You can prevent a stroke by eating healthy, exercising, not smoking, drinking less alcohol, and treating other health problems, such as diabetes, high blood pressure, or high cholesterol.  · Do not use any products that contain nicotine or tobacco, such as cigarettes and e-cigarettes.  · Get help right away if you have any signs or symptoms of a stroke.  This information is not intended to replace advice given to you by your health care provider. Make sure you discuss any questions you have with your health care provider.  Document Released: 06/18/2013 Document Revised: 02/13/2020 Document Reviewed: 03/21/2018  Elsevier Patient Education © 2020 [employer]    Ptosis Repair    Ptosis repair is a procedure to tighten a drooping upper " eyelid (ptosis). Ptosis usually happens when the eyelid muscles become weak and loose from aging. It may also be caused by an injury or nerve damage. Children can be born with ptosis (congenital ptosis).  You may need this surgery if ptosis is causing vision problems. You may need ptosis repair in one or both eyes.  Tell a health care provider about:  · Any allergies you have.  · All medicines you are taking, including vitamins, herbs, eye drops, creams, and over-the-counter medicines.  · Any problems you or family members have had with anesthetic medicines.  · Any blood disorders you have.  · Any surgeries you have had, especially vision correction surgery or other eye surgeries.  · Any medical conditions you have.  · Whether you are pregnant or may be pregnant.  What are the risks?  Generally, this is a safe procedure. However, problems may occur, including:  · Infection.  · Bleeding.  · Allergic reactions to medicines.  · Damage to muscles, nerves, or blood vessels near the eye.  · Dry eye.  · Under-correction or over-correction of ptosis that requires a second surgery.  · Your eyes appearing to be uneven (asymmetry).  What happens before the procedure?  Staying hydrated  Follow instructions from your health care provider about hydration, which may include:  · Up to 2 hours before the procedure - you may continue to drink clear liquids, such as water, clear fruit juice, black coffee, and plain tea.  Eating and drinking restrictions  Follow instructions from your health care provider about eating and drinking, which may include:  · 8 hours before the procedure - stop eating heavy meals or foods, such as meat, fried foods, or fatty foods.  · 6 hours before the procedure - stop eating light meals or foods, such as toast or cereal.  · 6 hours before the procedure - stop drinking milk or drinks that contain milk.  · 2 hours before the procedure - stop drinking clear liquids.  Medicines  Ask your health care provider  about:  · Changing or stopping your regular medicines. This is especially important if you are taking diabetes medicines or blood thinners.  · Taking medicines such as aspirin and ibuprofen. These medicines can thin your blood. Do not take these medicines unless your health care provider tells you to take them.  · Taking over-the-counter medicines, vitamins, herbs, and supplements.  Tests  · You will have a detailed eye exam to help your surgeon plan the procedure. Your surgeon will take measurements of your eyelid opening and test your eyelid muscles.  · You may have a test to check your side vision (peripheral vision).  General instructions  · Do not use any products that contain nicotine or tobacco for at least 4 weeks before the procedure. These products include cigarettes, e-cigarettes, and chewing tobacco. If you need help quitting, ask your health care provider.  · Plan to have someone take you home from the hospital or clinic.  · If you will be going home right after the procedure, plan to have someone with you for 24 hours.  · Ask your health care provider:  ? How your surgery site will be marked.  ? What steps will be taken to help prevent infection. These may include:  § Washing skin with a germ-killing soap.  § Using antibiotic medicine.  What happens during the procedure?  · An IV will be inserted into one of your veins.  · You will be given one or more of the following:  ? A medicine to help you relax (sedative).  ? A medicine to numb the area (local anesthetic).  ? A medicine to make you fall asleep (general anesthetic).  · Your surgeon will make an incision in a natural crease of skin near your eyelid. In some cases, the incision can be made on the underside of your eyelid.  · Your surgeon will find the muscle that lifts your eyelid. This muscle may be tightened with stitches (sutures). In some cases, a section of the muscle may be removed.  · If the eyelid muscle cannot lift the eyelid, it may be  attached with sutures to the muscle that raises your eyebrows.  · If there is excess skin in your upper eyelid, this skin may be removed.  · The incision will be closed with sutures.  · An antibiotic and moistening eye drop will be placed in your eye.  · If both eyes are being repaired, your surgeon may then do the procedure on your other eye.  The procedure may vary among health care providers and hospitals. The procedure will also vary depending on the condition of your eye.  What happens after the procedure?  · Your blood pressure, heart rate, breathing rate, and blood oxygen level will be monitored until you leave the hospital or clinic.  · Do not drive for 24 hours if you were given a sedative during your procedure.  Summary  · Ptosis repair is a procedure to tighten a drooping upper eyelid (ptosis).  · You may need this surgery if ptosis is causing vision problems.  · Follow instructions from your health care provider about taking medicines and about eating and drinking before the procedure.  · During the procedure, the muscle that lifts your eyelid may be tightened with stitches (sutures).  This information is not intended to replace advice given to you by your health care provider. Make sure you discuss any questions you have with your health care provider.  Document Released: 09/12/2002 Document Revised: 12/23/2019 Document Reviewed: 12/23/2019  [employer] Patient Education © 2020 [employer]      Aspirin, ASA oral tablets  What is this medicine?  ASPIRIN (AS pir in) is a pain reliever. It is used to treat mild pain and fever. This medicine is also used as directed by a doctor to prevent and to treat heart attacks, to prevent strokes and blood clots, and to treat arthritis or inflammation.  This medicine may be used for other purposes; ask your health care provider or pharmacist if you have questions.  COMMON BRAND NAME(S): Aspir-Low, Aspir-Sherri, Aspirtab, Tania Advanced Aspirin, Tania Aspirin, Tania  Aspirin Extra Strength, Tania Aspirin Plus, Tania Extra Strength, Tania Extra Strength Plus, Tania Genuine Aspirin, Tania Womens Aspirin, Bufferin, Bufferin Extra Strength, Bufferin Low Dose  What should I tell my health care provider before I take this medicine?  They need to know if you have any of these conditions:  · anemia  · asthma  · bleeding problems  · child with chickenpox, the flu, or other viral infection  · diabetes  · gout  · if you frequently drink alcohol containing drinks  · kidney disease  · liver disease  · low level of vitamin K  · lupus  · smoke tobacco  · stomach ulcers or other problems  · an unusual or allergic reaction to aspirin, tartrazine dye, other medicines, dyes, or preservatives  · pregnant or trying to get pregnant  · breast-feeding  How should I use this medicine?  Take this medicine by mouth with a glass of water. Follow the directions on the package or prescription label. You can take this medicine with or without food. If it upsets your stomach, take it with food. Do not take your medicine more often than directed.  Talk to your pediatrician regarding the use of this medicine in children. While this drug may be prescribed for children as young as 12 years of age for selected conditions, precautions do apply. Children and teenagers should not use this medicine to treat chicken pox or flu symptoms unless directed by a doctor.  Patients over 65 years old may have a stronger reaction and need a smaller dose.  Overdosage: If you think you have taken too much of this medicine contact a poison control center or emergency room at once.  NOTE: This medicine is only for you. Do not share this medicine with others.  What if I miss a dose?  If you are taking this medicine on a regular schedule and miss a dose, take it as soon as you can. If it is almost time for your next dose, take only that dose. Do not take double or extra doses.  What may interact with this medicine?  Do not take this  medicine with any of the following medications:  · cidofovir  · ketorolac  · probenecid  This medicine may also interact with the following medications:  · alcohol  · alendronate  · bismuth subsalicylate  · flavocoxid  · herbal supplements like feverfew, garlic, sam, ginkgo biloba, horse chestnut  · medicines for diabetes or glaucoma like acetazolamide, methazolamide  · medicines for gout  · medicines that treat or prevent blood clots like enoxaparin, heparin, ticlopidine, warfarin  · other aspirin and aspirin-like medicines  · NSAIDs, medicines for pain and inflammation, like ibuprofen or naproxen  · pemetrexed  · sulfinpyrazone  · varicella live vaccine  This list may not describe all possible interactions. Give your health care provider a list of all the medicines, herbs, non-prescription drugs, or dietary supplements you use. Also tell them if you smoke, drink alcohol, or use illegal drugs. Some items may interact with your medicine.  What should I watch for while using this medicine?  If you are treating yourself for pain, tell your doctor or health care professional if the pain lasts more than 10 days, if it gets worse, or if there is a new or different kind of pain. Tell your doctor if you see redness or swelling. Also, check with your doctor if you have a fever that lasts for more than 3 days. Only take this medicine to prevent heart attacks or blood clotting if prescribed by your doctor or health care professional.  Do not take aspirin or aspirin-like medicines with this medicine. Too much aspirin can be dangerous. Always read the labels carefully.  This medicine can irritate your stomach or cause bleeding problems. Do not smoke cigarettes or drink alcohol while taking this medicine. Do not lie down for 30 minutes after taking this medicine to prevent irritation to your throat.  If you are scheduled for any medical or dental procedure, tell your healthcare provider that you are taking this medicine. You  may need to stop taking this medicine before the procedure.  This medicine may be used to treat migraines. If you take migraine medicines for 10 or more days a month, your migraines may get worse. Keep a diary of headache days and medicine use. Contact your healthcare professional if your migraine attacks occur more frequently.  What side effects may I notice from receiving this medicine?  Side effects that you should report to your doctor or health care professional as soon as possible:  · allergic reactions like skin rash, itching or hives, swelling of the face, lips, or tongue  · breathing problems  · changes in hearing, ringing in the ears  · confusion  · general ill feeling or flu-like symptoms  · pain on swallowing  · redness, blistering, peeling or loosening of the skin, including inside the mouth or nose  · signs and symptoms of bleeding such as bloody or black, tarry stools; red or dark-brown urine; spitting up blood or brown material that looks like coffee grounds; red spots on the skin; unusual bruising or bleeding from the eye, gums, or nose  · trouble passing urine or change in the amount of urine  · unusually weak or tired  · yellowing of the eyes or skin  Side effects that usually do not require medical attention (report to your doctor or health care professional if they continue or are bothersome):  · diarrhea or constipation  · headache  · nausea, vomiting  · stomach gas, heartburn  This list may not describe all possible side effects. Call your doctor for medical advice about side effects. You may report side effects to FDA at 3-491-FDA-3318.  Where should I keep my medicine?  Keep out of the reach of children.  Store at room temperature between 15 and 30 degrees C (59 and 86 degrees F). Protect from heat and moisture. Do not use this medicine if it has a strong vinegar smell. Throw away any unused medicine after the expiration date.  NOTE: This sheet is a summary. It may not cover all possible  information. If you have questions about this medicine, talk to your doctor, pharmacist, or health care provider.  © 2020 [employer] (2018-01-30 10:42:13)  Atorvastatin tablets  What is this medicine?  ATORVASTATIN (a TORE va sta tin) is known as a HMG-CoA reductase inhibitor or 'statin'. It lowers the level of cholesterol and triglycerides in the blood. This drug may also reduce the risk of heart attack, stroke, or other health problems in patients with risk factors for heart disease. Diet and lifestyle changes are often used with this drug.  This medicine may be used for other purposes; ask your health care provider or pharmacist if you have questions.  COMMON BRAND NAME(S): Lipitor  What should I tell my health care provider before I take this medicine?  They need to know if you have any of these conditions:  · diabetes  · if you often drink alcohol  · history of stroke  · kidney disease  · liver disease  · muscle aches or weakness  · thyroid disease  · an unusual or allergic reaction to atorvastatin, other medicines, foods, dyes, or preservatives  · pregnant or trying to get pregnant  · breast-feeding  How should I use this medicine?  Take this medicine by mouth with a glass of water. Follow the directions on the prescription label. You can take it with or without food. If it upsets your stomach, take it with food. Do not take with grapefruit juice. Take your medicine at regular intervals. Do not take it more often than directed. Do not stop taking except on your doctor's advice.  Talk to your pediatrician regarding the use of this medicine in children. While this drug may be prescribed for children as young as 10 for selected conditions, precautions do apply.  Overdosage: If you think you have taken too much of this medicine contact a poison control center or emergency room at once.  NOTE: This medicine is only for you. Do not share this medicine with others.  What if I miss a dose?  If you miss a  dose, take it as soon as you can. If your next dose is to be taken in less than 12 hours, then do not take the missed dose. Take the next dose at your regular time. Do not take double or extra doses.  What may interact with this medicine?  Do not take this medicine with any of the following medications:  · dasabuvir; ombitasvir; paritaprevir; ritonavir  · ombitasvir; paritaprevir; ritonavir  · posaconazole  · red yeast rice  This medicine may also interact with the following medications:  · alcohol  · birth control pills  · certain antibiotics like erythromycin and clarithromycin  · certain antivirals for HIV or hepatitis  · certain medicines for cholesterol like fenofibrate, gemfibrozil, and niacin  · certain medicines for fungal infections like ketoconazole and itraconazole  · colchicine  · cyclosporine  · digoxin  · grapefruit juice  · rifampin  This list may not describe all possible interactions. Give your health care provider a list of all the medicines, herbs, non-prescription drugs, or dietary supplements you use. Also tell them if you smoke, drink alcohol, or use illegal drugs. Some items may interact with your medicine.  What should I watch for while using this medicine?  Visit your doctor or health care professional for regular check-ups. You may need regular tests to make sure your liver is working properly.  Your health care professional may tell you to stop taking this medicine if you develop muscle problems. If your muscle problems do not go away after stopping this medicine, contact your health care professional.  Do not become pregnant while taking this medicine. Women should inform their health care professional if they wish to become pregnant or think they might be pregnant. There is a potential for serious side effects to an unborn child. Talk to your health care professional or pharmacist for more information. Do not breast-feed an infant while taking this medicine.  This medicine may increase  blood sugar. Ask your healthcare provider if changes in diet or medicines are needed if you have diabetes.  If you are going to need surgery or other procedure, tell your doctor that you are using this medicine.  This drug is only part of a total heart-health program. Your doctor or a dietician can suggest a low-cholesterol and low-fat diet to help. Avoid alcohol and smoking, and keep a proper exercise schedule.  This medicine may cause a decrease in Co-Enzyme Q-10. You should make sure that you get enough Co-Enzyme Q-10 while you are taking this medicine. Discuss the foods you eat and the vitamins you take with your health care professional.  What side effects may I notice from receiving this medicine?  Side effects that you should report to your doctor or health care professional as soon as possible:  · allergic reactions like skin rash, itching or hives, swelling of the face, lips, or tongue  · fever  · joint pain  · loss of memory  · redness, blistering, peeling or loosening of the skin, including inside the mouth  · signs and symptoms of high blood sugar such as being more thirsty or hungry or having to urinate more than normal. You may also feel very tired or have blurry vision.  · signs and symptoms of liver injury like dark yellow or brown urine; general ill feeling or flu-like symptoms; light-belly pain; unusually weak or tired; yellowing of the eyes or skin  · signs and symptoms of muscle injury like dark urine; trouble passing urine or change in the amount of urine; unusually weak or tired; muscle pain or side or back pain  Side effects that usually do not require medical attention (report to your doctor or health care professional if they continue or are bothersome):  · diarrhea  · nausea  · stomach pain  · trouble sleeping  · upset stomach  This list may not describe all possible side effects. Call your doctor for medical advice about side effects. You may report side effects to FDA at  1-800-FDA-1088.  Where should I keep my medicine?  Keep out of the reach of children.  Store between 20 and 25 degrees C (68 and 77 degrees F). Throw away any unused medicine after the expiration date.  NOTE: This sheet is a summary. It may not cover all possible information. If you have questions about this medicine, talk to your doctor, pharmacist, or health care provider.  © 2020 [employer] (2019-10-09 11:36:16)

## 2021-03-30 NOTE — PROGRESS NOTES
2 RN Skin Check     2 RN skin check complete.   Devices in place: telemetry monitoring, PIV  Skin assessed under devices: yes.  Confirmed pressure ulcers found on: n/a.  New potential pressure ulcers noted on n/a. Wound consult placed No.  The following interventions in place Pillows and Barrier cream.        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 Pt has small scar located on his right knee, right shin. Pt's left eye partially closed. Pt's left index finger red and swollen. Otherwise,  skin is CDI.

## 2021-03-30 NOTE — DISCHARGE SUMMARY
Discharge Summary    CHIEF COMPLAINT ON ADMISSION  Chief Complaint   Patient presents with   • Eye Pain     L eye burning pain and unable to open eyelid since last Thursday.  Pt saw optometrist on March 9th and had injection in R eye and dilation to both eyes.  Pt reports burning after the dilation which improved but is still there.  Pt tried to get into optometrist today and is unable to be seen.   • Digit Pain     L index finger swelling/redness at first knuckle x 2 months.  Pt also had a fall approx 2 months ago which he may have injured his finger but his PCP also wanted r/t gout vs infection.       Reason for Admission  Eye Pain     Admission Date  3/29/2021    CODE STATUS  Full Code    HPI & HOSPITAL COURSE  This is a 60 y.o. male here with acute onset left eye ptosis.    60-year-old male with past medical history of Bell's palsy, diabetes, hypertension, and sleep apnea who presented 3/29/2021 for left eye ptosis starting last Thursday.  Patient denies any prior episodes and had associated erythema and dryness.  Patient did report seeing optometrist on March 9 with injection in the right eye and dilation to both eyes.  In the ER neurology was consulted and recommended MRI evaluation.  MRI showed no acute findings.  Metabolic work-up was positive for elevated LDL and hemoglobin A1c of 8. Discussed findings with the patient, he is to follow up with his primary care physician for further diabetes management.  CTA of the head and neck showed no acute findings.  Echo with bubble study was done that showed preserved EF and no evidence of shunt.  Patient's ptosis improved to where patient can partially see out of the eye, neurology recommended continued use of saline eyedrops and eye patching for comfort, also asa and statin.  They recommended outpatient follow-up in 2 to 4 weeks after discharge.  Patient's vital signs are stable and he is ready for discharge home.  He is to return to the ER if his condition  worsens.    Therefore, he is discharged in good and stable condition to home with close outpatient follow-up.      Discharge Date  3/30/2021    FOLLOW UP ITEMS POST DISCHARGE  Follow-up with primary care physician  Follow-up with neurology    DISCHARGE DIAGNOSES  Active Problems:    Ptosis, left eyelid POA: Unknown    DM2 (diabetes mellitus, type 2) ([facility]) POA: Unknown  Resolved Problems:    Hypokalemia POA: Unknown      FOLLOW UP  Future Appointments   Date Time Provider Department Center   4/14/2021 10:00 AM VASCULAR NURSE PRACTITIONER [facility] None     Owen Valles D.O.  [address]  [address]  [place] [address] [phone]    In 2 weeks  Follow up with primary care     [facility]  [address]  [place] [id number] [phone]  In 2 weeks  Follow up with neurology.      MEDICATIONS ON DISCHARGE     Medication List      CHANGE how you take these medications      Instructions   metFORMIN  MG Tb24  What changed: Another medication with the same name was removed. Continue taking this medication, and follow the directions you see here.  Commonly known as: GLUCOPHAGE XR   Take 500 mg by mouth 2 Times a Day.  Dose: 500 mg        CONTINUE taking these medications      Instructions   chlorthalidone 25 MG Tabs  Commonly known as: HYGROTON   Take 25 mg by mouth every day.  Dose: 25 mg     Glyxambi 25-5 MG Tabs  Generic drug: Empagliflozin-linaGLIPtin   Take 1 tablet by mouth every day.  Dose: 1 tablet     lactobacillus rhamnosus Caps capsule   Take 1 capsule by mouth every day.  Dose: 1 capsule     latanoprost 0.005 % Soln  Commonly known as: XALATAN   Place 1 Drop in both eyes every evening.  Dose: 1 Drop     telmisartan 80 MG Tabs  Commonly known as: MICARDIS   Take 40 mg by mouth every day.  Dose: 40 mg     Vitamin D (Cholecalciferol) 50 MCG (2000 UT) Caps   Take 2,000 Units by mouth every day.  Dose: 2,000 Units            Allergies  Allergies   Allergen Reactions   • Hctz  [Hydrochlorothiazide]      Rash on throat when exposed to excess sun       DIET  Orders Placed This Encounter   Procedures   • Diet Order Diet: Consistent CHO (Diabetic)     Standing Status:   Standing     Number of Occurrences:   1     Order Specific Question:   Diet:     Answer:   Consistent CHO (Diabetic) [4]       ACTIVITY  As tolerated.  Weight bearing as tolerated    CONSULTATIONS  Neurology    PROCEDURES  N/A    LABORATORY  Lab Results   Component Value Date    SODIUM 136 03/30/2021    POTASSIUM 3.3 (L) 03/30/2021    CHLORIDE 96 03/30/2021    CO2 23 03/30/2021    GLUCOSE 100 (H) 03/30/2021    BUN 15 03/30/2021    CREATININE 0.83 03/30/2021    CREATININE 0.9 06/18/2008        Lab Results   Component Value Date    WBC 7.4 03/30/2021    HEMOGLOBIN 16.5 03/30/2021    HEMATOCRIT 50.8 03/30/2021    PLATELETCT 254 03/30/2021        Total time of the discharge process exceeds 36 minutes.

## 2021-03-30 NOTE — PROGRESS NOTES
Chief Complaint   Patient presents with   • Eye Pain     L eye burning pain and unable to open eyelid since last Thursday.  Pt saw optometrist on March 9th and had injection in R eye and dilation to both eyes.  Pt reports burning after the dilation which improved but is still there.  Pt tried to get into optometrist today and is unable to be seen.   • Digit Pain     L index finger swelling/redness at first knuckle x 2 months.  Pt also had a fall approx 2 months ago which he may have injured his finger but his PCP also wanted r/t gout vs infection.       Problem List Items Addressed This Visit     Hypokalemia     Replace, recheck in AM.  Add on mag levels.           Other Visit Diagnoses     Diplopia        Ptosis of left eyelid        Gaze palsy            Neurology Progress Note     History of present illness:  This is a 60-year old male with PMHx significant for type II Diabetes mellitus, associated retinopathy, glaucoma, and hypertension who presented to [facility] on 3/29/21 for a chief complaint of a 4-day history of Left eye ptosis. The patient states that he felt to be in his usual state of health until 3/25/21; he reports that during the day, he noted progressive difficulty keeping his Left eye open. He admits to associated Left sided headache and progressively worsening doubled vision and Left eye injection. He reports that he called his eye specialist on Friday 3/26/21, however was unable to be seen. Symptoms persisted over the following two days; this morning, he reports that he again spoke with ?his optometrist; this time, was referred to ED.  Here, SBP 150s; BG 130s. Stat CTA head/neck, MRI Brain and orbits are pending.   Currently, patient is sitting up in bed; awake and alert. States that he feels well despite Left eye ptosis and associated double vision. He denies headache, associated dizziness, focal weakness, sensory deficit, paresthesia, problem with speech or swallowing.     Neurology has  been consulted by Dr. Maite Zamudio to further evaluate the findings noted above.     Interval, 3/30/21:  Patient ambulating around room, using bathroom, awake and alert. Admits to very mild improvement to Left eye ptosis/double vision this morning. Denies headache or eye pain. No events overnight.    Interestingly, MRI Brain revealed no acute intracranial abnormality including no acute ischemic infarction. MR Orbits with no periorbital process.     No changes to HPI as was previously documented.     Past medical history:   Past Medical History:   Diagnosis Date   • Bell's palsy    • Diabetes     diet controlled   • Hypertension    • Sleep apnea        Past surgical history:   Past Surgical History:   Procedure Laterality Date   • NASAL SEPTAL RECONST         Family history:   Family History   Problem Relation Age of Onset   • Clotting Disorder Neg Hx    • Stroke Neg Hx    • Heart Disease Neg Hx        Social history:   Social History     Socioeconomic History   • Marital status: Single     Spouse name: Not on file   • Number of children: Not on file   • Years of education: Not on file   • Highest education level: Not on file   Occupational History   • Not on file   Tobacco Use   • Smoking status: Never Smoker   • Smokeless tobacco: Never Used   Substance and Sexual Activity   • Alcohol use: Yes     Comment: occ   • Drug use: No   • Sexual activity: Not on file   Other Topics Concern   • Not on file   Social History Narrative   • Not on file     Social Determinants of Health     Financial Resource Strain:    • Difficulty of Paying Living Expenses:    Food Insecurity:    • Worried About Running Out of Food in the Last Year:    • Ran Out of Food in the Last Year:    Transportation Needs:    • Lack of Transportation (Medical):    • Lack of Transportation (Non-Medical):    Physical Activity:    • Days of Exercise per Week:    • Minutes of Exercise per Session:    Stress:    • Feeling of Stress :    Social Connections:     • Frequency of Communication with Friends and Family:    • Frequency of Social Gatherings with Friends and Family:    • Attends Holiness Services:    • Active Member of Clubs or Organizations:    • Attends Club or Organization Meetings:    • Marital Status:    Intimate Partner Violence:    • Fear of Current or Ex-Partner:    • Emotionally Abused:    • Physically Abused:    • Sexually Abused:        Current medications:   Current Facility-Administered Medications   Medication Dose   • LORazepam (ATIVAN) injection 1 mg  1 mg   • senna-docusate (PERICOLACE or SENOKOT S) 8.6-50 MG per tablet 2 tablet  2 tablet    And   • polyethylene glycol/lytes (MIRALAX) PACKET 1 Packet  1 Packet    And   • magnesium hydroxide (MILK OF MAGNESIA) suspension 30 mL  30 mL    And   • bisacodyl (DULCOLAX) suppository 10 mg  10 mg   • acetaminophen (Tylenol) tablet 650 mg  650 mg   • insulin lispro (HumaLOG) injection  1-6 Units    And   • glucose 4 g chewable tablet 16 g  16 g    And   • dextrose 50% (D50W) injection 50 mL  50 mL   • latanoprost (XALATAN) 0.005 % ophthalmic solution 1 Drop  1 Drop   • telmisartan (MICARDIS) tablet 40 mg  40 mg   • artificial tears (EYE LUBRICANT) ophth ointment 1 Application  1 Application   • carboxymethylcellulose (REFRESH TEARS) 0.5 % ophthalmic drops 1 Drop  1 Drop       Medication Allergy:  Allergies   Allergen Reactions   • Hctz [Hydrochlorothiazide]      Rash on throat when exposed to excess sun       Review of systems:   Constitutional: denies fever, night sweats, weight loss.   Eyes: As noted above.   Ears, Nose, Mouth, Throat: denies nasal secretion, nasal bleeding, difficulty swallowing, hearing loss, tinnitus, vertigo, ear pain, acute dental problems, oral ulcers or lesions.   Endocrine: denies recent weight changes, heat or cold intolerance, polyuria, polydypsia, polyphagia,abnormal hair growth.  Cardiovascular: denies new onset of chest pain, palpitations, syncope, or dyspnea of  exertion.  Pulmonary: denies shortness of breath, new onset of cough, hemoptysis, wheezing, chest pain or flu-like symptoms.   GI: denies nausea, vomiting, diarrhea, GI bleeding, change in appetite, abdominal pain, and change in bowel habits.  : denies dysuria, urinary incontinence, hematuria.  Heme/oncology: denies history of easy bruising or bleeding. No history of cancer, DVTor PE.  Allergy/immunology: denies hives/urticaria, or itching.   Dermatologic: denies new rash, or new skin lesions.  Musculoskeletal:denies joint swelling or pain, muscle pain, neck and back pain.   Neurologic: As noted above.   Psychiatric: denies symptoms of depression, anxiety, hallucinations, mood swings or changes, suicidal or homicidal thoughts.       Physical examination:   Vitals:    03/29/21 2000 03/30/21 0000 03/30/21 0400 03/30/21 0728   BP: 128/90 132/82 118/84 124/87   Pulse: 97 94 88 87   Resp: 17 16 16 16   Temp: 36.7 °C (98.1 °F) 36.9 °C (98.5 °F) 36.9 °C (98.5 °F) 36.8 °C (98.2 °F)   TempSrc: Temporal Temporal Temporal Temporal   SpO2: 97% 98% 96% 96%   Weight: 80 kg (176 lb 5.9 oz)      Height:         General: Patient in no acute distress, pleasant and cooperative.  HEENT: Normocephalic, no signs of acute trauma.   Neck: supple, no meningeal signs or carotid bruits. There is normal range of motion. No tenderness on exam.   Chest: clear to auscultation. No cough.   CV: RRR, no murmurs.   Skin: no signs of acute rashes or trauma.   Musculoskeletal: joints exhibit full range of motion, without any pain to palpation. There are no signs of joint or muscle swelling. There is no tenderness to deep palpation of muscles.   Psychiatric: No hallucinatory behavior. Denies symptoms of depression or suicidal ideation. Mood and affect appear normal on exam.     NEUROLOGICAL EXAM:   Mental status, orientation: Awake, alert and fully oriented.   Speech and language: speech is clear and fluent. The patient is able to name, repeat and  comprehend.   Memory: There is intact recollection of recent and remote events.   Cranial nerve exam: Pupils are 3-4 mm bilaterally and equally reactive to light.  Visual fields are intact by confrontation. There is no nystagmus on primary or secondary gaze. Dense CN III palsy appreciated on the Left with associated ptosis; no obvious pupillary defect. Face appears symmetric. Sensation in the face is intact to light touch. Uvula is midline. Palate elevates symmetrically. Tongue is midline and without any signs of tongue biting or fasciculations. Sternocleidomastoid muscles exhibit is normal strength bilaterally. Shoulder shrug is intact bilaterally.   Motor exam: Strength is 5/5 in all extremities. Tone is normal. No abnormal movements were seen on exam.   Sensory exam reveals normal sense of light touch and pinprick in all extremities.   Deep tendon reflexes:  2+ throughout. Plantar responses are flexor. There is no clonus.   Coordination: shows a normal finger-nose-finger and heel shin; no ataxia/dysmetria. Normal rapidly alternating movements.   Gait: Not assessed at this time as patient is a fall risk.     No significant changes to exam as was documented on 3/29/21.       ANCILLARY DATA REVIEWED:     Lab Data Review:  Recent Results (from the past 24 hour(s))   CBC WITH DIFFERENTIAL    Collection Time: 03/29/21  2:15 PM   Result Value Ref Range    WBC 8.4 4.8 - 10.8 K/uL    RBC 6.24 (H) 4.70 - 6.10 M/uL    Hemoglobin 17.4 14.0 - 18.0 g/dL    Hematocrit 52.8 (H) 42.0 - 52.0 %    MCV 84.6 81.4 - 97.8 fL    MCH 27.9 27.0 - 33.0 pg    MCHC 33.0 (L) 33.7 - 35.3 g/dL    RDW 43.6 35.9 - 50.0 fL    Platelet Count 251 164 - 446 K/uL    MPV 10.4 9.0 - 12.9 fL    Neutrophils-Polys 68.90 44.00 - 72.00 %    Lymphocytes 18.20 (L) 22.00 - 41.00 %    Monocytes 8.40 0.00 - 13.40 %    Eosinophils 2.90 0.00 - 6.90 %    Basophils 1.20 0.00 - 1.80 %    Immature Granulocytes 0.40 0.00 - 0.90 %    Nucleated RBC 0.00 /100 WBC     Neutrophils (Absolute) 5.77 1.82 - 7.42 K/uL    Lymphs (Absolute) 1.52 1.00 - 4.80 K/uL    Monos (Absolute) 0.70 0.00 - 0.85 K/uL    Eos (Absolute) 0.24 0.00 - 0.51 K/uL    Baso (Absolute) 0.10 0.00 - 0.12 K/uL    Immature Granulocytes (abs) 0.03 0.00 - 0.11 K/uL    NRBC (Absolute) 0.00 K/uL   BASIC METABOLIC PANEL    Collection Time: 21  2:15 PM   Result Value Ref Range    Sodium 135 135 - 145 mmol/L    Potassium 2.9 (L) 3.6 - 5.5 mmol/L    Chloride 89 (L) 96 - 112 mmol/L    Co2 24 20 - 33 mmol/L    Glucose 136 (H) 65 - 99 mg/dL    Bun 19 8 - 22 mg/dL    Creatinine 0.80 0.50 - 1.40 mg/dL    Calcium 10.2 8.5 - 10.5 mg/dL    Anion Gap 22.0 (H) 7.0 - 16.0   PROTHROMBIN TIME    Collection Time: 21  2:15 PM   Result Value Ref Range    PT 14.1 12.0 - 14.6 sec    INR 1.06 0.87 - 1.13   ESTIMATED GFR    Collection Time: 21  2:15 PM   Result Value Ref Range    GFR If African American >60 >60 mL/min/1.73 m 2    GFR If Non African American >60 >60 mL/min/1.73 m 2   Sed Rate    Collection Time: 21  2:15 PM   Result Value Ref Range    Sed Rate Westergren 3 0 - 20 mm/hour   MAGNESIUM    Collection Time: 21  2:15 PM   Result Value Ref Range    Magnesium 2.0 1.5 - 2.5 mg/dL   SARS-CoV-2 PCR (24 hour In-House): Collect NP swab in VTM    Collection Time: 21  2:55 PM    Specimen: Respirate   Result Value Ref Range    SARS-CoV-2 Source NP Swab     SARS-CoV-2 by PCR NotDetected    EKG    Collection Time: 21  6:38 PM   Result Value Ref Range    Report       [facility]    Test Date:  2021  Pt Name:    JEAN CARLOS MIKE             Department: ER  MRN:        [id number]                      Room:       [facility]  Gender:     Male                         Technician: PEP  :        1960                   Requested By:AMARILYS CORTEZ  Order #:    [id number]                    Reading MD: Karen Blanco MD    Measurements  Intervals                                Axis  Rate:       101                           P:          37  NE:         172                          QRS:        54  QRSD:       84                           T:          -43  QT:         376  QTc:        488    Interpretive Statements  SINUS TACHYCARDIA  NONSPECIFIC T ABNORMALITIES, INFERIOR LEADS  BORDERLINE PROLONGED QT INTERVAL  BASELINE WANDER IN LEAD(S) V6  Compared to ECG 04/21/2019 14:21:58  No significant change noted  Electronically Signed On 3- 21:47:57 PDT by Karen Blanco MD     ACCU-CHEK GLUCOSE    Collection Time: 03/29/21  8:47 PM   Result Value Ref Range    Glucose - Accu-Ck 180 (H) 65 - 99 mg/dL   Basic Metabolic Panel (BMP)    Collection Time: 03/30/21  4:46 AM   Result Value Ref Range    Sodium 136 135 - 145 mmol/L    Potassium 3.3 (L) 3.6 - 5.5 mmol/L    Chloride 96 96 - 112 mmol/L    Co2 23 20 - 33 mmol/L    Glucose 100 (H) 65 - 99 mg/dL    Bun 15 8 - 22 mg/dL    Creatinine 0.83 0.50 - 1.40 mg/dL    Calcium 9.4 8.5 - 10.5 mg/dL    Anion Gap 17.0 (H) 7.0 - 16.0   CBC without Differential    Collection Time: 03/30/21  4:46 AM   Result Value Ref Range    WBC 7.4 4.8 - 10.8 K/uL    RBC 6.00 4.70 - 6.10 M/uL    Hemoglobin 16.5 14.0 - 18.0 g/dL    Hematocrit 50.8 42.0 - 52.0 %    MCV 84.7 81.4 - 97.8 fL    MCH 27.5 27.0 - 33.0 pg    MCHC 32.5 (L) 33.7 - 35.3 g/dL    RDW 43.8 35.9 - 50.0 fL    Platelet Count 254 164 - 446 K/uL    MPV 10.9 9.0 - 12.9 fL   Lipid Profile (Lipid Panel) Fasting    Collection Time: 03/30/21  4:46 AM   Result Value Ref Range    Cholesterol,Tot 180 100 - 199 mg/dL    Triglycerides 210 (H) 0 - 149 mg/dL    HDL 32 (A) >=40 mg/dL     (H) <100 mg/dL   ESTIMATED GFR    Collection Time: 03/30/21  4:46 AM   Result Value Ref Range    GFR If African American >60 >60 mL/min/1.73 m 2    GFR If Non African American >60 >60 mL/min/1.73 m 2   ACCU-CHEK GLUCOSE    Collection Time: 03/30/21  5:30 AM   Result Value Ref Range    Glucose - Accu-Ck 94 65 - 99 mg/dL       Labs reviewed by me.       Imaging reviewed  by me:     MR-ORBITS, FACE, NECK-W/O   Final Result      1.  Limited orbit study   2.  No significant abnormality identified.      MR-BRAIN-W/O   Final Result      1.  No acute abnormality.   2.  Unremarkable noncontrast MR examination of the brain.      CT-CTA HEAD WITH & W/O-POST PROCESS   Final Result      No large vessel occlusion, hemodynamically significant stenosis or aneurysm.      CT-CTA NECK WITH & W/O-POST PROCESSING   Final Result      CT angiogram of the neck within normal limits.      DX-FINGER(S) 2+ LEFT   Final Result      Mild soft tissue swelling overlying the dorsum of the distal left second digit without underlying acute osseous abnormality.      EC-ECHOCARDIOGRAM COMPLETE W/ CONT    (Results Pending)       Modified Schofield Scale (MRS): 0 = No symptoms      ASSESSMENT AND PLAN:  60-year old male with PMHx significant for type II Diabetes mellitus, associated retinopathy, glaucoma, and hypertension who presented to [facility] on 3/29/21 for a chief complaint of a 4-day history of Left eye ptosis; here, noted to have dense Left CN III palsy and diplopia with no other appreciable neurological deficits (negative Chance's, PERRL) . Not a candidate for IV tPA secondary to presentation time greater than 4.5 hours from time LKW. Interestingly, MRI Brain and Orbits with no acute intracranial nor periorbital abnormality, respectively.     Differential diagnoses still include ischemic neuropathy as is seen with chronic type II Diabetes mellitus (most likely); have exceedingly low suspicion for an infectious process such as Fuentes Hunt syndrome (given exclusive CN III involvement, no dermal lesions, and no facial palsy).     Lastly, could consider diagnosis such as Chester-Hunt syndrome; note, this syndrome is a diagnosis of exclusion and thus remains lower on the differential. If symptoms resolve and then recur on contralateral side, this would support this diagnosis (and, would then recommend tx with  corticosteroids).      Recommendations/Plan:     -q4h and PRN neuro assessment. VS per nursing/unit protocol. BP goal < 140/90 (No permissive HTN as patient is > 4 days from onset of symptoms). Antihypertensives per primary team.   -Telemetry; currently SR. Screen for Afib/arrhythmia. Obtain TTE with bubble study-- still pending.   -Continue ASA 81 mg PO q day and Atorvastatin 40 mg PO q HS- primary stroke prevention measures given history of type II DM. Note LDL is 106, goal < 70.   -Recommend aggressive BG management per primary team. Avoid IVF with Dextrose. BG goal 140-180. Check hemoglobin A1c-- still pending.   -Note ESR, CRP WNL.   -Counseled patient at length regarding life style and risk factor modification for primary stroke prevention.   -As was discussed with the patient, recommend continued PRN use of saline eye drops, intermittent eye patching for comfort.   -All other medical management per primary team. Outpatient neurology referral for follow up 2-4 weeks after discharge has been placed.   -DVT PPX: SCDs.      The plan of care above has been discussed with Dr. Jimenez. Will follow up with results of TTE and Hemoglobin A1c; Otherwise, no further recommendations from a neurological perspective; please call with questions.     ANT Mueller.P.R.N.  [employer]      **Addendum 3/30/21 1110: Note Hemoglobin A1c is 8.0; consider diabetes educator consultation for further teaching/resources prior to discharge.

## 2021-03-30 NOTE — CARE PLAN
Problem: Safety  Goal: Will remain free from injury  Outcome: PROGRESSING AS EXPECTED  Goal: Will remain free from falls  Outcome: PROGRESSING AS EXPECTED   Patient educated on fall risk and to call for assistance.     Problem: Pain Management  Goal: Pain level will decrease to patient's comfort goal  Outcome: PROGRESSING AS EXPECTED   Patient complaining of burning to bilateral eyes due to dryness.

## 2021-03-30 NOTE — PROGRESS NOTES
Received bedside report and assumed care of patient. Patient is A&Ox4 and awake in bed. Patient showing no signs of distress, complaints of burning bilateral eye pain, and is on RA. Tele monitor in place. All fall precautions are in place. Call light within reach, bed in low position, 2 side rails up, and belongings are within reach.  Patient was updated on Plan of Care, no questions or concerns. Pt educated on use of call light for assistance. Will continue to monitor.

## 2021-03-30 NOTE — H&P
Hospital Medicine History & Physical Note    Date of Service  3/29/2021    Primary Care Physician  Owen Valles D.O.    Consultants  Neurology    Code Status  Full Code    Chief Complaint  Chief Complaint   Patient presents with   • Eye Pain     L eye burning pain and unable to open eyelid since last Thursday.  Pt saw optometrist on March 9th and had injection in R eye and dilation to both eyes.  Pt reports burning after the dilation which improved but is still there.  Pt tried to get into optometrist today and is unable to be seen.   • Digit Pain     L index finger swelling/redness at first knuckle x 2 months.  Pt also had a fall approx 2 months ago which he may have injured his finger but his PCP also wanted r/t gout vs infection.       History of Presenting Illness  60 y.o. male who presented 3/29/2021 with acute onset of left eye ptosis which occurred last Thursday.  It has perhaps gotten slightly better since then though remains a fairly significant issue.  He has secondary redness and dryness to the conjunctivitis secondary to this issue.  No priors.  Location is ocular, acuity is acute, course is improving, associated factors include keratoconjunctivitis sicca, severity is moderate.    Review of Systems  All systems reviewed and negative except as noted per above.    Past Medical History   has a past medical history of Bell's palsy, Diabetes, Hypertension, and Sleep apnea.    Surgical History   has a past surgical history that includes nasal septal reconst.     Family History  Grandfather with diabetes otherwise noncontributory.    Social History   reports that he has never smoked. He has never used smokeless tobacco. He reports current alcohol use. He reports that he does not use drugs.    Allergies  Allergies   Allergen Reactions   • Hctz [Hydrochlorothiazide]      Rash on throat when exposed to excess sun       Medications  Prior to Admission Medications   Prescriptions Last Dose Informant Patient  Reported? Taking?   Empagliflozin-linaGLIPtin (GLYXAMBI) 25-5 MG Tab 3/29/2021 at 0730 Patient Yes No   Sig: Take 1 tablet by mouth every day.   Vitamin D, Cholecalciferol, 50 MCG (2000 UT) Cap 3/29/2021 at 0730 Patient Yes Yes   Sig: Take 2,000 Units by mouth every day.   chlorthalidone (HYGROTON) 25 MG Tab 3/29/2021 at 0730 Patient Yes No   Sig: Take 25 mg by mouth every day.   lactobacillus rhamnosus (CULTURELLE) Cap capsule 3/29/2021 at 0730 Patient Yes Yes   Sig: Take 1 capsule by mouth every day.   latanoprost (XALATAN) 0.005 % Solution 3/28/2021 at 2100 Patient Yes No   Sig: Place 1 Drop in both eyes every evening.   metFORMIN (GLUCOPHAGE) 500 MG Tab Not Taking at Unknown time Patient No No   Sig: Take 1 Tab by mouth 4 times a day.   Patient not taking: Reported on 3/29/2021   metFORMIN ER (GLUCOPHAGE XR) 500 MG TABLET SR 24 HR 3/29/2021 at 0730 Patient Yes No   Sig: Take 500 mg by mouth 2 Times a Day.   telmisartan (MICARDIS) 80 MG Tab 3/29/2021 at 0730 Patient Yes No   Sig: Take 40 mg by mouth every day.      Facility-Administered Medications: None       Physical Exam  Temp:  [36.3 °C (97.4 °F)] 36.3 °C (97.4 °F)  Pulse:  [103-108] 103  Resp:  [14] 14  BP: (131-163)/(88-98) 142/88  SpO2:  [95 %-97 %] 97 %    General: No acute distress  HEENT atraumatic, normocephalic, ptosis to the left eye noted.  Neck: No JVD  Chest: Respirations are unlabored  Cardiac: Physiologic S1 and S2  Abdomen: Soft, nontender, nondistended  Extremities: Without clubbing, cyanosis or edema  Neuro: Cranial nerves II through XII are grossly intact.  Psych: No anxiety, judgement intact.          Laboratory:  Recent Labs     03/29/21  1415   WBC 8.4   RBC 6.24*   HEMOGLOBIN 17.4   HEMATOCRIT 52.8*   MCV 84.6   MCH 27.9   MCHC 33.0*   RDW 43.6   PLATELETCT 251   MPV 10.4     Recent Labs     03/29/21  1415   SODIUM 135   POTASSIUM 2.9*   CHLORIDE 89*   CO2 24   GLUCOSE 136*   BUN 19   CREATININE 0.80   CALCIUM 10.2     Recent Labs      03/29/21  1415   GLUCOSE 136*     Recent Labs     03/29/21  1415   INR 1.06     No results for input(s): NTPROBNP in the last 72 hours.      No results for input(s): TROPONINT in the last 72 hours.    Imaging:  CT-CTA HEAD WITH & W/O-POST PROCESS   Final Result      No large vessel occlusion, hemodynamically significant stenosis or aneurysm.      CT-CTA NECK WITH & W/O-POST PROCESSING   Final Result      CT angiogram of the neck within normal limits.      DX-FINGER(S) 2+ LEFT   Final Result      Mild soft tissue swelling overlying the dorsum of the distal left second digit without underlying acute osseous abnormality.      MR-ORBITS,FACE,NECK-WITH&W/O & SEQUENCES    (Results Pending)   MR-BRAIN-WITH & W/O    (Results Pending)         Assessment/Plan:  I anticipate this patient is appropriate for observation status at this time.    Ptosis, left eyelid  Assessment & Plan  Case is already been reviewed with neurology, they are recommending evaluation with MRI and a stroke work-up.  Patient will be placed on observation status for MRI, I will check a lipid panel in the morning, telemetry monitoring overnight, hold on remainder of stroke work-up (echocardiogram, carotid ultrasound, PT, OT, ST, IPR Cx) and pursue these additional work-ups if MRI is positive.  On the whole this may be more consistent with a recurrent Bell's palsy, still evolving.  Deferred to neurology.    Hypokalemia  Assessment & Plan  Replace, recheck in AM.  Add on mag levels.    DM2 (diabetes mellitus, type 2) ([facility])  Assessment & Plan  Home dose Metformin will be held, managed with sliding scale insulin.  Basal and prandial insulins will be held at the advice of pharmacy.  These can be initiated on hospital day 1 if indicated.

## 2021-03-31 NOTE — PROGRESS NOTES
Patient A&Ox4, on room air, patient discharged with friend per order to home self care. PIV removed, all LDAs completed, tele box removed, monitors notified. Patient vocalized understanding on all discharge instructions, follow up appointments, and medications. Patient discharged.

## 2021-04-05 ENCOUNTER — TELEPHONE (OUTPATIENT)
Dept: VASCULAR LAB | Facility: MEDICAL CENTER | Age: 61
End: 2021-04-05

## 2021-04-05 ENCOUNTER — APPOINTMENT (OUTPATIENT)
Dept: VASCULAR LAB | Facility: MEDICAL CENTER | Age: 61
End: 2021-04-05
Attending: INTERNAL MEDICINE
Payer: MEDICAID

## 2021-04-05 DIAGNOSIS — E78.5 DYSLIPIDEMIA: ICD-10-CM

## 2021-04-05 NOTE — TELEPHONE ENCOUNTER
Spoke with the pt regarding his last visit to the hospital. He was told to stop the Telmisartan and Chlorthalidone but his BP was HR was elevated. He started on Atorvastatin 40 mg Because they did not know if he had a stroke and has had muscle pain in his arms and his diaphragm is not working right. He is having trouble breathing. CK ordered. Instructed to stop the Atorvastatin for 2 weeks I will see him in the office in 8 days or so. He will restart the Telmisartin and hold the Chlorthalidone for now.YULIYA Avalos.

## 2021-04-08 ENCOUNTER — TELEPHONE (OUTPATIENT)
Dept: VASCULAR LAB | Facility: MEDICAL CENTER | Age: 61
End: 2021-04-08

## 2021-04-08 NOTE — TELEPHONE ENCOUNTER
Spoke with the pt about his labs. He had them done at [facility]. Fax request to [facility]. Pt stated that he is feeling better since off the Atorvastatin. His BP was running to low so he cut the Telmisartan to 20 mg dose. YULIYA Avalos.

## 2021-04-08 NOTE — TELEPHONE ENCOUNTER
Ask pt to call back to let me know if he is feeling better once off the atorvastatin and to see if he is going to be able to do the CK that I ordered. YULIYA Avalos.

## 2021-04-14 ENCOUNTER — OFFICE VISIT (OUTPATIENT)
Dept: VASCULAR LAB | Facility: MEDICAL CENTER | Age: 61
End: 2021-04-14
Attending: NURSE PRACTITIONER
Payer: MEDICAID

## 2021-04-14 DIAGNOSIS — I1A.0 RESISTANT HYPERTENSION: ICD-10-CM

## 2021-04-14 DIAGNOSIS — E78.5 DYSLIPIDEMIA: ICD-10-CM

## 2021-04-14 DIAGNOSIS — E11.65 UNCONTROLLED TYPE 2 DIABETES MELLITUS WITH HYPERGLYCEMIA (HCC): ICD-10-CM

## 2021-04-14 DIAGNOSIS — G47.33 OSA (OBSTRUCTIVE SLEEP APNEA): ICD-10-CM

## 2021-04-14 DIAGNOSIS — E87.6 HYPOKALEMIA: ICD-10-CM

## 2021-04-14 PROCEDURE — 99213 OFFICE O/P EST LOW 20 MIN: CPT | Mod: 95,CR | Performed by: NURSE PRACTITIONER

## 2021-04-14 RX ORDER — ASPIRIN 81 MG/1
81 TABLET, CHEWABLE ORAL DAILY
Qty: 100 TABLET | Refills: 1 | Status: SHIPPED | OUTPATIENT
Start: 2021-04-14 | End: 2021-04-14 | Stop reason: SDUPTHER

## 2021-04-14 RX ORDER — ASPIRIN 81 MG/1
81 TABLET, CHEWABLE ORAL DAILY
Qty: 100 TABLET | Refills: 1 | Status: SHIPPED | OUTPATIENT
Start: 2021-04-14

## 2021-04-14 RX ORDER — TELMISARTAN 20 MG/1
20 TABLET ORAL DAILY
Qty: 30 TABLET | Refills: 5 | Status: SHIPPED | OUTPATIENT
Start: 2021-04-14 | End: 2021-04-14 | Stop reason: SDUPTHER

## 2021-04-14 RX ORDER — POTASSIUM CHLORIDE 20 MEQ/1
20 TABLET, EXTENDED RELEASE ORAL DAILY
Qty: 14 TABLET | Refills: 0 | Status: SHIPPED | OUTPATIENT
Start: 2021-04-14 | End: 2023-08-23

## 2021-04-14 RX ORDER — TELMISARTAN 20 MG/1
20 TABLET ORAL DAILY
Qty: 30 TABLET | Refills: 5 | Status: SHIPPED | OUTPATIENT
Start: 2021-04-14 | End: 2021-05-27

## 2021-04-14 ASSESSMENT — ENCOUNTER SYMPTOMS
EYE DISCHARGE: 0
HEADACHES: 0
CHILLS: 0
BRUISES/BLEEDS EASILY: 0
FOCAL WEAKNESS: 0
PALPITATIONS: 0
TREMORS: 0
SEIZURES: 0
DOUBLE VISION: 0
DEPRESSION: 0
FEVER: 0
BACK PAIN: 0
WEAKNESS: 0
NERVOUS/ANXIOUS: 1
MYALGIAS: 0
BLOOD IN STOOL: 0
INSOMNIA: 0
SHORTNESS OF BREATH: 0
BLURRED VISION: 1
DIZZINESS: 0

## 2021-04-14 NOTE — PROGRESS NOTES
This visit was conducted via Zoom video call using secure and encrypted video conferencing technology due to covid restrictions.   The patient was in a private location in the state [facility].    The patient's identity was confirmed and verbal consent was obtained for this virtual visit.    RESISTANT HTN CLINIC- FOLLOW-UP EVALUATION   04/14/21    Assessment / Plan:     This visit was conducted via Facetime video call using secure and encrypted video conferencing technology due to covid-19 restrictions.   The patient was in a private location in the state [facility].    The patient's identity was confirmed and verbal consent was obtained for this virtual visit.    1. Blood Pressure Control:  Qualifies as resistant HTN: yes - 3+ meds w/o control   Office BP Goal ACC/AHA (2017) goal <130/80  Home BP at goal:  Yes  could indicate slight over treatment   Office BP at goal:  Virtual visit    24h ABPM (12/2019): Reasonable data acquisition. Mean daytime: 117/85 consistent with suboptimally controlled out of office diastolic blood pressure. Nocturnal dip: Appears somewhat blunted  Echo: ordered, pending  ACR: A1  Device candidate? no   Plan:   Monitoring:   - continue home BP monitoring, reviewed correct technique:  Yes   - monitor lytes/gfr routinely   - advised that stabilizing BP may require multiple med changes and close monitoring over the next several months, reviewed that initially there will be additional imaging and labs and the possibility of adverse drug rxn's and variability of his BP and HR until we have things stabilized.  Advised to contact our office as needed for questions or concerns.      2. Work up of Secondary Causes of Resistant Hypertension:   Renovascular HTN: Excluded  Primary Aldosteronism: Excluded  Thyroid Function: Excluded  Obstructive Sleep Apnea: on CPAP, working on mask fitting for adherence   Pheochromocytoma: Excluded   Cushing's:   Excluded   Other:   1) anxiety - still under counseling  at [facility], co-morbid with eye conditions, recent job stress    Recommendations At This Visit: none         3. Medication Use / Adherence:  Assessment: Complete  Recommendations: Instructed to Continue Taking All Medications As Prescribed         4. End Organ Damage:   Left Ventricular Hypertrophy: Absent on  Echocardiogram Date: 1/2020  Albuminuria: None on Date: pending   Renal Function: Chronic Kidney Disease  G2 at worst  Established Cardiovascular Disease: None    5. Lifestyle Recommendations:    Smoking: continued complete avoidance of all tobacco products     Physical Activity: continue healthy activity to improve CV fitness, see care instructions for additional details     Weight Management and Nutrition: Dietary plan was discussed with patient at this visit including DASH, low sodium and/or as outlined in care instructions     6. Standard HTN Pharmacotherapy:  ACEI/ARB: decrease telmisartan to 20mg.   Thiazide Type Diuretic: Continue to hold Chlorthalidone 25 mg for now  Due to low BP and hypokalemia     Calcium Channel Blocker (CCB):Taken off     7. Additional Agents:  Aldosterone Antagonist: add as 4th agent   Loop Diuretic: not indicated   Peripheral Alpha Blocker: add as 5th agent  Other CCB: not indicated   Direct Vasodilator: not indicated   Centrally Acting Alpha Agonist: Not using clonidine 0.1mg for BP spikes >160/>110 only,  Other: Diuretic--Off for now  Beta blocker : not indicated   DRI: not indicated      8. Other CV Risk Factors:     1) LIPID MANAGEMENT:  Guidlelines recommend at least mod-intensity statin  Currently on statin: No   Lp(a) normal   hsCRP 1.8 (avg risk)  LDL-P 1282, small LDL-P 956, high risk LDL-P size (LDL-P/LDL-C ratio discordant)  - focus on LDL-P reduction and apoB due to discordance to LDL-C   Direct LDL 65  NLA Risk Category:   Very high:  T2D with 2 or more RFs or evidence of end-org damage (CKD, ACR>29, retinopathy)  Tx threshold:  non-HDL-C >99, LDL-C >69  Tx goal:  non-HDL-C <100,  LDL-C <70  (optional: apoB <80)  Started on Atorvastatin 40 mg after hospitalization as they could not rule out stroke, had myalgias off for 1.5 weeks fells better   At goal: No    Plan:  - reinforced ongoing TLC measures   - update labs routinely   -Trial of Rosuvastatin 5 mg 3 days a week-to start after he see's neurologist    - continue vitamin D3 5,000 units indefinitely if statin initiated     2) GLYCEMIA MANAGEMENT:  Diabetic, T2, ophthalmic complications, non-insulin   Last A1c = 7.5 - per pt's report  Goal A1c < 7.5, optimal <7.0  ACR: A1  Plan:  - glyxambi 25/5 daily,   - defer overall mgmt to [facility] PCP  -metformin changed due to recall to 500 mg 4 times a day with each meal and at bedtime  - recommmend for routine care with PCP (or endocrine) to include regular A1c monitoring, annual albumin/creatinine ratio (ACR), annual diabetic retinopathy screening, foot exams, annual flu vaccine, and updates to pneumonia vaccines as appropriate     3) ANTIPLATELET/ANTICOAGULANT THERAPY: not indicated for now   - not currently indicated for now, hx of bleeding on ASA   - consider plavix     9. Other Issues:    1) DM retinopathy, proliferative and glaucoma that may require surgery      - continue care with ophthalmo for injections routinely and second opinion for surgery      2) BARBARA on CPAP, having trouble with tolerance, overall stable   - defer mgmt to sleep MD      3) vit D deficiency, low   - continue Vit D3 5,000 units daily   - update labs     4) hypokalemia: K dur 20 meq Daily for 2 weeks then BMP to follow      5) Lt eye palsy -was seen in ER for work up. Referred to Neurology to assess. Was not found to have a stroke in ER. Optometrists wants pt to not start on any other meds until after Neurologist appt. Referrals office to determine where he can go. He is on Medicaid and [facility] does not take any new medicaid pt's at this time. Message sent to referrals office to help with placement.  Optometrist wants to speak directly with Dr. Dover message sent to him.     Studies Ordered At This Visit:  Consider cardiac CT when able to afford   Blood Work to Be Obtained Prior to Next Visit:  GEOVANNA   Disposition: 4 weeks after Neuro appt.   Time: 20-29min - - chart review/prep, review of other providers' records, imaging/lab review, face-to-face time for history/examination, ordering, prescribing,  review of results/meds/ treatment plan with patient/family/caregiver, documentation in EMR, care coordination (as needed)  Diagnosis:  1. Resistant hypertension     2. Uncontrolled type 2 diabetes mellitus with hyperglycemia (HCC)     3. BARBARA (obstructive sleep apnea)     4. Dyslipidemia          History of Present Illness:   Marcello Perez is male seen for evaluation of resistant HTN and management. Marcello Perez is referred by: Owen Valles D.O..    No recent labs completed - pt reports completed 2020 but  [facility] and [facility] do not have.     HTN  Current HTN concerns: reports new onset of mild, intermittent left upper chest pain.  No radiation.  Gets intermittent numbness at ulnar aspect of left forearm/hand.   Intermittent HA temples.   Current ADRs: changed telmisartan to 1/2 tab BID and less dizz   HTN sx:  Ongoing blurred vision due to underlying DR.  No chest pain, shortness of breath, headache, nausea.  Occasional intermittent vertigo, Night time dizziness taking telmisartan 1/2 tab bid    Home BP lo-124/70-81  24h ABPM completed: not tested  Adherence to current HTN meds: compliant all of the time     Antiplatelet/anticoagulation:   No     Hyperlipidemia:    No prior statin, reports HDL has been good in the past. No prior ASCVD  Current treatment: TLC only  Myalgias? Not applicable  Other adverse drug reactions? Not applicable  Lipid profile: labs done as noted below     T2D:  No current issues.   Currently off Tresiba.   Continue care with Dr. Glenn Meek. No current symptoms  reported.   Tolerating meds and no recent med changes.   Home blood sugars stable, reports no lows.   Last A1c - per pt was 7.5%     Pertinent HTN hx (reviewed at initial visit):   Age at HTN dx: 2000  Past HTN medications: lisino, spirono/hctz  HTN crises:  No prior hospitalization or crises, mostly DM related    Lifestyle factors:     High salt: Yes, Details: regular, trying w/o salt.  no table salt, uses salt in cooking    EtOH: Yes, Details: social only  Interfering substances:     NSAIDs: No    Decongestants. No   Stimulants/drugs: No    Clinical evidence of ASCVD:     1) hx of MI/ACS:  no   2) coronary or other revascularization procedure: no   3) TIA/ischemic CVA: no   4) PAD (including BESSIE <0.9): no   5) documented (CAD, Renal athero, AA due to athero, carotid plaque >50% stenosis: no    Major RFs:     1) Age (Men>44, women>54):  yes   2) Fhx early CAD (<55 men, <65 women):  no   3) cigarette smoking:   reports that he has never smoked. He has never used smokeless tobacco.   4) high BP >139/89 or on tx: yes   5) Low HDL-C (<40 men, <50 women): yes , in past   HDL   Date Value Ref Range Status   04/22/2015 28 (L) >39 mg/dL Final     Other ASCVD risk factors:     CKD:  No   Sleeping disorder/BARBARA: Yes, Details: on CPAP   Hypothyroidism: No      Social History:     Social History     Socioeconomic History   • Marital status: Single     Spouse name: Not on file   • Number of children: Not on file   • Years of education: Not on file   • Highest education level: Not on file   Occupational History   • Not on file   Tobacco Use   • Smoking status: Never Smoker   • Smokeless tobacco: Never Used   Substance and Sexual Activity   • Alcohol use: Yes     Comment: occ   • Drug use: No   • Sexual activity: Not on file   Other Topics Concern   • Not on file   Social History Narrative   • Not on file     Social Determinants of Health     Financial Resource Strain:    • Difficulty of Paying Living Expenses:    Food Insecurity:     • Worried About Running Out of Food in the Last Year:    • Ran Out of Food in the Last Year:    Transportation Needs:    • Lack of Transportation (Medical):    • Lack of Transportation (Non-Medical):    Physical Activity:    • Days of Exercise per Week:    • Minutes of Exercise per Session:    Stress:    • Feeling of Stress :    Social Connections:    • Frequency of Communication with Friends and Family:    • Frequency of Social Gatherings with Friends and Family:    • Attends [employer] Services:    • Active Member of Clubs or Organizations:    • Attends Club or Organization Meetings:    • Marital Status:    Intimate Partner Violence:    • Fear of Current or Ex-Partner:    • Emotionally Abused:    • Physically Abused:    • Sexually Abused:         Review of Systems:   Review of Systems   Constitutional: Negative for chills, fever and malaise/fatigue.   HENT: Negative for nosebleeds.    Eyes: Positive for blurred vision (baseline ). Negative for double vision and discharge.   Respiratory: Negative for shortness of breath.    Cardiovascular: Negative for chest pain, palpitations and leg swelling.   Gastrointestinal: Negative for blood in stool and melena.   Genitourinary: Negative for hematuria.   Musculoskeletal: Negative for back pain (chronic ), joint pain and myalgias.   Skin: Negative for itching and rash.   Neurological: Negative for dizziness, tremors, focal weakness, seizures, weakness and headaches.   Endo/Heme/Allergies: Does not bruise/bleed easily.   Psychiatric/Behavioral: Negative for depression. The patient is nervous/anxious (basline). The patient does not have insomnia.         Objective:   Allergies:  Hctz [hydrochlorothiazide]    There were no vitals filed for this visit.  There is no height or weight on file to calculate BMI.    BP:   BP Readings from Last 5 Encounters:   03/30/21 126/80   10/05/20 111/72   04/02/20 119/81   01/02/20 131/92   11/25/19 155/91        Outpatient Encounter  Medications as of 4/14/2021   Medication Sig Dispense Refill   • atorvastatin (LIPITOR) 40 MG Tab Take 1 tablet by mouth every bedtime. 30 tablet 0   • aspirin (ASA) 81 MG Chew Tab chewable tablet Chew 1 tablet every day. 30 tablet 0   • metFORMIN ER (GLUCOPHAGE XR) 500 MG TABLET SR 24 HR Take 500 mg by mouth 2 Times a Day.     • telmisartan (MICARDIS) 80 MG Tab Take 40 mg by mouth every day.     • Vitamin D, Cholecalciferol, 50 MCG (2000 UT) Cap Take 2,000 Units by mouth every day.     • lactobacillus rhamnosus (CULTURELLE) Cap capsule Take 1 capsule by mouth every day.     • chlorthalidone (HYGROTON) 25 MG Tab Take 25 mg by mouth every day.     • latanoprost (XALATAN) 0.005 % Solution Place 1 Drop in both eyes every evening.     • Empagliflozin-linaGLIPtin (GLYXAMBI) 25-5 MG Tab Take 1 tablet by mouth every day.       No facility-administered encounter medications on file as of 4/14/2021.     Physical Exam  Constitutional:       Appearance: Normal appearance.   Eyes:      Comments: Lt eye deviates   Cardiovascular:      Comments:      Skin:     Coloration: Skin is not pale.          Neurological:      Mental Status: He is alert.   Psychiatric:         Mood and Affect: Mood normal.         Behavior: Behavior normal.         Thought Content: Thought content normal.          Accessory Clinical Findings:   Echocardiogram:  No results found for this or any previous visit.   Lab Results   Component Value Date    CHOLSTRLTOT 180 03/30/2021     (H) 03/30/2021    HDL 32 (A) 03/30/2021    TRIGLYCERIDE 210 (H) 03/30/2021      Lab Results   Component Value Date    PROTHROMBTM 14.1 03/29/2021    INR 1.06 03/29/2021       Lab Results   Component Value Date    HBA1C 8.0 (H) 03/29/2021      Lab Results   Component Value Date    SODIUM 136 03/30/2021    POTASSIUM 3.3 (L) 03/30/2021    CHLORIDE 96 03/30/2021    CO2 23 03/30/2021    GLUCOSE 100 (H) 03/30/2021    BUN 15 03/30/2021    CREATININE 0.83 03/30/2021      j  [facility]  (19):  Lp(a) normal, acr normal, gluc 171, remainder of CMP wnl, tsh wnl, konstantin/renin wnl, metaneph wnl  Wbc normal, hgb 17.6, normal plats, free cortisol wnl , vit D 12,   LDL-P 1282, small LDL-P956, high risk LDL-P size      Lab Results   Component Value Date    STMTRPT  2021     [facility]    Test Date:  2021  Pt Name:    JEAN CARLOS MIKE             Department: ER  MRN:        [id number]                      Room:       T735  Gender:     Male                         Technician: PEP  :        [id number]                   Requested By:AMARILYS CORTEZ  Order #:    [id number]                    Reading MD: Karen Blanco MD    Measurements  Intervals                                Axis  Rate:       101                          P:          37  VT:         172                          QRS:        54  QRSD:       84                           T:          -43  QT:         376  QTc:        488    Interpretive Statements  SINUS TACHYCARDIA  NONSPECIFIC T ABNORMALITIES, INFERIOR LEADS  BORDERLINE PROLONGED QT INTERVAL  BASELINE WANDER IN LEAD(S) V6  Compared to ECG 2019 14:21:58  No significant change noted  Electronically Signed On 3- 21:47:57 PDT by Karen Blanco MD       MPI   1. No evidence of infarct or ischemia.  2. Left ventricular ejection fraction calculated at 60%.    Echo stress    Normal resting echocardiogram.   Normal treadmill stress echocardiogram.   Hypertension at rest and with exercise    Echo 2020  Normal right and left ventricular size and function.   Left ventricular ejection fraction is visually estimated to be 60%.  Mildly increased septal wall thickness.  No significant valve disease or flow abnormalities.   Normal estimated right atrial pressure.   Unable to estimate pulmonary artery pressure due to an inadequate   tricuspid regurgitant jet.   No prior study is available for comparison.     CRISSY duplex 2020  No sonographic evidence of renal artery  stenosis.    YULIYA Avalos.

## 2021-04-15 ENCOUNTER — TELEPHONE (OUTPATIENT)
Dept: VASCULAR LAB | Facility: MEDICAL CENTER | Age: 61
End: 2021-04-15

## 2021-04-15 DIAGNOSIS — E78.5 DYSLIPIDEMIA: ICD-10-CM

## 2021-04-15 RX ORDER — ROSUVASTATIN CALCIUM 5 MG/1
5 TABLET, COATED ORAL EVERY EVENING
Qty: 30 TABLET | Refills: 3 | Status: SHIPPED | OUTPATIENT
Start: 2021-04-15 | End: 2023-08-23

## 2021-04-15 NOTE — TELEPHONE ENCOUNTER
Spoke with Dr Wilson regarding this pt's plan. They have referred the pt to a neuro-ophthalmologist but it is likely that it will be several months before the pt can get in to see him. Dr. Wilson would like the pt to see the Neurologist in the mean time. I informed her that the [facility] team is not excepting Medicaid and that I sent a message to the referrals office that this pt will need to be referred to another neuro office. I will continue to follow up with them to make sure that this happens. Dr. Wilson felt that the pt is in need or aggressive medical management to prevent him from having permanent damage due to his cardiovascular risks. She felt that his ischemic palsy should resolve in 3 months but if not further testing will be required at that time. She also felt that it would be reasonable to start him on a low dose statin.  Called pt to inform him that it would be ok to start a low dose statin per Dr. Wilson. Rosuvastatin 5 mg to start out 3 days a week. YULIYA Avalos.

## 2021-05-27 ENCOUNTER — OFFICE VISIT (OUTPATIENT)
Dept: VASCULAR LAB | Facility: MEDICAL CENTER | Age: 61
End: 2021-05-27
Attending: FAMILY MEDICINE
Payer: MEDICAID

## 2021-05-27 VITALS
BODY MASS INDEX: 24.5 KG/M2 | DIASTOLIC BLOOD PRESSURE: 83 MMHG | WEIGHT: 175 LBS | SYSTOLIC BLOOD PRESSURE: 123 MMHG | HEART RATE: 90 BPM | HEIGHT: 71 IN

## 2021-05-27 DIAGNOSIS — I1A.0 RESISTANT HYPERTENSION: ICD-10-CM

## 2021-05-27 DIAGNOSIS — E87.6 HYPOKALEMIA: ICD-10-CM

## 2021-05-27 DIAGNOSIS — E55.9 VITAMIN D DEFICIENCY: ICD-10-CM

## 2021-05-27 DIAGNOSIS — G47.33 OSA (OBSTRUCTIVE SLEEP APNEA): ICD-10-CM

## 2021-05-27 DIAGNOSIS — E78.5 DYSLIPIDEMIA: ICD-10-CM

## 2021-05-27 DIAGNOSIS — E11.65 UNCONTROLLED TYPE 2 DIABETES MELLITUS WITH HYPERGLYCEMIA (HCC): ICD-10-CM

## 2021-05-27 PROCEDURE — 99214 OFFICE O/P EST MOD 30 MIN: CPT | Performed by: FAMILY MEDICINE

## 2021-05-27 PROCEDURE — 99212 OFFICE O/P EST SF 10 MIN: CPT

## 2021-05-27 RX ORDER — CHLORTHALIDONE 25 MG/1
25 TABLET ORAL DAILY
Qty: 90 TABLET | Refills: 3 | Status: SHIPPED | OUTPATIENT
Start: 2021-05-27 | End: 2021-11-16

## 2021-05-27 RX ORDER — TELMISARTAN 40 MG/1
40 TABLET ORAL
Qty: 90 TABLET | Refills: 3 | Status: SHIPPED | OUTPATIENT
Start: 2021-05-27 | End: 2022-05-22

## 2021-05-27 RX ORDER — MELOXICAM 7.5 MG/1
7.5 TABLET ORAL
COMMUNITY

## 2021-05-27 ASSESSMENT — ENCOUNTER SYMPTOMS
INSOMNIA: 0
BLURRED VISION: 1
EYE DISCHARGE: 0
BLOOD IN STOOL: 0
PALPITATIONS: 0
SEIZURES: 0
DEPRESSION: 0
MYALGIAS: 0
DIZZINESS: 0
SHORTNESS OF BREATH: 0
DOUBLE VISION: 0
FOCAL WEAKNESS: 0
CHILLS: 0
HEADACHES: 0
TREMORS: 0
BACK PAIN: 0
WEAKNESS: 0
NERVOUS/ANXIOUS: 1
BRUISES/BLEEDS EASILY: 0
FEVER: 0

## 2021-05-27 NOTE — PROGRESS NOTES
RESISTANT HTN CLINIC- FOLLOW-UP   05/27/21     Assessment / Plan:     1. Blood Pressure Control:   Qualifies as resistant HTN:  No - good control <3 meds    Office BP Goal ACC/AHA (2017) goal <130/80   Home BP at goal:  Yes    Office BP at goal:  Mild high DBP only     24h ABPM (12/2019): Reasonable data acquisition. Mean daytime: 117/85 consistent with suboptimally controlled out of office diastolic blood pressure. Nocturnal dip: Appears somewhat blunted   Device candidate? no   Plan:    Monitoring:   - continue home BP monitoring, reviewed correct technique:  Yes   - monitor lytes/gfr routinely     2. Work up of Secondary Causes of Resistant Hypertension:   Renovascular HTN: Excluded  Primary Aldosteronism: Excluded  Thyroid Function: Excluded  Obstructive Sleep Apnea: on CPAP, working on mask fitting for adherence   Pheochromocytoma: Excluded   Cushing's:   Excluded   Other:   1) anxiety - still under counseling at [facility], co-morbid with eye conditions, recent job stress    Recommendations At This Visit: none         3. Medication Use / Adherence:  Assessment: Complete  Recommendations: Instructed to Continue Taking All Medications As Prescribed         4. End Organ Damage:   Left Ventricular Hypertrophy: Absent on  Echocardiogram Date: 1/2020  Albuminuria: None on Date: pending   Renal Function: Chronic Kidney Disease  G2 at worst  Established Cardiovascular Disease: None    5. Lifestyle Recommendations:    Smoking: continued complete avoidance of all tobacco products     Physical Activity: continue healthy activity to improve CV fitness, see care instructions for additional details     Weight Management and Nutrition: Dietary plan was discussed with patient at this visit including DASH, low sodium and/or as outlined in care instructions     6. Standard HTN Pharmacotherapy:  ACEI/ARB: continue telmisartan to 20mg.   Thiazide Type Diuretic: hold chlorthalidone due to hypoK+ and hypotension  Calcium Channel Blocker  (CCB): holding due to low BP     7. Additional Agents:  Aldosterone Antagonist: not indicated   Loop Diuretic: not indicated   Peripheral Alpha Blocker: not indicated   Other CCB: not indicated   Direct Vasodilator: not indicated   Centrally Acting Alpha Agonist: Not using clonidine 0.1mg for BP spikes >160/>110 only,  Other: Diuretic--Off for now  Beta blocker : not indicated   DRI: not indicated      8. Other CV Risk Factors:     1) LIPID MANAGEMENT:   Guidlelines recommend at least mod-intensity statin   Currently on statin: No   Lp(a) normal   hsCRP 1.8 (avg risk)  LDL-P 1282, small LDL-P 956, high risk LDL-P size (LDL-P/LDL-C ratio discordant)  - focus on LDL-P reduction and apoB due to discordance to LDL-C   - failed atorva due to myalgias after prior hospitalization     Tx goal: non-HDL-C <100,  LDL-C <70  (optional: apoB <80)   At goal: No    Plan:  - reinforced ongoing TLC measures   - update labs routinely   MEDS:   - continue Rosuvastatin 5 mg 3 days a week  - continue vitamin D3 5,000 units indefinitely if statin initiated     2) GLYCEMIA MANAGEMENT:  Diabetic, T2, ophthalmic complications non-insulin   Lab Results   Component Value Date    HBA1C 8.0 (H) 03/29/2021    Goal A1c < 7.5, optimal <7.0  ACR: A1  Plan:  - glyxambi 25/5 daily,   - defer overall mgmt to [facility] PCP  -metformin changed due to recall to 500 mg 4 times a day with each meal and at bedtime  - recommmend for routine care with PCP (or endocrine) to include regular A1c monitoring, annual albumin/creatinine ratio (ACR), annual diabetic retinopathy screening, foot exams, annual flu vaccine, and updates to pneumonia vaccines as appropriate     3) ANTIPLATELET/ANTICOAGULANT THERAPY: not indicated for now   - not currently indicated for now, hx of bleeding on ASA   - consider plavix     9. Other Issues:    1) DM retinopathy, proliferative and glaucoma that may require surgery    - continue care with ophthalmo for injections routinely and second  opinion for surgery      2) BARBARA on CPAP, having trouble with tolerance, overall stable   - defer mgmt to sleep MD      3) vit D deficiency, low   - increase to Vit D3 5,000 units daily   - update labs in 2 weeks     4) hypokalemia, presumed thiazide related   - increase K+-rich foods, start mag 250-500mg daily   - check BMP, Mag in 2 weeks  - consider reduction and/or cessation of thiazide based upon results     5) Lt eye palsy  - continue f/u with Dr. Pineda and retinal specialists      Studies Ordered At This Visit:  Consider cardiac CT when able to afford   Blood Work to Be Obtained Prior to Next Visit:  As noted   Disposition: 6mo or sooner prn     Diagnosis:  1. Resistant hypertension  chlorthalidone (HYGROTON) 25 MG Tab    telmisartan (MICARDIS) 40 MG Tab   2. Dyslipidemia     3. Uncontrolled type 2 diabetes mellitus with hyperglycemia (HCC)     4. BARBARA (obstructive sleep apnea)     5. Hypokalemia  Basic Metabolic Panel    MAGNESIUM   6. Vitamin D deficiency  VITAMIN D,25 HYDROXY    Cholecalciferol (VITAMIN D-3) 125 MCG (5000 UT) Tab      History of Present Illness:   Marcello Perez is male seen for evaluation of resistant HTN and management. Marcello Perez is referred by: Owen Valles D.O..     HTN  Interval hx/concerns: had been hospitalized for L nerve palsy from DM, no CVA. Started on prednisone.    Current ADRs: none   HTN sx:  Eye deviation, no other new s/s.  No HA, dizz, CP.   Home BP lo-120s/70s   24h ABPM completed: not tested  Adherence to current HTN meds: compliant all of the time     Antiplatelet/anticoagulation: No     Hyperlipidemia:    No prior statin, reports HDL has been good in the past. No prior ASCVD  Current treatment: TLC only  Myalgias? Not applicable  Other adverse drug reactions? Not applicable  Lipid profile: labs done as noted below     T2D:  Ongoing care with [facility] provider  Stable. No current symptoms reported.   Tolerating meds and no recent med  changes.   Home blood sugars stable, reports no lows.   Lab Results   Component Value Date    HBA1C 8.0 (H) 03/29/2021     Sleeping disorder/BARBARA:   Stable overall.  Currently on CPAP, good adherence, feeling good energy, well-rested in the AM, and no daytime somnolence      Current Outpatient Medications:   •  meloxicam, 7.5 mg, Oral, DAILY, Taking  •  NON SPECIFIED, Indications: Rejuvapor BCP & CBD 15mL total content, Taking  •  chlorthalidone, 25 mg, Oral, DAILY  •  telmisartan, 40 mg, Oral, QHS  •  Vitamin D-3, 1 capsule, Oral, DAILY  •  rosuvastatin, 5 mg, Oral, Q EVENING, Taking  •  potassium chloride SA, 20 mEq, Oral, DAILY, Taking  •  aspirin, 81 mg, Oral, DAILY, Taking  •  metFORMIN ER, 500 mg, Oral, BID, Taking  •  lactobacillus rhamnosus, 1 capsule, Oral, DAILY, Taking  •  latanoprost, 1 Drop, Both Eyes, Nightly, Taking  •  Glyxambi, 1 tablet, Oral, DAILY, Taking      Social History:     Social History     Tobacco Use   • Smoking status: Never Smoker   • Smokeless tobacco: Never Used   Substance Use Topics   • Alcohol use: Yes     Comment: occ   • Drug use: No         Review of Systems:   Review of Systems   Constitutional: Negative for chills, fever and malaise/fatigue.   HENT: Negative for nosebleeds.    Eyes: Positive for blurred vision (baseline ). Negative for double vision and discharge.   Respiratory: Negative for shortness of breath.    Cardiovascular: Negative for chest pain, palpitations and leg swelling.   Gastrointestinal: Negative for blood in stool and melena.   Genitourinary: Negative for hematuria.   Musculoskeletal: Negative for back pain (chronic ), joint pain and myalgias.   Skin: Negative for itching and rash.   Neurological: Negative for dizziness, tremors, focal weakness, seizures, weakness and headaches.   Endo/Heme/Allergies: Does not bruise/bleed easily.   Psychiatric/Behavioral: Negative for depression. The patient is nervous/anxious (basline). The patient does not have insomnia.   "       Objective:   Allergies:  Hctz [hydrochlorothiazide]    Vitals:    05/27/21 0924   BP: 123/83   BP Location: Left arm   Patient Position: Sitting   BP Cuff Size: Adult   Pulse: 90   Weight: 79.4 kg (175 lb)   Height: 1.791 m (5' 10.5\")     Body mass index is 24.75 kg/m².    BP:   BP Readings from Last 5 Encounters:   05/27/21 123/83   03/30/21 126/80   10/05/20 111/72   04/02/20 119/81   01/02/20 131/92      Physical Exam  Constitutional:       Appearance: Normal appearance.   Eyes:      Comments: Lt eye deviates   Cardiovascular:      Comments:      Skin:     Coloration: Skin is not pale.          Neurological:      Mental Status: He is alert.   Psychiatric:         Mood and Affect: Mood normal.         Behavior: Behavior normal.         Thought Content: Thought content normal.        Accessory Clinical Findings:   Echocardiogram:  No results found for this or any previous visit.   Lab Results   Component Value Date    CHOLSTRLTOT 180 03/30/2021     (H) 03/30/2021    HDL 32 (A) 03/30/2021    TRIGLYCERIDE 210 (H) 03/30/2021      Lab Results   Component Value Date    PROTHROMBTM 14.1 03/29/2021    INR 1.06 03/29/2021       Lab Results   Component Value Date    HBA1C 8.0 (H) 03/29/2021      Lab Results   Component Value Date    SODIUM 136 03/30/2021    POTASSIUM 3.3 (L) 03/30/2021    CHLORIDE 96 03/30/2021    CO2 23 03/30/2021    GLUCOSE 100 (H) 03/30/2021    BUN 15 03/30/2021    CREATININE 0.83 03/30/2021      j  [facility] (11/27/19):  Lp(a) normal, acr normal, gluc 171, remainder of CMP wnl, tsh wnl, konstantin/renin wnl, metaneph wnl  Wbc normal, hgb 17.6, normal plats, free cortisol wnl , vit D 12,   LDL-P 1282, small LDL-P956, high risk LDL-P size      Lab Results   Component Value Date    STMTRPT  03/29/2021     [facility]    Test Date:  2021-03-29  Pt Name:    JEAN CARLOS MIKE             Department: ER  MRN:        [id number]                      Room:       T7  Gender:     Male                        "  Technician: PEP  :        1960                   Requested By:AMARILYS CORTEZ  Order #:    [id number]                    Reading MD: Karen Blanco MD    Measurements  Intervals                                Axis  Rate:       101                          P:          37  RI:         172                          QRS:        54  QRSD:       84                           T:          -43  QT:         376  QTc:        488    Interpretive Statements  SINUS TACHYCARDIA  NONSPECIFIC T ABNORMALITIES, INFERIOR LEADS  BORDERLINE PROLONGED QT INTERVAL  BASELINE WANDER IN LEAD(S) V6  Compared to ECG 2019 14:21:58  No significant change noted  Electronically Signed On 3- 21:47:57 PDT by Karen Blanco MD       MPI   1. No evidence of infarct or ischemia.  2. Left ventricular ejection fraction calculated at 60%.    Echo stress    Normal resting echocardiogram.   Normal treadmill stress echocardiogram.   Hypertension at rest and with exercise    Echo 2020  Normal right and left ventricular size and function.   Left ventricular ejection fraction is visually estimated to be 60%.  Mildly increased septal wall thickness.  No significant valve disease or flow abnormalities.   Normal estimated right atrial pressure.   Unable to estimate pulmonary artery pressure due to an inadequate   tricuspid regurgitant jet.   No prior study is available for comparison.     CRISSY duplex 2020  No sonographic evidence of renal artery stenosis.    Alonso Dover M.D.   [facility]   [facility]   [phone]

## 2021-11-16 ENCOUNTER — OFFICE VISIT (OUTPATIENT)
Dept: VASCULAR LAB | Facility: MEDICAL CENTER | Age: 61
End: 2021-11-16
Attending: INTERNAL MEDICINE
Payer: MEDICAID

## 2021-11-16 VITALS
HEART RATE: 91 BPM | SYSTOLIC BLOOD PRESSURE: 134 MMHG | HEIGHT: 71 IN | BODY MASS INDEX: 25.62 KG/M2 | DIASTOLIC BLOOD PRESSURE: 88 MMHG | WEIGHT: 183 LBS

## 2021-11-16 DIAGNOSIS — E87.6 HYPOKALEMIA: ICD-10-CM

## 2021-11-16 DIAGNOSIS — I1A.0 RESISTANT HYPERTENSION: ICD-10-CM

## 2021-11-16 DIAGNOSIS — E11.65 UNCONTROLLED TYPE 2 DIABETES MELLITUS WITH HYPERGLYCEMIA (HCC): ICD-10-CM

## 2021-11-16 DIAGNOSIS — E78.5 DYSLIPIDEMIA: ICD-10-CM

## 2021-11-16 PROCEDURE — 99212 OFFICE O/P EST SF 10 MIN: CPT

## 2021-11-16 PROCEDURE — 99214 OFFICE O/P EST MOD 30 MIN: CPT | Performed by: NURSE PRACTITIONER

## 2021-11-16 RX ORDER — CHLORTHALIDONE 25 MG/1
12.5 TABLET ORAL DAILY
Qty: 45 TABLET | Refills: 3 | Status: SHIPPED | OUTPATIENT
Start: 2021-11-16 | End: 2022-11-11

## 2021-11-16 ASSESSMENT — ENCOUNTER SYMPTOMS
FOCAL WEAKNESS: 0
BLURRED VISION: 1
DOUBLE VISION: 0
DIZZINESS: 0
BLOOD IN STOOL: 0
BACK PAIN: 0
MYALGIAS: 0
SHORTNESS OF BREATH: 0
BRUISES/BLEEDS EASILY: 0
NERVOUS/ANXIOUS: 0
HEADACHES: 0
TREMORS: 0
PALPITATIONS: 0
INSOMNIA: 0
EYE DISCHARGE: 0

## 2021-11-16 NOTE — PROGRESS NOTES
RESISTANT HTN CLINIC- FOLLOW-UP   11/16/21     Assessment / Plan:     1. Blood Pressure Control:   Qualifies as resistant HTN:  No - good control <3 meds    Office BP Goal ACC/AHA (2017) goal <130/80   Home BP at goal:  Yes: mostly low 100s/60s (some lightheaded/dizziness)   Office BP at goal:  Mild high ? White coat    24h ABPM (12/2019): Reasonable data acquisition. Mean daytime: 117/85 consistent with suboptimally controlled out of office diastolic blood pressure. Nocturnal dip: Appears somewhat blunted   Device candidate? no   Plan:    Monitoring:   - continue home BP monitoring, reviewed correct technique:  Yes   - monitor lytes/gfr routinely     2. Work up of Secondary Causes of Resistant Hypertension:   Renovascular HTN: Excluded  Primary Aldosteronism: Excluded  Thyroid Function: Excluded  Obstructive Sleep Apnea: on CPAP  Pheochromocytoma: Excluded   Cushing's:   Excluded   Other:   Recommendations At This Visit: none         3. Medication Use / Adherence:  Assessment: Complete  Recommendations: Instructed to Continue Taking All Medications As Prescribed         4. End Organ Damage:   Left Ventricular Hypertrophy: Absent on  Echocardiogram Date: 1/2020  Albuminuria: None   Renal Function: Chronic Kidney Disease  G2 at worst  Established Cardiovascular Disease: None    5. Lifestyle Recommendations:    Smoking: pt is now vaping BCP products at the direction of Dr. Tirado for his glaucoma.  He understands that vaping is at his own risk.    Physical Activity: continue healthy activity to improve CV fitness including daily walking and PT    Weight Management and Nutrition: Dietary plan was discussed with patient at this visit including DASH, low sodium    6. Standard HTN Pharmacotherapy:  ACEI/ARB: continue telmisartan 40mg.   Thiazide Type Diuretic: decrease chlorthalidone to 12.5mg daily d/t dizziness and low BP readings; consider d/c in future  Calcium Channel Blocker (CCB): holding due to low BP     7.  Additional Agents:  Aldosterone Antagonist: not indicated   Loop Diuretic: not indicated   Peripheral Alpha Blocker: not indicated   Other CCB: not indicated   Direct Vasodilator: not indicated   Centrally Acting Alpha Agonist: Not using clonidine 0.1mg for BP spikes >160/>110 only,  Other: Diuretic--Off for now  Beta blocker : not indicated   DRI: not indicated      8. Other CV Risk Factors:     1) LIPID MANAGEMENT:   Guidlelines recommend at least mod-intensity statin   Currently on statin: No   Lp(a) normal   hsCRP 1.8 (avg risk)  LDL-P 1282, small LDL-P 956, high risk LDL-P size (LDL-P/LDL-C ratio discordant)  - focus on LDL-P reduction and apoB due to discordance to LDL-C   - failed atorva due to myalgias after prior hospitalization     Tx goal: non-HDL-C <100,  LDL-C <70  (optional: apoB <80)   At goal: needs labs  Plan:  - reinforced ongoing TLC measures   - update labs once fasting  MEDS:   - continue Rosuvastatin 5 mg 3 days a week  - continue vitamin D3 5,000 units     2) GLYCEMIA MANAGEMENT:  Diabetic, T2, ophthalmic complications non-insulin   Lab Results   Component Value Date    HBA1C 8.0 (H) 03/29/2021    Goal A1c < 7.5, optimal <7.0  ACR: A1  Plan:  - glyxambi 25/5 daily,   - defer overall mgmt to [facility] PCP  - recommmend for routine care with PCP (or endocrine) to include regular A1c monitoring, annual albumin/creatinine ratio (ACR), annual diabetic retinopathy screening, foot exams, annual flu vaccine, and updates to pneumonia vaccines as appropriate     3) ANTIPLATELET/ANTICOAGULANT THERAPY: yes   - on asa 81mg daily.  In the past has had nosebleeds on daily dosing that improved with qod dosing.  Currently, he is not having issues with epistaxis.  Discussed moistening the nasal passages with vaseline prior to sleep and staying well hydrated    9. Other Issues:    1) DM retinopathy, proliferative and glaucoma that may require surgery    - continue care with ophthalmo for injections routinely as well  as homeopath f/u- defer to other specialties    2) BARBARA on CPAP, having trouble with tolerance, overall stable   - defer mgmt to sleep MD      3) vit D deficiency- stable 7/2021 (vit D 52)  - continue Vit D3 5,000 units daily     4) hypokalemia, previously presumed thiazide related   - increase K+-rich foods, continue mag 250-500mg daily   - check CMP  - consider reduction and/or cessation of thiazide based upon results     5) Lt eye palsy  - continue f/u with Dr. Pineda and retinal specialists      Studies Ordered At This Visit:  Consider cardiac CT when able to afford   Blood Work to Be Obtained Prior to Next Visit:  As noted   Disposition: 8wks    Diagnosis:  1. Resistant hypertension  Comp Metabolic Panel    CBC WITHOUT DIFFERENTIAL    chlorthalidone (HYGROTON) 25 MG Tab   2. Dyslipidemia  CBC WITHOUT DIFFERENTIAL    Lipid Profile   3. Uncontrolled type 2 diabetes mellitus with hyperglycemia (HCC)  Comp Metabolic Panel    CBC WITHOUT DIFFERENTIAL   4. Hypokalemia  Comp Metabolic Panel      History of Present Illness:   Marcello Perez is male seen for evaluation of resistant HTN and management. Marcello Perez is referred by: Owen Valles D.O..       Lab Results   Component Value Date    HBA1C 8.0 (H) 03/29/2021     Sleeping disorder/BARBARA:   Stable overall.  Currently on CPAP, good adherence, feeling good energy, well-rested in the AM, and no daytime somnolence      Current Outpatient Medications:   •  chlorthalidone, 12.5 mg, Oral, DAILY  •  meloxicam, 7.5 mg, Oral, DAILY, Taking  •  NON SPECIFIED, Indications: Rejuvapor BCP & CBD 15mL total content, Taking  •  telmisartan, 40 mg, Oral, QHS, Taking  •  Vitamin D-3, 1 Capsule, Oral, DAILY, Taking  •  rosuvastatin, 5 mg, Oral, Q EVENING, Taking  •  potassium chloride SA, 20 mEq, Oral, DAILY, Taking  •  aspirin, 81 mg, Oral, DAILY, Taking  •  metFORMIN ER, 500 mg, Oral, BID, Taking  •  lactobacillus rhamnosus, 1 Capsule, Oral, DAILY, Taking  •   "latanoprost, 1 Drop, Both Eyes, Nightly, Taking  •  Glyxambi, 1 Tablet, Oral, DAILY, Taking      Social History:     Social History     Tobacco Use   • Smoking status: Never Smoker   • Smokeless tobacco: Never Used   Substance Use Topics   • Alcohol use: Yes     Comment: occ   • Drug use: No         Review of Systems:   Review of Systems   Constitutional: Negative for malaise/fatigue.   HENT: Negative for nosebleeds.    Eyes: Positive for blurred vision (baseline ). Negative for double vision and discharge.   Respiratory: Negative for shortness of breath.    Cardiovascular: Negative for chest pain, palpitations and leg swelling.   Gastrointestinal: Negative for blood in stool and melena.   Genitourinary: Negative for hematuria.   Musculoskeletal: Negative for back pain (chronic ), joint pain and myalgias.   Neurological: Negative for dizziness, tremors, focal weakness and headaches.   Endo/Heme/Allergies: Does not bruise/bleed easily.   Psychiatric/Behavioral: The patient is not nervous/anxious and does not have insomnia.         Objective:   Allergies:  Hctz [hydrochlorothiazide]    Vitals:    11/16/21 0929   BP: 134/88   BP Location: Left arm   Patient Position: Sitting   BP Cuff Size: Adult   Pulse: 91   Weight: 83 kg (183 lb)   Height: 1.791 m (5' 10.5\")     Body mass index is 25.89 kg/m².    BP:   BP Readings from Last 5 Encounters:   11/16/21 134/88   05/27/21 123/83   03/30/21 126/80   10/05/20 111/72   04/02/20 119/81      Physical Exam  Constitutional:       General: He is not in acute distress.     Appearance: Normal appearance. He is normal weight. He is not diaphoretic.   Eyes:      Comments: Lt eye deviates   Cardiovascular:      Rate and Rhythm: Normal rate and regular rhythm.      Pulses: Normal pulses.      Heart sounds: Normal heart sounds. No murmur heard.       Comments:      Pulmonary:      Effort: Pulmonary effort is normal. No respiratory distress.      Breath sounds: Normal breath sounds. No " wheezing.   Musculoskeletal:         General: No swelling. Normal range of motion.   Skin:     Coloration: Skin is not pale.          Neurological:      Mental Status: He is alert and oriented to person, place, and time.      Motor: No weakness.   Psychiatric:         Mood and Affect: Mood normal.         Behavior: Behavior normal.         Thought Content: Thought content normal.        Accessory Clinical Findings:   Echocardiogram:  No results found for this or any previous visit.   Lab Results   Component Value Date    CHOLSTRLTOT 180 2021     (H) 2021    HDL 32 (A) 2021    TRIGLYCERIDE 210 (H) 2021      Lab Results   Component Value Date    PROTHROMBTM 14.1 2021    INR 1.06 2021       Lab Results   Component Value Date    HBA1C 8.0 (H) 2021      Lab Results   Component Value Date    SODIUM 136 2021    POTASSIUM 3.3 (L) 2021    CHLORIDE 96 2021    CO2 23 2021    GLUCOSE 100 (H) 2021    BUN 15 2021    CREATININE 0.83 2021      j  [facility] (19):  Lp(a) normal, acr normal, gluc 171, remainder of CMP wnl, tsh wnl, konstantin/renin wnl, metaneph wnl  Wbc normal, hgb 17.6, normal plats, free cortisol wnl , vit D 12,   LDL-P 1282, small LDL-P956, high risk LDL-P size      Lab Results   Component Value Date    STMTRPT  2021     [facility]    Test Date:  2021  Pt Name:    JEAN CARLOS MIKE             Department: ER  MRN:        [id number]                      Room:       [facility]  Gender:     Male                         Technician: PEP  :        1960                   Requested By:AMARILYS CORTEZ  Order #:    [id number]                    Reading MD: Karen Blanco MD    Measurements  Intervals                                Axis  Rate:       101                          P:          37  OR:         172                          QRS:        54  QRSD:       84                           T:          -43  QT:         376  QTc:         488    Interpretive Statements  SINUS TACHYCARDIA  NONSPECIFIC T ABNORMALITIES, INFERIOR LEADS  BORDERLINE PROLONGED QT INTERVAL  BASELINE WANDER IN LEAD(S) V6  Compared to ECG 04/21/2019 14:21:58  No significant change noted  Electronically Signed On 3- 21:47:57 PDT by Karen Blanco MD       MPI 2011  1. No evidence of infarct or ischemia.  2. Left ventricular ejection fraction calculated at 60%.    Echo stress 2012   Normal resting echocardiogram.   Normal treadmill stress echocardiogram.   Hypertension at rest and with exercise    Echo 1/2020  Normal right and left ventricular size and function.   Left ventricular ejection fraction is visually estimated to be 60%.  Mildly increased septal wall thickness.  No significant valve disease or flow abnormalities.   Normal estimated right atrial pressure.   Unable to estimate pulmonary artery pressure due to an inadequate   tricuspid regurgitant jet.   No prior study is available for comparison.     CRISSY duplex 1/2020  No sonographic evidence of renal artery stenosis.    YULIYA Nicole.   [facility]   [place] [facility]   [phone]

## 2022-02-08 ENCOUNTER — OFFICE VISIT (OUTPATIENT)
Dept: VASCULAR LAB | Facility: MEDICAL CENTER | Age: 62
End: 2022-02-08
Attending: INTERNAL MEDICINE
Payer: MEDICAID

## 2022-02-08 VITALS
SYSTOLIC BLOOD PRESSURE: 151 MMHG | HEART RATE: 81 BPM | HEIGHT: 70 IN | DIASTOLIC BLOOD PRESSURE: 97 MMHG | WEIGHT: 179 LBS | BODY MASS INDEX: 25.62 KG/M2

## 2022-02-08 DIAGNOSIS — I1A.0 RESISTANT HYPERTENSION: ICD-10-CM

## 2022-02-08 DIAGNOSIS — E78.5 DYSLIPIDEMIA: ICD-10-CM

## 2022-02-08 PROCEDURE — 99212 OFFICE O/P EST SF 10 MIN: CPT | Performed by: NURSE PRACTITIONER

## 2022-02-08 PROCEDURE — 99212 OFFICE O/P EST SF 10 MIN: CPT

## 2022-02-08 PROCEDURE — 99214 OFFICE O/P EST MOD 30 MIN: CPT | Performed by: NURSE PRACTITIONER

## 2022-02-08 RX ORDER — EZETIMIBE 10 MG/1
10 TABLET ORAL DAILY
Qty: 30 TABLET | Refills: 6 | Status: SHIPPED | OUTPATIENT
Start: 2022-02-08 | End: 2023-08-23

## 2022-02-09 NOTE — PROGRESS NOTES
RESISTANT HTN CLINIC- FOLLOW-UP   2/9/22    Assessment / Plan:     1. Blood Pressure Control:   Qualifies as resistant HTN:  No - good control <3 meds    Office BP Goal ACC/AHA (2017) goal <130/80   Home BP at goal:  Yes per pt: 120-130/70s   Office BP at goal:  High today- he forgot to take his meds this morning   24h ABPM (12/2019): Reasonable data acquisition. Mean daytime: 117/85 consistent with suboptimally controlled out of office diastolic blood pressure. Nocturnal dip: Appears somewhat blunted   Device candidate? no   Plan:    Monitoring:   - continue home BP monitoring, reviewed correct technique:  Yes   - monitor lytes/gfr routinely     2. Work up of Secondary Causes of Resistant Hypertension:   Renovascular HTN: Excluded  Primary Aldosteronism: Excluded  Thyroid Function: Excluded  Obstructive Sleep Apnea: on CPAP, although machine was recently recalled and he has been without for several weeks; has calls into pulm and his supply co  Pheochromocytoma: Excluded   Cushing's:   Excluded   Other:   Recommendations At This Visit: none         3. Medication Use / Adherence:  Assessment: Complete  Recommendations: Instructed to Continue Taking All Medications As Prescribed         4. End Organ Damage:   Left Ventricular Hypertrophy: Absent on  Echocardiogram Date: 1/2020  Albuminuria: None   Renal Function: Chronic Kidney Disease  G2 at worst  Established Cardiovascular Disease: None    5. Lifestyle Recommendations:    Smoking: pt continues vaping BCP products at the direction of Dr. Tirado for his glaucoma.  He understands that vaping is at his own risk.    Physical Activity: continue healthy activity to improve CV fitness including daily walking and PT exercises    Weight Management and Nutrition: Dietary plan was discussed with patient at this visit including DASH, low sodium    6. Standard HTN Pharmacotherapy:  ACEI/ARB: continue telmisartan 40mg.   Thiazide Type Diuretic: continue chlorthalidone 12.5mg  daily; can consider inc to 25mg if BP elevated in future.  Was previously having low readings and therefore the decrease in dosage at last visit  Calcium Channel Blocker (CCB): continue to hold at present     7. Additional Agents:  Aldosterone Antagonist: not indicated   Loop Diuretic: not indicated   Peripheral Alpha Blocker: not indicated   Other CCB: not indicated   Direct Vasodilator: not indicated   Centrally Acting Alpha Agonist: Not using clonidine.  Has 0.1mg for BP spikes >160/>110 only,  Beta blocker : not indicated   DRI: not indicated      8. Other CV Risk Factors:     1) LIPID MANAGEMENT:   Guidlelines recommend at least mod-intensity statin   Currently on statin: No   Lp(a) normal   hsCRP 1.8 (avg risk)  LDL-P 1282, small LDL-P 956, high risk LDL-P size (LDL-P/LDL-C ratio discordant)  - focus on LDL-P reduction and apoB due to discordance to LDL-C   - failed atorva due to myalgias after prior hospitalization.  Recently was on rosuvastatin 3 days/wk and had severe nausea and stomach upset    Tx goal: non-HDL-C <100,  LDL-C <70  (optional: apoB <80)   At goal: no, nonHDL-119, LDL-86,  (12/2021, on no cholesterol-lowering medications)  Plan:  - reinforced ongoing TLC measures   - pt unwilling to rechallange a different statin at this time  - start Zetia 10mg daily  - continue vitamin D3 5,000 units   - labs in 10 wks    2) GLYCEMIA MANAGEMENT:  Diabetic, T2, ophthalmic complications non-insulin   Lab Results   Component Value Date    HBA1C 8.0 (H) 03/29/2021    Goal A1c < 7.5, optimal <7.0  ACR: A1  Plan:  - continue meds  - defer overall mgmt to [facility] PCP  - recommmend for routine care with PCP (or endocrine) to include regular A1c monitoring, annual albumin/creatinine ratio (ACR), annual diabetic retinopathy screening, foot exams, annual flu vaccine, and updates to pneumonia vaccines as appropriate     3) ANTIPLATELET/ANTICOAGULANT THERAPY: yes   - cont asa 81mg daily    9. Other Issues:    1) DM  retinopathy, proliferative and glaucoma that may require surgery    - continue care with ophthalmo for injections routinely as well as homeopath f/u- defer to other specialties    2) BARBARA previously on CPAP- machine recently recalled  - urged pt to continue to advocate for a new machine with pulm/supply co  - defer mgmt to sleep MD      3) vit D deficiency- stable 7/2021 (vit D 52)  - continue Vit D3 5,000 units daily     4) Lt eye palsy  - continue f/u with Dr. Pineda and retinal specialists      Studies Ordered At This Visit:  Consider cardiac CT when able to afford   Blood Work to Be Obtained Prior to Next Visit:  Lipids, cmp 10wks after starting Zetia  Disposition: 12wks    Diagnosis:  1. Dyslipidemia  ezetimibe (ZETIA) 10 MG Tab    Lipid Profile    Comp Metabolic Panel      History of Present Illness:   Marcello Perez is male seen for evaluation of resistant HTN and management. Marcello Perez is referred by: Owen Valles D.O..       Lab Results   Component Value Date    HBA1C 8.0 (H) 03/29/2021       Current Outpatient Medications:   •  ezetimibe, 10 mg, Oral, DAILY  •  chlorthalidone, 12.5 mg, Oral, DAILY, Taking  •  meloxicam, 7.5 mg, Oral, DAILY, Taking  •  NON SPECIFIED, Indications: Rejuvapor BCP & CBD 15mL total content, Taking  •  telmisartan, 40 mg, Oral, QHS, Taking  •  Vitamin D-3, 1 Capsule, Oral, DAILY, Taking  •  rosuvastatin, 5 mg, Oral, Q EVENING, Taking  •  potassium chloride SA, 20 mEq, Oral, DAILY, Taking  •  aspirin, 81 mg, Oral, DAILY, Taking  •  metFORMIN ER, 500 mg, Oral, BID, Taking  •  lactobacillus rhamnosus, 1 Capsule, Oral, DAILY, Taking  •  latanoprost, 1 Drop, Both Eyes, Nightly, Taking  •  Glyxambi, 1 Tablet, Oral, DAILY, Taking      Social History:     Social History     Tobacco Use   • Smoking status: Never Smoker   • Smokeless tobacco: Never Used   Substance Use Topics   • Alcohol use: Yes     Comment: occ   • Drug use: No           Objective:  "  Allergies:  Hctz [hydrochlorothiazide]    Vitals:    02/08/22 1611 02/08/22 1614   BP: 159/101 151/97   BP Location: Left arm Left arm   Patient Position: Sitting Sitting   BP Cuff Size: Adult Adult   Pulse: 85 81   Weight: 81.2 kg (179 lb)    Height: 1.778 m (5' 10\")      Body mass index is 25.68 kg/m².    BP:   BP Readings from Last 5 Encounters:   02/08/22 151/97   11/16/21 134/88   05/27/21 123/83   03/30/21 126/80   10/05/20 111/72      Physical Exam  Constitutional:       General: He is not in acute distress.     Appearance: Normal appearance. He is normal weight. He is not diaphoretic.   Eyes:      Comments: Lt eye deviates   Cardiovascular:      Rate and Rhythm: Normal rate and regular rhythm.      Pulses: Normal pulses.      Heart sounds: Normal heart sounds. No murmur heard.       Comments:      Pulmonary:      Effort: Pulmonary effort is normal. No respiratory distress.      Breath sounds: Normal breath sounds. No wheezing.   Musculoskeletal:         General: No swelling. Normal range of motion.   Skin:     Coloration: Skin is not pale.          Neurological:      Mental Status: He is alert and oriented to person, place, and time.      Motor: No weakness.   Psychiatric:         Mood and Affect: Mood normal.         Behavior: Behavior normal.         Thought Content: Thought content normal.        Accessory Clinical Findings:   Echocardiogram:  No results found for this or any previous visit.   Lab Results   Component Value Date    CHOLSTRLTOT 180 03/30/2021     (H) 03/30/2021    HDL 32 (A) 03/30/2021    TRIGLYCERIDE 210 (H) 03/30/2021      Lab Results   Component Value Date    PROTHROMBTM 14.1 03/29/2021    INR 1.06 03/29/2021       Lab Results   Component Value Date    HBA1C 8.0 (H) 03/29/2021      Lab Results   Component Value Date    SODIUM 136 03/30/2021    POTASSIUM 3.3 (L) 03/30/2021    CHLORIDE 96 03/30/2021    CO2 23 03/30/2021    GLUCOSE 100 (H) 03/30/2021    BUN 15 03/30/2021    " CREATININE 0.83 2021      j  [facility] (19):  Lp(a) normal, acr normal, gluc 171, remainder of CMP wnl, tsh wnl, konstantin/renin wnl, metaneph wnl  Wbc normal, hgb 17.6, normal plats, free cortisol wnl , vit D 12,   LDL-P 1282, small LDL-P956, high risk LDL-P size         [facility] labs 21  nonHDL-119, LDL-86, , gluc 149, Hgb/Hct 18/53.4    Lab Results   Component Value Date    STMTRPT  2021     [facility]    Test Date:  2021  Pt Name:    JEAN CARLOS MIKE             Department: ER  MRN:        [id number]                      Room:       T735  Gender:     Male                         Technician: PEP  :        1960                   Requested By:AMARILYS CORTEZ  Order #:    [id number]                    Reading MD: Karen Blanco MD    Measurements  Intervals                                Axis  Rate:       101                          P:          37  MA:         172                          QRS:        54  QRSD:       84                           T:          -43  QT:         376  QTc:        488    Interpretive Statements  SINUS TACHYCARDIA  NONSPECIFIC T ABNORMALITIES, INFERIOR LEADS  BORDERLINE PROLONGED QT INTERVAL  BASELINE WANDER IN LEAD(S) V6  Compared to ECG 2019 14:21:58  No significant change noted  Electronically Signed On 3- 21:47:57 PDT by Karen Blanco MD       MPI   1. No evidence of infarct or ischemia.  2. Left ventricular ejection fraction calculated at 60%.    Echo stress    Normal resting echocardiogram.   Normal treadmill stress echocardiogram.   Hypertension at rest and with exercise    Echo 2020  Normal right and left ventricular size and function.   Left ventricular ejection fraction is visually estimated to be 60%.  Mildly increased septal wall thickness.  No significant valve disease or flow abnormalities.   Normal estimated right atrial pressure.   Unable to estimate pulmonary artery pressure due to an inadequate   tricuspid regurgitant  jet.   No prior study is available for comparison.     CRISSY duplex 1/2020  No sonographic evidence of renal artery stenosis.    YULIYA Nicole.   [facility]   [facility]   [phone]

## 2022-04-25 LAB — HBA1C MFR BLD: 10 % (ref 0–5.6)

## 2022-05-24 DIAGNOSIS — E11.65 UNCONTROLLED TYPE 2 DIABETES MELLITUS WITH HYPERGLYCEMIA (HCC): ICD-10-CM

## 2023-06-19 LAB — HBA1C MFR BLD: 10.7 % (ref ?–5.8)

## 2023-06-21 ENCOUNTER — TELEPHONE (OUTPATIENT)
Dept: VASCULAR LAB | Facility: MEDICAL CENTER | Age: 63
End: 2023-06-21
Payer: MEDICAID

## 2023-06-21 NOTE — TELEPHONE ENCOUNTER
Per pt , had recent labs done at [facility]    Request sent to get most recent lab work  Confirmation scanned to pt media     Pt also requested to have reminder appt sent to mail    Appt card sent to pt address(confirmed on file)

## 2023-07-25 ENCOUNTER — OFFICE VISIT (OUTPATIENT)
Dept: VASCULAR LAB | Facility: MEDICAL CENTER | Age: 63
End: 2023-07-25
Attending: NURSE PRACTITIONER
Payer: MEDICAID

## 2023-07-25 VITALS
DIASTOLIC BLOOD PRESSURE: 85 MMHG | BODY MASS INDEX: 25.2 KG/M2 | SYSTOLIC BLOOD PRESSURE: 123 MMHG | WEIGHT: 180 LBS | HEART RATE: 86 BPM | HEIGHT: 71 IN

## 2023-07-25 DIAGNOSIS — E11.65 UNCONTROLLED TYPE 2 DIABETES MELLITUS WITH HYPERGLYCEMIA (HCC): ICD-10-CM

## 2023-07-25 DIAGNOSIS — E78.5 DYSLIPIDEMIA: ICD-10-CM

## 2023-07-25 DIAGNOSIS — I1A.0 RESISTANT HYPERTENSION: ICD-10-CM

## 2023-07-25 PROCEDURE — 99214 OFFICE O/P EST MOD 30 MIN: CPT | Performed by: NURSE PRACTITIONER

## 2023-07-25 PROCEDURE — 3079F DIAST BP 80-89 MM HG: CPT | Performed by: NURSE PRACTITIONER

## 2023-07-25 PROCEDURE — 3074F SYST BP LT 130 MM HG: CPT | Performed by: NURSE PRACTITIONER

## 2023-07-25 PROCEDURE — 99212 OFFICE O/P EST SF 10 MIN: CPT

## 2023-07-25 RX ORDER — TELMISARTAN 40 MG/1
TABLET ORAL
COMMUNITY
Start: 2023-07-03 | End: 2023-07-25

## 2023-07-25 RX ORDER — CHLORTHALIDONE 25 MG/1
TABLET ORAL
COMMUNITY
End: 2023-07-25

## 2023-07-25 RX ORDER — TELMISARTAN 40 MG/1
40 TABLET ORAL DAILY
Qty: 90 TABLET | Refills: 1 | Status: SHIPPED | OUTPATIENT
Start: 2023-07-25 | End: 2024-02-08

## 2023-07-25 RX ORDER — CHLORTHALIDONE 25 MG/1
25 TABLET ORAL DAILY
Qty: 90 TABLET | Refills: 1 | Status: SHIPPED | OUTPATIENT
Start: 2023-07-25 | End: 2023-12-21

## 2023-07-25 RX ORDER — DORZOLAMIDE HYDROCHLORIDE AND TIMOLOL MALEATE 20; 5 MG/ML; MG/ML
SOLUTION/ DROPS OPHTHALMIC
COMMUNITY

## 2023-07-25 NOTE — PROGRESS NOTES
RESISTANT HTN CLINIC- FOLLOW-UP   7/25/23    Assessment / Plan:       Pt has not been seen since Feb 2022      1. Blood Pressure Control:   Qualifies as resistant HTN:  No - good control <3 meds    Office BP Goal ACC/AHA (2017) goal <130/80   Home BP at goal:  Yes 120-130/70s   Office BP at goal:  Yes; mildly elevated diastolic today   24h ABPM (12/2019): Reasonable data acquisition. Mean daytime: 117/85 consistent with suboptimally controlled out of office diastolic blood pressure. Nocturnal dip: Appears somewhat blunted  Plan:    Monitoring:   - continue home BP monitoring, reviewed correct technique:  Yes   - monitor lytes/gfr routinely     2. Work up of Secondary Causes of Resistant Hypertension:   Renovascular HTN: Excluded  Primary Aldosteronism: Excluded  Thyroid Function: Excluded  Obstructive Sleep Apnea:  no longer on CPAP; states he sleeps well w/o snoring or daytime somnolence  Pheochromocytoma: Excluded   Cushing's:   Excluded   Other:   Recommendations At This Visit: none         3. Medication Use / Adherence:  Assessment: Complete  Recommendations: Instructed to Continue Taking All Medications As Prescribed         4. End Organ Damage:   Left Ventricular Hypertrophy: Absent on  Echocardiogram Date: 1/2020  Albuminuria: None   Renal Function: Chronic Kidney Disease   G2 at worst  but no recent labs  Established Cardiovascular Disease: None    5. Lifestyle Recommendations:    Smoking: pt continues vaping BCP products at the direction of Dr. Tirado for his glaucoma.  He understands that vaping is at his own risk.    Physical Activity: continue healthy activity to improve CV fitness including daily walking     Weight Management and Nutrition: Dietary plan was discussed with patient at this visit including DASH, low sodium    6. Standard HTN Pharmacotherapy:  ACEI/ARB: continue telmisartan 40mg  Thiazide Type Diuretic: continue chlorthalidone 25mg  Calcium Channel Blocker (CCB): continue to hold at  present     7. Additional Agents:  Aldosterone Antagonist: not indicated   Loop Diuretic: not indicated   Peripheral Alpha Blocker: not indicated   Other CCB: not indicated   Direct Vasodilator: not indicated   Centrally Acting Alpha Agonist: none  Beta blocker : not indicated   DRI: not indicated      8. Other CV Risk Factors:     1) LIPID MANAGEMENT:   Guidlelines recommend at least mod-intensity statin   Currently on statin: Yes  Lp(a) normal   hsCRP 1.8 (avg risk)  - focus on LDL-P reduction and apoB due to discordance to LDL-C   - failed atorva due to myalgias after prior hospitalization    Tx goal: non-HDL-C <100,  LDL-C <70  (optional: apoB <80)   At goal: unknown  Plan:  - reinforced ongoing TLC measures   - continue rosuvastatin 5mg daily  - continue Zetia 10mg daily  - continue vitamin D3 5,000 units   - recheck fasting lipid panel    2) GLYCEMIA MANAGEMENT:  Diabetic, T2, ophthalmic complications non-insulin   Lab Results   Component Value Date    HBA1C 10 (A) 04/25/2022    Goal A1c < 7.5, optimal <7.0  ACR: A1  Plan:  - referral to [facility] pharmacotherapy clinic; est care in 2wks  - continue current meds for now  - recommmend for routine care with to include regular A1c monitoring, annual albumin/creatinine ratio (ACR), annual diabetic retinopathy screening, foot exams, annual flu vaccine, and updates to pneumonia vaccines as appropriate     3) ANTIPLATELET/ANTICOAGULANT THERAPY: yes   - cont asa 81mg daily    9. Other Issues:    1) DM retinopathy, proliferative and glaucoma that may require surgery    - continue care with ophthalmo for injections routinely as well as homeopath f/u- defer to other specialties    2) BARBARA- off cpap.  Pt states he was getting constant headaches when he used CPAP; that has resolved and his sleep improved once he stopped.  - defer mgmt to sleep MD      3) vit D deficiency- stable 7/2021 (vit D 52)  - continue Vit D3 5,000 units daily     4) Lt eye palsy  - continue f/u with   [facility] and retinal specialists      Studies Ordered At This Visit:  Ref to [facility] pharmacotherapy clinic, consider cardiac CT if/when able to afford   Blood Work to Be Obtained Prior to Next Visit:  Lipids, cmp, urine for ma  Disposition: est with [facility] clinic in 2wks, f/u in Dec with Dr. Bloch, per pt request    Diagnosis:  No diagnosis found.     History of Present Illness:   Marcello Perez is male seen for evaluation of resistant HTN and management. Marcello Perez is referred by: Owen Valles D.O..       Lab Results   Component Value Date    HBA1C 10 (A) 04/25/2022       Current Outpatient Medications:     ezetimibe, 10 mg, Oral, DAILY    meloxicam, 7.5 mg, Oral, DAILY    NON SPECIFIED, Indications: Rejuvapor BCP & CBD 15mL total content    rosuvastatin, 5 mg, Oral, Q EVENING    potassium chloride SA, 20 mEq, Oral, DAILY    aspirin, 81 mg, Oral, DAILY    metFORMIN ER, 500 mg, Oral, BID    lactobacillus rhamnosus, 1 Capsule, Oral, DAILY    latanoprost, 1 Drop, Both Eyes, Nightly    Glyxambi, 1 Tablet, Oral, DAILY      Social History:     Social History     Tobacco Use    Smoking status: Never    Smokeless tobacco: Never   Substance Use Topics    Alcohol use: Yes     Comment: occ    Drug use: No           Objective:   Allergies:  Hctz [hydrochlorothiazide]    There were no vitals filed for this visit.    There is no height or weight on file to calculate BMI.    BP:   BP Readings from Last 5 Encounters:   02/08/22 151/97   11/16/21 134/88   05/27/21 123/83   03/30/21 126/80   10/05/20 111/72      Physical Exam  Constitutional:       General: He is not in acute distress.     Appearance: Normal appearance. He is normal weight. He is not diaphoretic.   Cardiovascular:      Rate and Rhythm: Normal rate and regular rhythm.      Pulses: Normal pulses.      Heart sounds: Normal heart sounds. No murmur heard.     Comments:      Pulmonary:      Effort: Pulmonary effort is normal. No respiratory distress.       Breath sounds: Normal breath sounds. No wheezing.   Musculoskeletal:         General: No swelling. Normal range of motion.   Skin:     Coloration: Skin is not pale.          Neurological:      Mental Status: He is alert and oriented to person, place, and time.      Motor: No weakness.   Psychiatric:         Mood and Affect: Mood normal.         Behavior: Behavior normal.         Thought Content: Thought content normal.        Accessory Clinical Findings:   Echocardiogram:  No results found for this or any previous visit.                Lab Results   Component Value Date    HBA1C 10 (A) 04/25/2022            j  [facility] (11/27/19):  Lp(a) normal, acr normal, gluc 171, remainder of CMP wnl, tsh wnl, konstantin/renin wnl, metaneph wnl  Wbc normal, hgb 17.6, normal plats, free cortisol wnl , vit D 12,   LDL-P 1282, small LDL-P956, high risk LDL-P size         [facility] labs 12/28/21  nonHDL-119, LDL-86, , gluc 149, Hgb/Hct 18/53.4         MPI 2011  1. No evidence of infarct or ischemia.  2. Left ventricular ejection fraction calculated at 60%.    Echo stress 2012   Normal resting echocardiogram.   Normal treadmill stress echocardiogram.   Hypertension at rest and with exercise    Echo 1/2020  Normal right and left ventricular size and function.   Left ventricular ejection fraction is visually estimated to be 60%.  Mildly increased septal wall thickness.  No significant valve disease or flow abnormalities.   Normal estimated right atrial pressure.   Unable to estimate pulmonary artery pressure due to an inadequate   tricuspid regurgitant jet.   No prior study is available for comparison.     CRISSY duplex 1/2020  No sonographic evidence of renal artery stenosis.    YULIYA Nicole.   [facility]   [facility]   [phone]     CC:  Dr.Raymond More

## 2023-08-09 ENCOUNTER — TELEPHONE (OUTPATIENT)
Dept: VASCULAR LAB | Facility: MEDICAL CENTER | Age: 63
End: 2023-08-09
Payer: MEDICAID

## 2023-08-14 ENCOUNTER — DOCUMENTATION (OUTPATIENT)
Dept: VASCULAR LAB | Facility: MEDICAL CENTER | Age: 63
End: 2023-08-14
Payer: MEDICAID

## 2023-08-23 ENCOUNTER — NON-PROVIDER VISIT (OUTPATIENT)
Dept: VASCULAR LAB | Facility: MEDICAL CENTER | Age: 63
End: 2023-08-23
Attending: INTERNAL MEDICINE
Payer: MEDICAID

## 2023-08-23 VITALS — DIASTOLIC BLOOD PRESSURE: 91 MMHG | SYSTOLIC BLOOD PRESSURE: 142 MMHG | HEART RATE: 71 BPM

## 2023-08-23 PROCEDURE — 99213 OFFICE O/P EST LOW 20 MIN: CPT

## 2023-08-23 RX ORDER — ASPIRIN 325 MG
325 TABLET ORAL DAILY
COMMUNITY
End: 2024-02-08

## 2023-08-23 RX ORDER — INSULIN GLARGINE 100 [IU]/ML
15 INJECTION, SOLUTION SUBCUTANEOUS EVERY EVENING
COMMUNITY
End: 2024-02-08

## 2023-08-23 NOTE — PROGRESS NOTES
Patient Consult Note    TIME IN: 9:20  TIME OUT: 10:20    Primary care physician: Stephen More M.D.    Reason for consult: Management of Uncontrolled Type 2 Diabetes    HPI:  Marcello Perez is a 63 y.o. old patient who comes in today for evaluation of above stated problem.    Most Recent HbA1c and POCT glucose:   Lab Results   Component Value Date/Time    HBA1C 10.7 (A) 06/19/2023 12:00 AM             Most Recent Scr:  Lab Results   Component Value Date/Time    CREATININE 0.83 03/30/2021 04:46 AM    CREATININE 0.9 06/18/2008 09:15 AM        Current Diabetes Medication Regimen  SGLT-2 Inhibitor + DPP-4 Inhibitor: Glyxambi 25-5mg      Basal Insulin: Lantus 13u/night - started early Aug     Previous Diabetes Medications and Reason for Discontinuation  R insulin -- changed to pen   N insulin -- changed to pen  Trulicity unknown dose, caused severe GI issues, significant bloating  Metformin XR 500mg, significant GI issues   Glyburide -- GI upset     SMBG  Pt has home glucometer and proper testing technique - yes  Discussed BG Goals: FBG 80 - 130, 2hPP < 180, A1c < 7%    Pt reports blood sugars:   Before Breakfast:   - before starting Lantus: 290-350  - after starting Lantus: 157-260     Reports taking ~2 hours after dinner.   - before Lantus: 350-450  - after Lantus: 200-250       Hypoglycemia  Hypoglycemia awareness - educated   Nocturnal hypoglycemia- none  Hypoglycemia:  None    Pt's treatment of Hypoglycemia - educated on 15:15  - 15:15 Rule    Lifestyle  Current Exercise - walk x60 min, 3d/week  Exercise Goal - 150 min/week, continue current regimen     Dietary - recently within the past couple days pt is making conscious effort to adjust diet (especially after seeing his BG at 403)  Breakfast - vegetables  Lunch - ground beef or chicken with vegetables  Dinner - ground beef or chicken with vegetables, pork ribs    Snacks - pumpkin seeds, almonds, walnuts   Drinks - green teas, black teas, rare sodas      Foot Exam:  Monofilament exam - sees podiatrist regularly     Preventative Management  BP regimen (ACE/ARB) - telmisartan 40mg   BP < 140/90 - yes  Statin - not currently on statin, rosuvastatin caused GI upset   LDL <100 -   Last Retinal Scan - due for screening, possibly gotten 2 months ago   Last Microalbumin/Cr - due, printed labs   Last A1c -   Lab Results   Component Value Date/Time    HBA1C 10.7 (A) 06/19/2023 12:00 AM        updated caregaps    Past Medical History:  Patient Active Problem List    Diagnosis Date Noted    Dyslipidemia 04/14/2021    Ptosis, left eyelid 03/29/2021    BARBARA (obstructive sleep apnea) 04/22/2015    Uncontrolled type 2 diabetes mellitus with hyperglycemia ([facility]) 04/22/2015    Resistant hypertension 04/06/2015    DM2 (diabetes mellitus, type 2) ([facility]) 04/06/2015       Past Surgical History:  Past Surgical History:   Procedure Laterality Date    NASAL SEPTAL RECONST         Allergies:  Hctz [hydrochlorothiazide]    Social History:  Social History     Socioeconomic History    Marital status: Single     Spouse name: Not on file    Number of children: Not on file    Years of education: Not on file    Highest education level: Not on file   Occupational History    Not on file   Tobacco Use    Smoking status: Never    Smokeless tobacco: Never   Substance and Sexual Activity    Alcohol use: Yes     Comment: occ    Drug use: No    Sexual activity: Not on file   Other Topics Concern    Not on file   Social History Narrative    Not on file     Social Determinants of Health     Financial Resource Strain: Not on file   Food Insecurity: Not on file   Transportation Needs: Not on file   Physical Activity: Not on file   Stress: Not on file   Social Connections: Not on file   Intimate Partner Violence: Not on file   Housing Stability: Not on file       Family History:  Family History   Problem Relation Age of Onset    Clotting Disorder Neg Hx     Stroke Neg Hx     Heart Disease Neg Hx         Medications:    Current Outpatient Medications:     aspirin (ASA) 325 MG Tab, Take 325 mg by mouth every day. Takes ~1 time per week, Disp: , Rfl:     insulin glargine (LANTUS SOLOSTAR) 100 UNIT/ML Solution Pen-injector injection, Inject 15 Units under the skin every evening., Disp: , Rfl:     dorzolamide-timolol (COSOPT) 22.3-6.8 MG/ML Solution, , Disp: , Rfl:     Cholecalciferol 1.25 MG (42931 UT) Tab, Take 1 Tablet by mouth 2 times a day., Disp: , Rfl:     telmisartan (MICARDIS) 40 MG Tab, Take 1 Tablet by mouth every day., Disp: 90 Tablet, Rfl: 1    chlorthalidone (HYGROTON) 25 MG Tab, Take 1 Tablet by mouth every day., Disp: 90 Tablet, Rfl: 1    meloxicam (MOBIC) 7.5 MG Tab, Take 7.5 mg by mouth 1 time a day as needed for Moderate Pain., Disp: , Rfl:     aspirin (ASA) 81 MG Chew Tab chewable tablet, Chew and swallow 1 tablet by mouth every day., Disp: 100 tablet, Rfl: 1    lactobacillus rhamnosus (CULTURELLE) Cap capsule, Take 1 capsule by mouth every day., Disp: , Rfl:     Empagliflozin-linaGLIPtin (GLYXAMBI) 25-5 MG Tab, Take 1 tablet by mouth every day., Disp: , Rfl:     NON SPECIFIED, Indications: Rejuvapor BCP & CBD 15mL total content, Disp: , Rfl:     Labs: Reviewed    Physical Examination:  Vital signs: BP (!) 142/91   Pulse 71  There is no height or weight on file to calculate BMI.    Assessment and Plan:    1. DM2  Basic physiology of DMII was explained to patient as well as microvascular/macrovascular complications. The importance of increasing physical activity to improve diabetes control was discussed with the patient. Patient was also educated on changing diet and making better choices to help control blood sugar.   Discussed Goals: FBG 80 - 130, 2hPP < 180, a1c < 7.0%   Last a1c drawn 6/19 is above goal at 10.7%  FBG and PPG have improved since starting Lantus but still above goal    Patient has tried multiple medications with GI difficulties, including metformin, glyburide, and Trulicity.  "Reports that symptoms of bloating, gas, and GI upset were significant and caused d/c. Also reports that Lantus causes some stomach upset after injection, and more than 20u Lantus results in \"eyes burning.\"     Patient is motivated to decrease blood sugars and has made significant changes to diet to focus on lean proteins, vegetables, and lacking carbohydrates. Patient is willing to increase lantus to 15 units/night. Discussed symptoms of hypoglycemia, blood glucose goals including hypoglycemia under 70, and the 15:15 rule for hypogylcemia.       - Medication changes:  Increase lantus to 15 units per night since pt states dose >20units causes his eyes to burn   - Continue:  Glyxambi 25-5 QD     - Lifestyle changes:  Continue with diet changes, low carbohydrates, continue exercise regimen and increase as tolerated.     Follow Up:  2 weeks    Latia Smith, pharmacy student   Oneyda Linn, PharmD    CC:   Stephen More M.D.    "

## 2023-09-15 ENCOUNTER — NON-PROVIDER VISIT (OUTPATIENT)
Dept: VASCULAR LAB | Facility: MEDICAL CENTER | Age: 63
End: 2023-09-15
Attending: INTERNAL MEDICINE
Payer: MEDICAID

## 2023-09-15 DIAGNOSIS — E11.65 UNCONTROLLED TYPE 2 DIABETES MELLITUS WITH HYPERGLYCEMIA (HCC): ICD-10-CM

## 2023-09-15 PROCEDURE — 99212 OFFICE O/P EST SF 10 MIN: CPT

## 2023-09-15 NOTE — PROGRESS NOTES
Patient Consult Note    TIME IN: 8:07  TIME OUT: 8:30    Primary care physician: Stephen More M.D.    Reason for consult: Management of Uncontrolled Type 2 Diabetes    HPI:  Marcello Perez is a 63 y.o. old patient who comes in today for evaluation of above stated problem.    Most Recent HbA1c and POCT glucose:   Lab Results   Component Value Date/Time    HBA1C 10.7 (A) 06/19/2023 12:00 AM             Most Recent Scr:  Lab Results   Component Value Date/Time    CREATININE 0.83 03/30/2021 04:46 AM    CREATININE 0.9 06/18/2008 09:15 AM        Current Diabetes Medication Regimen  SGLT-2 Inhibitor + DPP-4 Inhibitor: Glyxambi 25-5mg - patient stopped 2 weeks ago due to bloating    Basal Insulin: Lantus 15 u/night - started early Aug     Previous Diabetes Medications and Reason for Discontinuation  R insulin -- changed to pen   N insulin -- changed to pen  Trulicity unknown dose, caused severe GI issues, significant bloating  Metformin XR 500mg, significant GI issues   Glyburide -- GI upset     SMBG  Pt has home glucometer and proper testing technique - yes  Discussed BG Goals: FBG 80 - 130, 2hPP < 180, A1c < 7%    Pt reports blood sugars:   AM: 211, 224, 235, 235, 101  PM: 399, 193, 327, 227, 157, 227      Hypoglycemia  Hypoglycemia awareness - educated   Nocturnal hypoglycemia- none  Hypoglycemia:  None    Pt's treatment of Hypoglycemia - educated on 15:15  - 15:15 Rule    Lifestyle  Current Exercise - walk x60 min, 3d/week  Exercise Goal - 150 min/week, continue current regimen     Dietary - still working on improving diet  Breakfast - vegetables  Lunch - ground beef or chicken with vegetables  Dinner - ground beef or chicken with vegetables, pork ribs    Snacks - pumpkin seeds, almonds, walnuts   Drinks - green teas, black teas, rare sodas     Foot Exam:  Monofilament exam - sees podiatrist regularly     Preventative Management  BP regimen (ACE/ARB) - telmisartan 40mg   BP < 140/90 - yes  Statin - not  currently on statin, rosuvastatin caused GI upset   LDL <100 -   Last Retinal Scan - due for screening, possibly gotten 2 months ago   Last Microalbumin/Cr - due, printed labs   Last A1c -   Lab Results   Component Value Date/Time    HBA1C 10.7 (A) 06/19/2023 12:00 AM        updated caregaps    Past Medical History:  Patient Active Problem List    Diagnosis Date Noted    Dyslipidemia 04/14/2021    Ptosis, left eyelid 03/29/2021    BARBARA (obstructive sleep apnea) 04/22/2015    Uncontrolled type 2 diabetes mellitus with hyperglycemia ([facility]) 04/22/2015    Resistant hypertension 04/06/2015    DM2 (diabetes mellitus, type 2) ([facility]) 04/06/2015       Past Surgical History:  Past Surgical History:   Procedure Laterality Date    NASAL SEPTAL RECONST         Allergies:  Hctz [hydrochlorothiazide]    Social History:  Social History     Socioeconomic History    Marital status: Single     Spouse name: Not on file    Number of children: Not on file    Years of education: Not on file    Highest education level: Not on file   Occupational History    Not on file   Tobacco Use    Smoking status: Never    Smokeless tobacco: Never   Substance and Sexual Activity    Alcohol use: Yes     Comment: occ    Drug use: No    Sexual activity: Not on file   Other Topics Concern    Not on file   Social History Narrative    Not on file     Social Determinants of Health     Financial Resource Strain: Not on file   Food Insecurity: Not on file   Transportation Needs: Not on file   Physical Activity: Not on file   Stress: Not on file   Social Connections: Not on file   Intimate Partner Violence: Not on file   Housing Stability: Not on file       Family History:  Family History   Problem Relation Age of Onset    Clotting Disorder Neg Hx     Stroke Neg Hx     Heart Disease Neg Hx        Medications:    Current Outpatient Medications:     aspirin (ASA) 325 MG Tab, Take 325 mg by mouth every day. Takes ~1 time per week, Disp: , Rfl:     insulin glargine  (LANTUS SOLOSTAR) 100 UNIT/ML Solution Pen-injector injection, Inject 15 Units under the skin every evening., Disp: , Rfl:     dorzolamide-timolol (COSOPT) 22.3-6.8 MG/ML Solution, , Disp: , Rfl:     Cholecalciferol 1.25 MG (82853 UT) Tab, Take 1 Tablet by mouth 2 times a day., Disp: , Rfl:     telmisartan (MICARDIS) 40 MG Tab, Take 1 Tablet by mouth every day., Disp: 90 Tablet, Rfl: 1    chlorthalidone (HYGROTON) 25 MG Tab, Take 1 Tablet by mouth every day., Disp: 90 Tablet, Rfl: 1    meloxicam (MOBIC) 7.5 MG Tab, Take 7.5 mg by mouth 1 time a day as needed for Moderate Pain., Disp: , Rfl:     NON SPECIFIED, Indications: Rejuvapor BCP & CBD 15mL total content, Disp: , Rfl:     aspirin (ASA) 81 MG Chew Tab chewable tablet, Chew and swallow 1 tablet by mouth every day., Disp: 100 tablet, Rfl: 1    lactobacillus rhamnosus (CULTURELLE) Cap capsule, Take 1 capsule by mouth every day., Disp: , Rfl:     Empagliflozin-linaGLIPtin (GLYXAMBI) 25-5 MG Tab, Take 1 tablet by mouth every day., Disp: , Rfl:     Labs: Reviewed    Physical Examination:  Vital signs: There were no vitals taken for this visit. There is no height or weight on file to calculate BMI.    Assessment and Plan:    1. DM2   Discussed Goals: FBG 80 - 130, 2hPP < 180, a1c < 7.0%   Last a1c drawn 6/19 is above goal at 10.7%  FBG and PPG have improved since starting Lantus but still above goal  FBG was closer to goal with Glyxambi  Patient stopped Glyxambi due to stomach pain (BG increased after stopping) discussed with patient whether he is willing to retry - he will retry and see if stomach bloating occurs again. If he cannot tolerate glyxambi we will continue to titrate insulin.   Patient is motivated to continue to improve his diet - referral placed for dietician.  Will obtain A1c at next visit      - Medication changes:  Restart Glyxambi 25-5mg QD   - Continue:   lantus to 15 units per night since pt states dose >20units causes his eyes to burn     -  Lifestyle changes:  Continue with diet changes, low carbohydrates, continue exercise regimen and increase as tolerated.     Follow Up:  2 weeks    Lizz Dennis PharmD    CC:   Stephen More M.D.

## 2023-09-29 ENCOUNTER — TELEPHONE (OUTPATIENT)
Dept: VASCULAR LAB | Facility: MEDICAL CENTER | Age: 63
End: 2023-09-29

## 2023-09-29 ENCOUNTER — NON-PROVIDER VISIT (OUTPATIENT)
Dept: VASCULAR LAB | Facility: MEDICAL CENTER | Age: 63
End: 2023-09-29
Attending: INTERNAL MEDICINE
Payer: MEDICAID

## 2023-09-29 DIAGNOSIS — E11.9 TYPE 2 DIABETES MELLITUS WITHOUT COMPLICATION, UNSPECIFIED WHETHER LONG TERM INSULIN USE (HCC): ICD-10-CM

## 2023-09-29 LAB
HBA1C MFR BLD: 10.7 % (ref ?–5.8)
POCT INT CON NEG: NEGATIVE
POCT INT CON POS: POSITIVE

## 2023-09-29 PROCEDURE — 99212 OFFICE O/P EST SF 10 MIN: CPT

## 2023-09-29 PROCEDURE — 83036 HEMOGLOBIN GLYCOSYLATED A1C: CPT

## 2023-09-29 RX ORDER — SEMAGLUTIDE 0.68 MG/ML
0.25 INJECTION, SOLUTION SUBCUTANEOUS
Qty: 3 ML | Refills: 3 | Status: SHIPPED | OUTPATIENT
Start: 2023-09-29 | End: 2023-09-29

## 2023-09-29 RX ORDER — EMPAGLIFLOZIN 25 MG/1
25 TABLET, FILM COATED ORAL DAILY
Qty: 30 TABLET | Refills: 11 | Status: SHIPPED | OUTPATIENT
Start: 2023-09-29 | End: 2023-12-21

## 2023-09-29 RX ORDER — ORAL SEMAGLUTIDE 3 MG/1
3 TABLET ORAL DAILY
Qty: 30 TABLET | Refills: 0 | Status: SHIPPED | OUTPATIENT
Start: 2023-09-29 | End: 2023-12-21

## 2023-09-29 NOTE — PROGRESS NOTES
Patient Consult Note    Primary care physician: Stephen More M.D.    Reason for consult: Management of Uncontrolled Type 2 Diabetes    HPI:  Marcello Perze is a 63 y.o. old patient who comes in today for evaluation of above stated problem.    Most Recent HbA1c and POCT glucose:   Lab Results   Component Value Date/Time    HBA1C 10.7 (A) 09/29/2023 12:00 AM             Most Recent Scr:  Lab Results   Component Value Date/Time    CREATININE 0.83 03/30/2021 04:46 AM    CREATININE 0.9 06/18/2008 09:15 AM        Current Diabetes Medication Regimen  SGLT-2 Inhibitor + DPP-4 Inhibitor: Glyxambi 25-5mg     Basal Insulin: Lantus 15 u/night - started early Aug (Causes ocular burning sensation when he uses 20 units or greater.)    Previous Diabetes Medications and Reason for Discontinuation  R insulin -- changed to pen   N insulin -- changed to pen  Trulicity - unknown dose, caused severe GI issues, significant bloating, ankle swelling  Metformin XR 500mg, significant GI issues   Glyburide -- GI upset     SMBG  Pt has home glucometer and proper testing technique - yes  Discussed BG Goals: FBG 80 - 130, 2hPP < 180, A1c < 7%    Pt reports blood sugars:   AM: 101, 188, 269, 131, 209, 120, 143, 133  PM: 176, 172, 274, 258    Hypoglycemia  Hypoglycemia awareness - educated   Nocturnal hypoglycemia- none  Hypoglycemia:  None    Pt's treatment of Hypoglycemia - educated on 15:15  - 15:15 Rule    Lifestyle  Current Exercise - Walks 60 min thrice weekly  Exercise Goal - 150 min/week, continue current regimen     Dietary - Works to avoid carbs. Will be establishing w/ nutritionist next week.  Breakfast - vegetables  Lunch - ground beef or chicken with vegetables  Dinner - ground beef or chicken with vegetables, pork ribs    Snacks - pumpkin seeds, almonds, walnuts   Drinks - green teas, black teas, rare sodas     Foot Exam:  Monofilament exam - sees podiatrist regularly     Preventative Management  BP regimen (ACE/ARB) -  telmisartan 40mg once daily  Statin - not currently on statin, rosuvastatin caused GI upset   Last Retinal Scan - due for screening, possibly gotten 2 months ago   Last Microalbumin/Cr - Due. Orders in place.  Last A1c -   Lab Results   Component Value Date/Time    HBA1C 10.7 (A) 09/29/2023 12:00 AM          Past Medical History:  Patient Active Problem List    Diagnosis Date Noted    Dyslipidemia 04/14/2021    Ptosis, left eyelid 03/29/2021    BARBARA (obstructive sleep apnea) 04/22/2015    Uncontrolled type 2 diabetes mellitus with hyperglycemia ([facility]) 04/22/2015    Resistant hypertension 04/06/2015    DM2 (diabetes mellitus, type 2) ([facility]) 04/06/2015       Past Surgical History:  Past Surgical History:   Procedure Laterality Date    NASAL SEPTAL RECONST         Allergies:  Hctz [hydrochlorothiazide]    Social History:  Social History     Socioeconomic History    Marital status: Single     Spouse name: Not on file    Number of children: Not on file    Years of education: Not on file    Highest education level: Not on file   Occupational History    Not on file   Tobacco Use    Smoking status: Never    Smokeless tobacco: Never   Substance and Sexual Activity    Alcohol use: Yes     Comment: occ    Drug use: No    Sexual activity: Not on file   Other Topics Concern    Not on file   Social History Narrative    Not on file     Social Determinants of Health     Financial Resource Strain: Not on file   Food Insecurity: Not on file   Transportation Needs: Not on file   Physical Activity: Not on file   Stress: Not on file   Social Connections: Not on file   Intimate Partner Violence: Not on file   Housing Stability: Not on file       Family History:  Family History   Problem Relation Age of Onset    Clotting Disorder Neg Hx     Stroke Neg Hx     Heart Disease Neg Hx        Medications:    Current Outpatient Medications:     Empagliflozin (JARDIANCE) 25 MG Tab, Take 25 mg by mouth every day., Disp: 30 Tablet, Rfl: 11     Semaglutide (RYBELSUS) 3 MG Tab, Take 3 mg by mouth every day., Disp: 30 Tablet, Rfl: 0    aspirin (ASA) 325 MG Tab, Take 325 mg by mouth every day. Takes ~1 time per week, Disp: , Rfl:     insulin glargine (LANTUS SOLOSTAR) 100 UNIT/ML Solution Pen-injector injection, Inject 15 Units under the skin every evening., Disp: , Rfl:     dorzolamide-timolol (COSOPT) 22.3-6.8 MG/ML Solution, , Disp: , Rfl:     Cholecalciferol 1.25 MG (33047 UT) Tab, Take 1 Tablet by mouth 2 times a day., Disp: , Rfl:     telmisartan (MICARDIS) 40 MG Tab, Take 1 Tablet by mouth every day., Disp: 90 Tablet, Rfl: 1    chlorthalidone (HYGROTON) 25 MG Tab, Take 1 Tablet by mouth every day., Disp: 90 Tablet, Rfl: 1    meloxicam (MOBIC) 7.5 MG Tab, Take 7.5 mg by mouth 1 time a day as needed for Moderate Pain., Disp: , Rfl:     NON SPECIFIED, Indications: Rejuvapor BCP & CBD 15mL total content, Disp: , Rfl:     aspirin (ASA) 81 MG Chew Tab chewable tablet, Chew and swallow 1 tablet by mouth every day., Disp: 100 tablet, Rfl: 1    lactobacillus rhamnosus (CULTURELLE) Cap capsule, Take 1 capsule by mouth every day., Disp: , Rfl:     Labs: Reviewed    Physical Examination:  Vital signs: There were no vitals taken for this visit. There is no height or weight on file to calculate BMI.    Assessment and Plan:    1. DM2   Since last visit, pt was able to resume Glyxambi w/ no issues or noted SE.  Conducted repeat A1c today in clinic. It remained unchanged from 10.7%.  Pt reported BG are significantly above goal as well.  Pt amenable to trial of another GLP1 agent - Rybelsus.   Counseled him regarding MOA, SE, and administration.   Advised him to ensure he pre-emptively decreases  portion sizes.   He will DC immediately if he notices any sx similar to when he used Trulicity.  Pt expressed interested in getting off of all of his medications - advised him to continue to work on lifestyle measures. We will titrate off insulin first (if able) and then  re-assess from that point.    - Medication changes:  DC Glyxambi  START Jardiance 25 mg once daily  START Rybelsus 3 mg once daily  Continue Lantus 15 units QHS    - Lifestyle changes:  Continue with diet changes, low carbohydrates, continue exercise regimen and increase as tolerated.     Follow Up:  2 weeks    Isiah Cavazos, PharmD, BCACP    CC:   Stephen More M.D.

## 2023-09-29 NOTE — TELEPHONE ENCOUNTER
Received New start PA request via [employer]  for RYBELSUS 3MG TABLETS. (Quantity:30, Day Supply:30)     Insurance: [facility] MEDICAID  Member ID:  [id number]  BIN: [id number]  PCN: [id number]  Group: [id number]      Ran Test claim via [place] & medication Rejects stating prior authorization is required.    CELESTE Saenz, PhT  Pharmacy Liaison  P: [phone]  9/29/2023 1:11 PM

## 2023-10-02 NOTE — TELEPHONE ENCOUNTER
Prior Authorization for RYBELSUS 3MG TABLETS (Quantity: 30, Days: 30) has been submitted via Fax: [phone]    Insurance: [employer] [place] Medicaid     Attached chart notes.     Will follow up in 24-48 business hours.

## 2023-10-03 NOTE — TELEPHONE ENCOUNTER
Prior Authorization for Rybelsus 3mg tablets has been denied     Prior authorization was denied per the following: Patient must try and fail Trulicity and Victoza.    They have on file that patient has tried and failed Trulicity.    Insurance: [employer] [facility]     Next Steps:Forwarding Denial to liaison to inform provider for decision if medication should be changed or appeal started.

## 2023-10-10 NOTE — TELEPHONE ENCOUNTER
Called and spoke to Pt @[phone] with regards to Rybelsus PA denial and Jardiance. Per insurance, Pt is excluded to fill at the [facility]. Pt verified pharmacy of choice ---[facility]. This liaison will make a F/U with the provider regarding with the rybelsus for the next step or alternative. Will send Jardiance Rx to pt's preferred Pharmacy.     CELESTE Saenz, PhT  Pharmacy Liaison  P: [phone]  10/10/2023 12:10 PM

## 2023-10-13 ENCOUNTER — APPOINTMENT (OUTPATIENT)
Dept: VASCULAR LAB | Facility: MEDICAL CENTER | Age: 63
End: 2023-10-13
Payer: MEDICAID

## 2023-11-02 ENCOUNTER — TELEPHONE (OUTPATIENT)
Dept: VASCULAR LAB | Facility: MEDICAL CENTER | Age: 63
End: 2023-11-02
Payer: MEDICAID

## 2023-11-03 NOTE — TELEPHONE ENCOUNTER
[facility]    PA for Rybelsus was denied.  Pt has to try and fail trulicity and Victoza.  Pt has failed Trulicity, but pt feels that he may have tried Victoza as well.  He will call his previous provider in the past to get the name of the medication he has failed to see if it was Victoza.    Once we have this update, please provide information to Tila so she can process the PA or pharmacy is to start Victoza if pt has not had an adverse reaction to it in the past.    Alonso Agarwal, PharmD

## 2023-11-06 ENCOUNTER — TELEPHONE (OUTPATIENT)
Dept: VASCULAR LAB | Facility: MEDICAL CENTER | Age: 63
End: 2023-11-06
Payer: MEDICAID

## 2023-11-06 NOTE — TELEPHONE ENCOUNTER
[facility]    Pt still working on finding out if he has tried victoza in the past.  Pt will call the clinic once he gets his old list of medications.     Alonso Agarwal, TigreD

## 2023-12-21 ENCOUNTER — HOSPITAL ENCOUNTER (EMERGENCY)
Facility: MEDICAL CENTER | Age: 63
End: 2023-12-21
Attending: EMERGENCY MEDICINE
Payer: MEDICAID

## 2023-12-21 ENCOUNTER — TELEPHONE (OUTPATIENT)
Dept: VASCULAR LAB | Facility: MEDICAL CENTER | Age: 63
End: 2023-12-21
Payer: MEDICAID

## 2023-12-21 ENCOUNTER — APPOINTMENT (OUTPATIENT)
Dept: RADIOLOGY | Facility: MEDICAL CENTER | Age: 63
End: 2023-12-21
Attending: EMERGENCY MEDICINE
Payer: MEDICAID

## 2023-12-21 ENCOUNTER — OFFICE VISIT (OUTPATIENT)
Dept: VASCULAR LAB | Facility: MEDICAL CENTER | Age: 63
End: 2023-12-21
Attending: INTERNAL MEDICINE
Payer: MEDICAID

## 2023-12-21 VITALS
SYSTOLIC BLOOD PRESSURE: 123 MMHG | DIASTOLIC BLOOD PRESSURE: 85 MMHG | HEIGHT: 70 IN | WEIGHT: 182 LBS | HEART RATE: 94 BPM | BODY MASS INDEX: 26.05 KG/M2

## 2023-12-21 VITALS
BODY MASS INDEX: 26.07 KG/M2 | SYSTOLIC BLOOD PRESSURE: 130 MMHG | RESPIRATION RATE: 15 BRPM | WEIGHT: 182.1 LBS | TEMPERATURE: 98.1 F | HEIGHT: 70 IN | DIASTOLIC BLOOD PRESSURE: 87 MMHG | OXYGEN SATURATION: 94 % | HEART RATE: 95 BPM

## 2023-12-21 DIAGNOSIS — H54.61 VISION LOSS OF RIGHT EYE: ICD-10-CM

## 2023-12-21 DIAGNOSIS — E11.9 TYPE 2 DIABETES MELLITUS WITHOUT COMPLICATION, UNSPECIFIED WHETHER LONG TERM INSULIN USE (HCC): ICD-10-CM

## 2023-12-21 DIAGNOSIS — I1A.0 RESISTANT HYPERTENSION: ICD-10-CM

## 2023-12-21 DIAGNOSIS — E78.5 DYSLIPIDEMIA: ICD-10-CM

## 2023-12-21 DIAGNOSIS — E11.65 UNCONTROLLED TYPE 2 DIABETES MELLITUS WITH HYPERGLYCEMIA (HCC): ICD-10-CM

## 2023-12-21 LAB
ALBUMIN SERPL BCP-MCNC: 4.4 G/DL (ref 3.2–4.9)
ALBUMIN/GLOB SERPL: 1.2 G/DL
ALP SERPL-CCNC: 113 U/L (ref 30–99)
ALT SERPL-CCNC: 35 U/L (ref 2–50)
ANION GAP SERPL CALC-SCNC: 16 MMOL/L (ref 7–16)
APTT PPP: 26.9 SEC (ref 24.7–36)
AST SERPL-CCNC: 27 U/L (ref 12–45)
BASOPHILS # BLD AUTO: 1.5 % (ref 0–1.8)
BASOPHILS # BLD: 0.11 K/UL (ref 0–0.12)
BILIRUB SERPL-MCNC: 0.4 MG/DL (ref 0.1–1.5)
BUN SERPL-MCNC: 28 MG/DL (ref 8–22)
CALCIUM ALBUM COR SERPL-MCNC: 9.6 MG/DL (ref 8.5–10.5)
CALCIUM SERPL-MCNC: 9.9 MG/DL (ref 8.5–10.5)
CHLORIDE SERPL-SCNC: 99 MMOL/L (ref 96–112)
CO2 SERPL-SCNC: 26 MMOL/L (ref 20–33)
CREAT SERPL-MCNC: 0.96 MG/DL (ref 0.5–1.4)
EKG IMPRESSION: NORMAL
EOSINOPHIL # BLD AUTO: 0.18 K/UL (ref 0–0.51)
EOSINOPHIL NFR BLD: 2.5 % (ref 0–6.9)
ERYTHROCYTE [DISTWIDTH] IN BLOOD BY AUTOMATED COUNT: 42.4 FL (ref 35.9–50)
GFR SERPLBLD CREATININE-BSD FMLA CKD-EPI: 88 ML/MIN/1.73 M 2
GLOBULIN SER CALC-MCNC: 3.6 G/DL (ref 1.9–3.5)
GLUCOSE SERPL-MCNC: 220 MG/DL (ref 65–99)
HCT VFR BLD AUTO: 52.1 % (ref 42–52)
HGB BLD-MCNC: 17.3 G/DL (ref 14–18)
IMM GRANULOCYTES # BLD AUTO: 0.02 K/UL (ref 0–0.11)
IMM GRANULOCYTES NFR BLD AUTO: 0.3 % (ref 0–0.9)
INR PPP: 1.04 (ref 0.87–1.13)
LIPASE SERPL-CCNC: 147 U/L (ref 11–82)
LYMPHOCYTES # BLD AUTO: 1.37 K/UL (ref 1–4.8)
LYMPHOCYTES NFR BLD: 19.1 % (ref 22–41)
MCH RBC QN AUTO: 28.1 PG (ref 27–33)
MCHC RBC AUTO-ENTMCNC: 33.2 G/DL (ref 32.3–36.5)
MCV RBC AUTO: 84.7 FL (ref 81.4–97.8)
MONOCYTES # BLD AUTO: 0.5 K/UL (ref 0–0.85)
MONOCYTES NFR BLD AUTO: 7 % (ref 0–13.4)
NEUTROPHILS # BLD AUTO: 4.99 K/UL (ref 1.82–7.42)
NEUTROPHILS NFR BLD: 69.6 % (ref 44–72)
NRBC # BLD AUTO: 0 K/UL
NRBC BLD-RTO: 0 /100 WBC (ref 0–0.2)
PLATELET # BLD AUTO: 232 K/UL (ref 164–446)
PMV BLD AUTO: 10.8 FL (ref 9–12.9)
POTASSIUM SERPL-SCNC: 3.6 MMOL/L (ref 3.6–5.5)
PROT SERPL-MCNC: 8 G/DL (ref 6–8.2)
PROTHROMBIN TIME: 13.7 SEC (ref 12–14.6)
RBC # BLD AUTO: 6.15 M/UL (ref 4.7–6.1)
SODIUM SERPL-SCNC: 141 MMOL/L (ref 135–145)
TROPONIN T SERPL-MCNC: 14 NG/L (ref 6–19)
WBC # BLD AUTO: 7.2 K/UL (ref 4.8–10.8)

## 2023-12-21 PROCEDURE — 83690 ASSAY OF LIPASE: CPT

## 2023-12-21 PROCEDURE — 93005 ELECTROCARDIOGRAM TRACING: CPT

## 2023-12-21 PROCEDURE — 3079F DIAST BP 80-89 MM HG: CPT | Performed by: INTERNAL MEDICINE

## 2023-12-21 PROCEDURE — 85610 PROTHROMBIN TIME: CPT

## 2023-12-21 PROCEDURE — 36415 COLL VENOUS BLD VENIPUNCTURE: CPT

## 2023-12-21 PROCEDURE — 85730 THROMBOPLASTIN TIME PARTIAL: CPT

## 2023-12-21 PROCEDURE — 700105 HCHG RX REV CODE 258: Mod: UD | Performed by: EMERGENCY MEDICINE

## 2023-12-21 PROCEDURE — 3074F SYST BP LT 130 MM HG: CPT | Performed by: INTERNAL MEDICINE

## 2023-12-21 PROCEDURE — 80053 COMPREHEN METABOLIC PANEL: CPT

## 2023-12-21 PROCEDURE — 99215 OFFICE O/P EST HI 40 MIN: CPT | Performed by: INTERNAL MEDICINE

## 2023-12-21 PROCEDURE — 99284 EMERGENCY DEPT VISIT MOD MDM: CPT

## 2023-12-21 PROCEDURE — 700101 HCHG RX REV CODE 250: Mod: UD | Performed by: EMERGENCY MEDICINE

## 2023-12-21 PROCEDURE — 84484 ASSAY OF TROPONIN QUANT: CPT

## 2023-12-21 PROCEDURE — 93005 ELECTROCARDIOGRAM TRACING: CPT | Performed by: EMERGENCY MEDICINE

## 2023-12-21 PROCEDURE — 99212 OFFICE O/P EST SF 10 MIN: CPT

## 2023-12-21 PROCEDURE — 85025 COMPLETE CBC W/AUTO DIFF WBC: CPT

## 2023-12-21 PROCEDURE — 71045 X-RAY EXAM CHEST 1 VIEW: CPT

## 2023-12-21 RX ORDER — SODIUM CHLORIDE 9 MG/ML
1000 INJECTION, SOLUTION INTRAVENOUS ONCE
Status: COMPLETED | OUTPATIENT
Start: 2023-12-21 | End: 2023-12-21

## 2023-12-21 RX ORDER — PIOGLITAZONEHYDROCHLORIDE 30 MG/1
30 TABLET ORAL DAILY
Qty: 30 TABLET | Refills: 11 | Status: SHIPPED | OUTPATIENT
Start: 2023-12-21 | End: 2024-02-08

## 2023-12-21 RX ORDER — EMPAGLIFLOZIN AND LINAGLIPTIN 25; 5 MG/1; MG/1
1 TABLET, FILM COATED ORAL DAILY
Qty: 30 TABLET | Refills: 11
Start: 2023-12-21

## 2023-12-21 RX ORDER — PROPARACAINE HYDROCHLORIDE 5 MG/ML
1 SOLUTION/ DROPS OPHTHALMIC ONCE
Status: COMPLETED | OUTPATIENT
Start: 2023-12-21 | End: 2023-12-21

## 2023-12-21 RX ORDER — ATORVASTATIN CALCIUM 40 MG/1
40 TABLET, FILM COATED ORAL NIGHTLY
Qty: 30 TABLET | Refills: 11 | Status: SHIPPED | OUTPATIENT
Start: 2023-12-21

## 2023-12-21 RX ORDER — SPIRONOLACTONE 25 MG/1
25 TABLET ORAL DAILY
Qty: 30 TABLET | Refills: 3 | Status: SHIPPED | OUTPATIENT
Start: 2023-12-21

## 2023-12-21 RX ADMIN — PROPARACAINE HYDROCHLORIDE 1 DROP: 5 SOLUTION/ DROPS OPHTHALMIC at 19:30

## 2023-12-21 RX ADMIN — SODIUM CHLORIDE 1000 ML: 9 INJECTION, SOLUTION INTRAVENOUS at 19:13

## 2023-12-21 RX ADMIN — FLUORESCEIN SODIUM 1 MG: 1 STRIP OPHTHALMIC at 19:45

## 2023-12-21 ASSESSMENT — LIFESTYLE VARIABLES: DO YOU DRINK ALCOHOL: NO

## 2023-12-21 NOTE — PATIENT INSTRUCTIONS
CHOLESTEROL  - start atorvastatin 40 mg daily (take it with food)    BLOOD PRESSURE  - continue telmisartan 40 mg daily  - stop chlorthalidone   - start spironolactone 25 mg daily     DIABETES  - continue glyxambi  - continue current lantus dose  - add pioglitizone     Get fasting blood work one week before follow up visits.     Follow Up;  1) diabetes pharmacists feb 8 at 1030  2) nurse practioner Feb 8 at 840a    Michael Bloch, MD  [facility]  [phone]

## 2023-12-21 NOTE — TELEPHONE ENCOUNTER
Received Refill PA request via [employer]  for GLYXAMBI 25-5 MG TABLETS. (Quantity:90, Day Supply:90)     Insurance: [employer]  Member ID:  [id number]  BIN: [id number]  PCN: [id number]  Group: [id number]     Ran Test claim via [place] & medication  RTS 01/02/24. Pt has been filling through [facility]. Will release Rx.     CELESTE Saenz, PhT  Pharmacy Liaison (Rx Coordinator)  P: [phone]  12/21/2023 11:44 AM

## 2023-12-21 NOTE — PROGRESS NOTES
RESISTANT HTN CLINIC- FOLLOW-UP   12/21/23    Here for vascular med follow up    Assessment / Plan:       1. Blood Pressure Control:  Qualifies as resistant HTN:  No - good control <3 meds   Office BP Goal ACC/AHA (2017) goal <130/80  Home BP at goal:  Yes 120-130/70s  Office BP at goal:  no mildly elevated diastolic today  24h ABPM (12/2019): Reasonable data acquisition. Mean daytime: 117/85 consistent with suboptimally controlled out of office diastolic blood pressure. Nocturnal dip: Appears somewhat blunted  Plan:    Monitoring:   - continue home BP monitoring, reviewed correct technique:  Yes   - monitor lytes/gfr routinely   - consider repeat abpm in future    2. Work up of Secondary Causes of Resistant Hypertension:   Renovascular HTN: Excluded  Primary Aldosteronism: Excluded  Thyroid Function: Excluded  Obstructive Sleep Apnea:  no longer on CPAP; states he sleeps well w/o snoring or daytime somnolence  Pheochromocytoma: Excluded   Cushing's:   Excluded   Other:   Recommendations At This Visit: none         3. Medication Use / Adherence:  Assessment: Complete  Recommendations: Instructed to Continue Taking All Medications As Prescribed         4. End Organ Damage:   Left Ventricular Hypertrophy: Absent on  Echocardiogram Date: 1/2020  Albuminuria: None   Renal Function: Chronic Kidney Disease   G2 at worst  but no recent labs  Established Cardiovascular Disease: None    5. Lifestyle Recommendations:    Smoking: pt continues vaping BCP products at the direction of Dr. Tirado for his glaucoma.  He understands that vaping is at his own risk.    Physical Activity: continue healthy activity to improve CV fitness including daily walking     Weight Management and Nutrition: Dietary plan was discussed with patient at this visit including DASH, low sodium    6. Standard HTN Pharmacotherapy:  ACEI/ARB: continue telmisartan 40mg  Thiazide Type Diuretic: change chlorthalidone to quinton given possible gout  Calcium  Channel Blocker (CCB): continue to hold at present. Has had leg swelling in past    7. Additional Agents:  Not currently indicated     8. Other CV Risk Factors:     LIPID MANAGEMENT:  Guidlelines recommend at least mod-intensity statin  Currently on statin: Yes  Lp(a) normal   ? H/o myalgias  Not currently on statin or ezetimibe  - focus on LDL-P reduction and apoB due to discordance to LDL-C   Tx goal: non-HDL-C <100,  LDL-C <70  (optional: apoB <80)   At goal: unknown  Plan:  - reinforced ongoing TLC measures   - restart atorva 40 mg daily  - continue vitamin D3 5,000 units   - recheck fasting lipid panel  - intensify therapy if above goal    GLYCEMIA MANAGEMENT:  Diabetic, T2, ophthalmic complications non-insulin   Lab Results   Component Value Date    HBA1C 10.7 (A) 09/29/2023    Goal A1c < 7.5, optimal <7.0  ACR: A1  Poor control in past based on a1c  No hypoglycemia on current lantus  Poor tolerance of victoza in past  Has had some difficulty with access to medications  Plan:  - continue glyxambi  - continue current dose of lantus for now, but hopefully can taper off in future  - start cathi 30 mg daily - watch for leg swelling  - repeat a2c prior to next visit  - f/u in dm pharmacotherapy clinic    ANTIPLATELET/ANTICOAGULANT THERAPY: yes   - cont asa 81mg daily    9. Other Issues:    1) DM retinopathy, proliferative and glaucoma that may require surgery    - continue care with ophthalmo for injections routinely as well as homeopath f/u- defer to other specialties    2) BARBARA- off cpap.  Pt states he was getting constant headaches when he used CPAP; that has resolved and his sleep improved once he stopped.  - defer mgmt to sleep MD      3) vit D deficiency- stable 7/2021 (vit D 52)  - continue Vit D3 5,000 units daily     4) Lt eye palsy  - continue f/u with Dr. Pineda and retinal specialists      Studies Ordered At This Visit:  None  Blood Work to Be Obtained Prior to Next Visit:  Per below  Disposition: 6  weeks with vascular APN and dm pharmacoatherapy    Diagnosis:  1. Type 2 diabetes mellitus without complication, unspecified whether long term insulin use (HCC)  Empagliflozin-linaGLIPtin (GLYXAMBI) 25-5 MG Tab    pioglitazone (ACTOS) 30 MG Tab      2. Uncontrolled type 2 diabetes mellitus with hyperglycemia (HCC)  HEMOGLOBIN A1C    MICROALBUMIN CREAT RATIO URINE      3. Resistant hypertension  spironolactone (ALDACTONE) 25 MG Tab    Comp Metabolic Panel    MICROALBUMIN CREAT RATIO URINE      4. Dyslipidemia  atorvastatin (LIPITOR) 40 MG Tab    Comp Metabolic Panel    Lipid Profile    TSH          Subjective     History of Present Illness:     Here for follow-up of dyslipidemia, hypertension, diabetes    Lipids  Was unable to tolerate ezetimibe or rosuvastatin due to GI upset  He does not recall taking atorvastatin in the past although it was ordered.  He thinks maybe he did not pick it up  Denies any known history of myalgias    Blood pressure  He has not been measuring his blood pressure at home  He does describe good adherence with telmisartan and chlorthalidone  Denies interfering substances  Has been undergoing a workup for gout  Has longstanding ED  Has history of leg swelling with amlodipine in the past    Diabetes  Now on Glyxambi  Remains on Lantus 15 units a day  Good adherence  No lows  Fingersticks have improved to usually in the 120s to 130s fasting  He did not tolerate Victoza in the past  He saw diabetes pharmacotherapy once but has not followed up  Does have difficulty with getting affordable diabetes medicines          Social History:     Social History     Tobacco Use    Smoking status: Never    Smokeless tobacco: Never   Substance Use Topics    Alcohol use: Yes     Comment: occ    Drug use: No   Exercise - walks four miles 2x per week  Diet - seeing nutritionist - portion control and low carb  Weight - up and down     Objective:       Vitals:    12/21/23 0954   BP: 123/85   BP Location: Left arm  "  Patient Position: Sitting   BP Cuff Size: Adult   Pulse: 94   Weight: 82.6 kg (182 lb)   Height: 1.778 m (5' 10\")       Body mass index is 26.11 kg/m².    BP:   BP Readings from Last 5 Encounters:   12/21/23 130/87   12/21/23 123/85   08/23/23 (!) 142/91   07/25/23 123/85   02/08/22 151/97      Physical Exam  Vitals reviewed.   Constitutional:       General: He is not in acute distress.     Appearance: He is not diaphoretic.   HENT:      Head: Normocephalic and atraumatic.   Eyes:      General: No scleral icterus.     Conjunctiva/sclera: Conjunctivae normal.   Neck:      Vascular: No carotid bruit.   Cardiovascular:      Rate and Rhythm: Normal rate and regular rhythm.      Heart sounds: Normal heart sounds. No murmur heard.  Pulmonary:      Effort: Pulmonary effort is normal. No respiratory distress.      Breath sounds: Normal breath sounds. No wheezing or rales.   Musculoskeletal:      Right lower leg: No edema.      Left lower leg: No edema.   Skin:     Coloration: Skin is not pale.   Neurological:      General: No focal deficit present.      Mental Status: He is alert and oriented to person, place, and time.      Cranial Nerves: No cranial nerve deficit.      Coordination: Coordination normal.      Gait: Gait is intact. Gait normal.   Psychiatric:         Mood and Affect: Mood and affect normal.         Behavior: Behavior normal.          Accessory Clinical Findings:     Lab Results   Component Value Date/Time    HBA1C 10.7 (A) 09/29/2023 12:00 AM       Lab Results   Component Value Date    SODIUM 141 12/21/2023    POTASSIUM 3.6 12/21/2023    CHLORIDE 99 12/21/2023    CO2 26 12/21/2023    GLUCOSE 220 (H) 12/21/2023    BUN 28 (H) 12/21/2023    CREATININE 0.96 12/21/2023      j  [facility] (11/27/19):  Lp(a) normal, acr normal, gluc 171, remainder of CMP wnl, tsh wnl, konstantin/renin wnl, metaneph wnl  Wbc normal, hgb 17.6, normal plats, free cortisol wnl , vit D 12,   LDL-P 1282, small LDL-P956, high risk LDL-P size     "     [facility] labs 12/28/21  nonHDL-119, LDL-86, , gluc 149, Hgb/Hct 18/53.4      MPI 2011  1. No evidence of infarct or ischemia.  2. Left ventricular ejection fraction calculated at 60%.    Echo stress 2012   Normal resting echocardiogram.   Normal treadmill stress echocardiogram.   Hypertension at rest and with exercise    Echo 1/2020  Normal right and left ventricular size and function.   Left ventricular ejection fraction is visually estimated to be 60%.  Mildly increased septal wall thickness.  No significant valve disease or flow abnormalities.   Normal estimated right atrial pressure.   Unable to estimate pulmonary artery pressure due to an inadequate   tricuspid regurgitant jet.   No prior study is available for comparison.     CRISSY duplex 1/2020  No sonographic evidence of renal artery stenosis.    Echocardiogram March 2021  Compared to the images of the study done 01- there has been no changes.  Left ventricular ejection fraction is visually estimated to be 65%.  Normal regional wall motion.  No significant valvular disease noted  No evidence of right to left shunt by agitated saline challenge.    Total time: 49 min - chart review/prep, review of other providers' records, imaging/lab review, face-to-face time for history/examination, ordering, prescribing,  review of results/meds/ treatment plan with patient/family/caregiver, documentation in EMR, care coordination (as needed)     Michael J Bloch, M.D.   [facility]   [facility]   [phone]     CC:  Dr.Raymond More

## 2023-12-22 NOTE — ED NOTES
Pt updated with POC. Pt resting with even chest rise and fall, reports no needs at this time, call light available and in reach.

## 2023-12-22 NOTE — ED NOTES
Visual acuity:   Both eyes: 20/50  Left eye: 20/50  Right eye: Pt states they are unable to see the visual acuity chart at all.

## 2023-12-22 NOTE — ED NOTES
PIV removed. Patient given discharge instructions and verbalizes understanding. Left with all belongings and ambulates to ED lobby with steady gait.

## 2023-12-22 NOTE — ED TRIAGE NOTES
"Chief Complaint   Patient presents with    Sent by MD     Patient reports he was sent by MD for vision loss in R eye and bleeding from R eye. Patient also sent for elevated HR.    Loss of Vision     Patient reports loss of vision from R eye 3 days ago with some bleeding from R eye noted       64 yo male to triage for above complaint.   EKG complete. Patient denies chest pain or SOB at this time.      Pt is alert and oriented, speaking in full sentences, follows commands and responds appropriately to questions.     Patient placed back in lobby and educated on triage process. Asked to inform RN of any changes.    BP (!) 137/100   Pulse (!) 117   Temp 36.6 °C (97.8 °F) (Temporal)   Resp 18   Ht 1.778 m (5' 10\")   Wt 82.6 kg (182 lb 1.6 oz)   SpO2 98%   BMI 26.13 kg/m²     "

## 2023-12-22 NOTE — ED NOTES
Bedside report received from off going RN Rani, assumed care of patient.  POC discussed with patient. Call light within reach, all needs addressed at this time.       Fall risk interventions in place: Keep floor surfaces clean and dry and Accompanied to restroom (all applicable per [facility] Fall risk assessment)   Continuous monitoring: Pulse Ox or Blood Pressure  IVF/IV medications: NS  Oxygen: Room Air  Bedside sitter: Not Applicable   Isolation: Not Applicable

## 2023-12-22 NOTE — ED PROVIDER NOTES
"ED Provider Note    CHIEF COMPLAINT  Chief Complaint   Patient presents with    Sent by MD     Patient reports he was sent by MD for vision loss in R eye and bleeding from R eye. Patient also sent for elevated HR.    Loss of Vision     Patient reports loss of vision from R eye 3 days ago with some bleeding from R eye noted       EXTERNAL RECORDS REVIEWED  Outpatient Notes Office visit note from earlier today    HPI/ROS  LIMITATION TO HISTORY   Select: : None  OUTSIDE HISTORIAN(S):  None    Marcello Perez is a 63 y.o. male who presents to the emergency department for evaluation of vision loss.  The patient states that he was following up at his scheduled appointments today with his hypertension doctor as well as his primary care physician.  He states that he did have some medication changes but has not taken them yet.  He states that he went to his primary care and was sent here for vision loss.  The patient states that he \"bled into his right eye\" 3 days ago.  He states that he has a history of these bleeds and has had to have injections as well as laser surgery.  He states that he went to his primary care to get a referral to a new retinologist as his insurance no longer covers his previous doctor.  He states that he does have some eye pain occasionally.  He states that he is able to see shapes but cannot read out of that eye.  He states that occasionally he feels lightheaded but denies any chest pain or shortness of breath.  He states that sometimes he has upper abdominal pain but denies current pain.  He has not had any nausea or vomiting.  He denies focal numbness or weakness.    PAST MEDICAL HISTORY   has a past medical history of Bell's palsy, Diabetes, Hypertension, and Sleep apnea.    SURGICAL HISTORY   has a past surgical history that includes nasal septal reconst.    FAMILY HISTORY  Family History   Problem Relation Age of Onset    Clotting Disorder Neg Hx     Stroke Neg Hx     Heart Disease Neg Hx " "       SOCIAL HISTORY  Social History     Tobacco Use    Smoking status: Never    Smokeless tobacco: Never   Substance and Sexual Activity    Alcohol use: Yes     Comment: occ    Drug use: No    Sexual activity: Not on file       CURRENT MEDICATIONS  Home Medications       Reviewed by Adrianne Beltran R.N. (Registered Nurse) on 12/21/23 at 1727  Med List Status: Partial     Medication Last Dose Status   aspirin (ASA) 325 MG Tab  Active   aspirin (ASA) 81 MG Chew Tab chewable tablet  Active   atorvastatin (LIPITOR) 40 MG Tab  Active   Cholecalciferol 1.25 MG (35338 UT) Tab  Active   dorzolamide-timolol (COSOPT) 22.3-6.8 MG/ML Solution  Active   Empagliflozin-linaGLIPtin (GLYXAMBI) 25-5 MG Tab  Active   insulin glargine (LANTUS SOLOSTAR) 100 UNIT/ML Solution Pen-injector injection  Active   lactobacillus rhamnosus (CULTURELLE) Cap capsule  Active   meloxicam (MOBIC) 7.5 MG Tab  Active   NON SPECIFIED  Active   pioglitazone (ACTOS) 30 MG Tab  Active   spironolactone (ALDACTONE) 25 MG Tab  Active   telmisartan (MICARDIS) 40 MG Tab  Active                  ALLERGIES  Allergies   Allergen Reactions    Glyburide      GI upset    Hctz [Hydrochlorothiazide]      Rash on throat when exposed to excess sun    Lantus [Insulin Glargine]      Reports \"eyes burning\" if dose is greater than 20 units qhs    Metformin      Significant GI upset with IR and ER formulation    Trulicity [Dulaglutide]      Significant gas, bloating, GI upset     PHYSICAL EXAM  VITAL SIGNS: /87   Pulse 95   Temp 36.7 °C (98.1 °F) (Temporal)   Resp 15   Ht 1.778 m (5' 10\")   Wt 82.6 kg (182 lb 1.6 oz)   SpO2 94%   BMI 26.13 kg/m²   Constitutional: Alert and in no apparent distress.  HENT: Normocephalic atraumatic. Bilateral external ears normal. Nose normal. Mucous membranes are moist.  Eyes: Visual acuity was 20/50 in the left eye.  The patient was unable to visualize the eye chart with the right eye.  He has 20/50 site with bilateral eyes.  " Pupils are equal and reactive. Conjunctiva normal. Non-icteric sclera.  Extraocular muscles are intact.  No proptosis is noted.  No periorbital edema or erythema noted.  Pressures were checked in the right eye and elevated at 30, 31, 32.  Pressures in the left eye were 22, 19, 20.  No fluorescein uptake.  Negative Sony sign.    Neck: Normal range of motion without tenderness. Supple. No meningeal signs.  Cardiovascular: Tachycardic rate and regular rhythm. No murmurs, gallops or rubs.  Thorax & Lungs: No increased work of breathing. Breath sounds are clear to auscultation bilaterally. No wheezing, rhonchi or rales.  Abdomen: Soft, nontender and nondistended. No hepatosplenomegaly.  Skin: Warm and dry. No rashes are noted.  Back: No bony tenderness, No CVA tenderness.   Extremities: 2+ peripheral pulses. Cap refill is less than 2 seconds. No edema, cyanosis, or clubbing.  Musculoskeletal: Good range of motion in all major joints. No tenderness to palpation or major deformities noted.   Neurologic: Alert and appropriate for age. The patient moves all 4 extremities without obvious deficits.    DIAGNOSTIC STUDIES / PROCEDURES  EKG  I have independently interpreted this EKG  Results for orders placed or performed during the hospital encounter of 23   EKG   Result Value Ref Range    Report       [facility] Emergency Dept.    Test Date:  2023  Pt Name:    JEAN CARLOS MIKE             Department: ER  MRN:        [id number]                      Room:  Gender:     Male                         Technician: [id number]  :        1960                   Requested By:ER TRIAGE PROTOCOL  Order #:    [id number]                    Reading MD: Kami Clarke    Measurements  Intervals                                Axis  Rate:       105                          P:          46  WY:         181                          QRS:        53  QRSD:       88                           T:          13  QT:          360  QTc:        476    Interpretive Statements  Sinus tachycardia  Borderline prolonged QT interval  Compared to ECG 03/29/2021 18:38:29  T-wave abnormality no longer present  Electronically Signed On 12- 19:00:38 PST by Kami Clarke       LABS  Results for orders placed or performed during the hospital encounter of 12/21/23   CBC WITH DIFFERENTIAL   Result Value Ref Range    WBC 7.2 4.8 - 10.8 K/uL    RBC 6.15 (H) 4.70 - 6.10 M/uL    Hemoglobin 17.3 14.0 - 18.0 g/dL    Hematocrit 52.1 (H) 42.0 - 52.0 %    MCV 84.7 81.4 - 97.8 fL    MCH 28.1 27.0 - 33.0 pg    MCHC 33.2 32.3 - 36.5 g/dL    RDW 42.4 35.9 - 50.0 fL    Platelet Count 232 164 - 446 K/uL    MPV 10.8 9.0 - 12.9 fL    Neutrophils-Polys 69.60 44.00 - 72.00 %    Lymphocytes 19.10 (L) 22.00 - 41.00 %    Monocytes 7.00 0.00 - 13.40 %    Eosinophils 2.50 0.00 - 6.90 %    Basophils 1.50 0.00 - 1.80 %    Immature Granulocytes 0.30 0.00 - 0.90 %    Nucleated RBC 0.00 0.00 - 0.20 /100 WBC    Neutrophils (Absolute) 4.99 1.82 - 7.42 K/uL    Lymphs (Absolute) 1.37 1.00 - 4.80 K/uL    Monos (Absolute) 0.50 0.00 - 0.85 K/uL    Eos (Absolute) 0.18 0.00 - 0.51 K/uL    Baso (Absolute) 0.11 0.00 - 0.12 K/uL    Immature Granulocytes (abs) 0.02 0.00 - 0.11 K/uL    NRBC (Absolute) 0.00 K/uL   COMP METABOLIC PANEL   Result Value Ref Range    Sodium 141 135 - 145 mmol/L    Potassium 3.6 3.6 - 5.5 mmol/L    Chloride 99 96 - 112 mmol/L    Co2 26 20 - 33 mmol/L    Anion Gap 16.0 7.0 - 16.0    Glucose 220 (H) 65 - 99 mg/dL    Bun 28 (H) 8 - 22 mg/dL    Creatinine 0.96 0.50 - 1.40 mg/dL    Calcium 9.9 8.5 - 10.5 mg/dL    Correct Calcium 9.6 8.5 - 10.5 mg/dL    AST(SGOT) 27 12 - 45 U/L    ALT(SGPT) 35 2 - 50 U/L    Alkaline Phosphatase 113 (H) 30 - 99 U/L    Total Bilirubin 0.4 0.1 - 1.5 mg/dL    Albumin 4.4 3.2 - 4.9 g/dL    Total Protein 8.0 6.0 - 8.2 g/dL    Globulin 3.6 (H) 1.9 - 3.5 g/dL    A-G Ratio 1.2 g/dL   LIPASE   Result Value Ref Range    Lipase 147 (H) 11 - 82 U/L    TROPONIN   Result Value Ref Range    Troponin T 14 6 - 19 ng/L   PROTHROMBIN TIME (INR)   Result Value Ref Range    PT 13.7 12.0 - 14.6 sec    INR 1.04 0.87 - 1.13   APTT   Result Value Ref Range    APTT 26.9 24.7 - 36.0 sec   ESTIMATED GFR   Result Value Ref Range    GFR (CKD-EPI) 88 >60 mL/min/1.73 m 2   EKG   Result Value Ref Range    Report       [facility] Emergency Dept.    Test Date:  2023  Pt Name:    JEAN CARLOS MIKE             Department: ER  MRN:        [id number]                      Room:  Gender:     Male                         Technician: [id number]  :        1960                   Requested By:ER TRIAGE PROTOCOL  Order #:    [id number]                    Reading MD: Kami Clarke    Measurements  Intervals                                Axis  Rate:       105                          P:          46  MO:         181                          QRS:        53  QRSD:       88                           T:          13  QT:         360  QTc:        476    Interpretive Statements  Sinus tachycardia  Borderline prolonged QT interval  Compared to ECG 2021 18:38:29  T-wave abnormality no longer present  Electronically Signed On 2023 19:00:38 PST by Kami Clarke       RADIOLOGY  I have independently interpreted the diagnostic imaging associated with this visit and am waiting the final reading from the radiologist.   My preliminary interpretation is as follows: Clear lung fields bilaterally.  Radiologist interpretation:   DX-CHEST-PORTABLE (1 VIEW)   Final Result      No acute cardiac or pulmonary abnormalities are identified.        COURSE & MEDICAL DECISION MAKING    ED Observation Status? Yes; I am placing the patient in to an observation status due to a diagnostic uncertainty as well as therapeutic intensity. Patient placed in observation status at 7:00 PM, 2023.     Observation plan is as follows: Labs, imaging, ophthalmology consultation and reassessment    Upon  Reevaluation, the patient's condition has: Improved; and will be discharged.    Patient discharged from ED Observation status at 8:30 PM (Time) 12/21/23 (Date).     INITIAL ASSESSMENT, COURSE AND PLAN  Care Narrative: This is a 63-year-old male presenting to the emergency department for evaluation of vision loss.  On initial evaluation, the patient was noted to be tachycardic and hypertensive.  Visual acuity was substantially diminished in the right eye and he was unable to visualize eye chart even.  His eye exam was otherwise reassuring although I was unable to visualize the fundus secondary to haziness.    8:21 PM - I discussed the case with deshawn Da Silva.  He is aware of this patient as he is his glaucoma physician.  He states that the patient is on appropriate eyedrops at this time and we will be happy to see the patient in the office first thing in the morning between 8 and 9 AM.  He did not recommend any further intervention in the emergency department at this time.    An IV was established and labs were sent given his persistent tachycardia.  His glucose was elevated at 220 and his BUN was slightly elevated at 28 but no other significant electrolyte derangements were noted.  H&H were reassuring with no evidence of anemia.  Lipase was minimally elevated at 147.  He had no tenderness palpation on my exam and I am less concerned for acute pancreatitis.    EKG did not show any evidence of acute ischemia and his troponin was negative.  His heart score is 3 and I am less concerned for ACS at this point.    The patient was observed in the ED and his tachycardia resolved.  I discussed the plan to have him follow-up with the ophthalmologist for Lala in the morning and he is agreeable with this plan.  He currently denies any symptoms.  He will return to the ED with any worsening signs or symptoms.    HYDRATION: Based on the patient's presentation of Tachycardia the patient was given IV fluids. IV Hydration was  used because oral hydration was not adequate alone. Upon recheck following hydration, the patient was improved.      ADDITIONAL PROBLEM LIST  Visual loss  DISPOSITION AND DISCUSSIONS  I have discussed management of the patient with the following physicians and MAMADOU's:  Dr Ceja ophtheunice    Discussion of management with other [facility] or appropriate source(s): None     Escalation of care considered, and ultimately not performed:acute inpatient care management, however at this time, the patient is most appropriate for outpatient management    Barriers to care at this time, including but not limited to:  None .     Decision tools and prescription drugs considered including, but not limited to:  None .    FINAL IMPRESSION  1. Vision loss of right eye        PRESCRIPTIONS  Discharge Medication List as of 12/21/2023  9:10 PM        FOLLOW UP  Ildefonso Ceja M.D.  [address] [place] [id number]  [phone]    Go to   Tomorrow morning between 8 and 9 AM    [facility], Emergency Dept  [address] [place] [id number]  [phone]  Go to   As needed      -DISCHARGE-    Electronically signed by: Kami Clarke D.O., 12/21/2023 6:40 PM

## 2024-01-22 ENCOUNTER — APPOINTMENT (OUTPATIENT)
Dept: BEHAVIORAL HEALTH | Facility: PSYCHIATRIC FACILITY | Age: 64
End: 2024-01-22
Payer: MEDICAID

## 2024-01-23 DIAGNOSIS — I1A.0 RESISTANT HYPERTENSION: ICD-10-CM

## 2024-01-24 ENCOUNTER — RESEARCH ENCOUNTER (OUTPATIENT)
Dept: SLEEP MEDICINE | Facility: MEDICAL CENTER | Age: 64
End: 2024-01-24
Attending: INTERNAL MEDICINE
Payer: MEDICAID

## 2024-01-24 DIAGNOSIS — I1A.0 RESISTANT HYPERTENSION: ICD-10-CM

## 2024-01-24 PROCEDURE — 99999 PR NO CHARGE: CPT | Performed by: INTERNAL MEDICINE

## 2024-01-24 NOTE — RESEARCH NOTE
Patient came in to discuss TN-202 Hypertension study.    Patient wishes to see Dr. Bloch on 2/8 about his new medications, and see a team at [facility] on 1/30 before signing consent.    Patient did not sign consent today, and will be in contact with us if he decides to participate in the future.    No study related procedures were performed

## 2024-01-24 NOTE — PROGRESS NOTES
[id number] Research Study  Study Visit 1 /  Week (-5)  /  Screening       Patient came inf or screening but would like to discuss with his cardiologist and other providers.  No consent signed signed at this time  Excluded

## 2024-02-02 ENCOUNTER — DOCUMENTATION (OUTPATIENT)
Dept: VASCULAR LAB | Facility: MEDICAL CENTER | Age: 64
End: 2024-02-02
Payer: MEDICAID

## 2024-02-02 ENCOUNTER — TELEPHONE (OUTPATIENT)
Dept: VASCULAR LAB | Facility: MEDICAL CENTER | Age: 64
End: 2024-02-02

## 2024-02-02 NOTE — PROGRESS NOTES
Established patient  Chart prep for upcoming appointment with Vascular APRN    Any pending/incomplete orders from last visit? Yes Labs, patient reminded to try to complete prior to appointment  Were any records requested?  No  Correct appointment type (New/Established/virtual)? Yes  Referral up to date? Yes  Referral attached to appointment (renewals and New patients only)? N/A (established with up-to-date referral)      Evette Montesinos, Medical Assistant   [facility]   Ph: [phone]  Fx: [phone]

## 2024-02-02 NOTE — TELEPHONE ENCOUNTER
Caller: Marcello Perez      Topic/issue: Patient was asking for his labs to be sent over to [facility] so that he could complete them before his appointment next week and he was asking for a call back      Callback Number: [phone]

## 2024-02-06 LAB — HBA1C MFR BLD: 10.2 % (ref ?–5.8)

## 2024-02-08 ENCOUNTER — NON-PROVIDER VISIT (OUTPATIENT)
Dept: VASCULAR LAB | Facility: MEDICAL CENTER | Age: 64
End: 2024-02-08
Attending: INTERNAL MEDICINE
Payer: MEDICAID

## 2024-02-08 ENCOUNTER — OFFICE VISIT (OUTPATIENT)
Dept: VASCULAR LAB | Facility: MEDICAL CENTER | Age: 64
End: 2024-02-08
Payer: MEDICAID

## 2024-02-08 VITALS
WEIGHT: 183 LBS | SYSTOLIC BLOOD PRESSURE: 136 MMHG | BODY MASS INDEX: 25.62 KG/M2 | HEIGHT: 71 IN | DIASTOLIC BLOOD PRESSURE: 85 MMHG | HEART RATE: 71 BPM

## 2024-02-08 DIAGNOSIS — I1A.0 RESISTANT HYPERTENSION: ICD-10-CM

## 2024-02-08 DIAGNOSIS — E78.5 DYSLIPIDEMIA: ICD-10-CM

## 2024-02-08 DIAGNOSIS — E11.9 TYPE 2 DIABETES MELLITUS WITHOUT COMPLICATION, UNSPECIFIED WHETHER LONG TERM INSULIN USE (HCC): Primary | ICD-10-CM

## 2024-02-08 DIAGNOSIS — E11.65 UNCONTROLLED TYPE 2 DIABETES MELLITUS WITH HYPERGLYCEMIA (HCC): ICD-10-CM

## 2024-02-08 PROCEDURE — 3079F DIAST BP 80-89 MM HG: CPT

## 2024-02-08 PROCEDURE — 99213 OFFICE O/P EST LOW 20 MIN: CPT

## 2024-02-08 PROCEDURE — 99212 OFFICE O/P EST SF 10 MIN: CPT

## 2024-02-08 PROCEDURE — 3075F SYST BP GE 130 - 139MM HG: CPT

## 2024-02-08 PROCEDURE — 99214 OFFICE O/P EST MOD 30 MIN: CPT

## 2024-02-08 RX ORDER — TELMISARTAN 40 MG/1
40 TABLET ORAL DAILY
Qty: 90 TABLET | Refills: 1 | Status: SHIPPED | OUTPATIENT
Start: 2024-02-08 | End: 2024-02-08

## 2024-02-08 RX ORDER — TELMISARTAN 40 MG/1
80 TABLET ORAL DAILY
COMMUNITY
End: 2024-02-08

## 2024-02-08 RX ORDER — TELMISARTAN 80 MG/1
80 TABLET ORAL DAILY
Qty: 90 TABLET | Refills: 3 | Status: SHIPPED | OUTPATIENT
Start: 2024-02-08 | End: 2024-02-08

## 2024-02-08 RX ORDER — PIOGLITAZONEHYDROCHLORIDE 45 MG/1
45 TABLET ORAL DAILY
Qty: 30 TABLET | Refills: 11 | Status: SHIPPED | OUTPATIENT
Start: 2024-02-08

## 2024-02-08 RX ORDER — TELMISARTAN 80 MG/1
80 TABLET ORAL DAILY
Qty: 90 TABLET | Refills: 3 | Status: SHIPPED | OUTPATIENT
Start: 2024-02-08

## 2024-02-08 ASSESSMENT — FIBROSIS 4 INDEX: FIB4 SCORE: 1.24

## 2024-02-08 NOTE — PROGRESS NOTES
RESISTANT HTN CLINIC- FOLLOW-UP   02/08/2024    Here for vascular med follow up    Assessment / Plan:       1. Blood Pressure Control:  Qualifies as resistant HTN:  No - good control <3 meds   Office BP Goal ACC/AHA (2017) goal <130/80  Home BP at goal:  no, unclear, reported >130/80  Office BP at goal:  no, elevated in office again today  24h ABPM (12/2019): Reasonable data acquisition. Mean daytime: 117/85 consistent with suboptimally controlled out of office diastolic blood pressure. Nocturnal dip: Appears somewhat blunted  Plan:    Monitoring:   - continue home BP monitoring, reviewed correct technique:  Yes, given log, pt to BRING IN LOG TO NEXT APPT   - monitor lytes/gfr routinely   - consider repeat abpm in future     2. Work up of Secondary Causes of Resistant Hypertension:   Renovascular HTN: Excluded  Primary Aldosteronism: Excluded  Thyroid Function: Excluded  Obstructive Sleep Apnea:  no longer on CPAP; states he sleeps well w/o snoring or daytime somnolence  Pheochromocytoma: Excluded   Cushing's:   Excluded   Other:   Recommendations At This Visit: none         3. Medication Use / Adherence:  Assessment: Complete  Recommendations: Instructed to Continue Taking All Medications As Prescribed         4. End Organ Damage:   Left Ventricular Hypertrophy: Absent on  Echocardiogram Date: 1/2020  Albuminuria: None   Renal Function: Chronic Kidney Disease   G2 at worst  but no recent labs  Established Cardiovascular Disease: None    5. Lifestyle Recommendations:    Smoking: pt continues vaping BCP products at the direction of Dr. Tirado for his glaucoma.  He understands that vaping is at his own risk.    Physical Activity: continue healthy activity to improve CV fitness including daily walking     Weight Management and Nutrition: Dietary plan was discussed with patient at this visit including DASH, low sodium    6. Standard HTN Pharmacotherapy:  ACEI/ARB: INCREASE telmisartan to 80mg daily  Thiazide Type  Diuretic: continue quinton 25mg daily - monitor for worsening breast tenderness- changed from chlorthalidone given possible gout  Calcium Channel Blocker (CCB): continue to hold at present. Has had leg swelling in past    7. Additional Agents:  Not currently indicated     8. Other CV Risk Factors:     LIPID MANAGEMENT:  Guidlelines recommend at least mod-intensity statin  Currently on statin: Yes  Lp(a) normal   ? H/o myalgias  Tolerating restart of atorvastatin   Not on ezetimibe  - focus on LDL-P reduction and apoB due to discordance to LDL-C   Tx goal: non-HDL-C <100,  LDL-C <70  (optional: apoB <80)   At goal: yes, 2/2024, LDL 48, non-HDL 75, elevated Trigs 205   Plan:  - reinforced ongoing TLC measures, reviewed triglyceride lowering handout   - continue atorva 40 mg daily  - continue vitamin D3 5,000 units   - recheck fasting lipid panel  - intensify therapy if above goal    GLYCEMIA MANAGEMENT:  Diabetic, T2, ophthalmic complications non-insulin   Lab Results   Component Value Date    HBA1C 10.2 (A) 02/06/2024    Goal A1c < 7.5, optimal <7.0  ACR: A1  Continued poor control based on a1c  No hypoglycemia on current lantus  Poor tolerance of victoza in past  Has had some difficulty with access to medications  Is following with DM clinic for pharmacotherapy  Plan:  - continue medications per DM clinic  - repeat a1c per DM clinic  - f/u in dm pharmacotherapy clinic    ANTIPLATELET/ANTICOAGULANT THERAPY: yes   - cont asa 81mg daily    9. Other Issues:    1) DM retinopathy, proliferative and glaucoma that may require surgery    - continue care with ophthalmo for injections routinely as well as homeopath f/u- defer to other specialties    2) BARBARA- off cpap.  Pt states he was getting constant headaches when he used CPAP; that has resolved and his sleep improved once he stopped.  - defer mgmt to sleep MD      3) vit D deficiency- stable 7/2021 (vit D 52)  - continue Vit D3 5,000 units daily     4) Lt eye palsy  -  "continue f/u with Dr. Pineda and retinal specialists      Studies Ordered At This Visit:  None  Blood Work to Be Obtained Prior to Next Visit:  Per below  Disposition: 4-6 weeks to review BP log, and as scheduled with dm pharmacoatherapy    Diagnosis:  1. Resistant hypertension  telmisartan (MICARDIS) 80 MG Tab    DISCONTINUED: telmisartan (MICARDIS) 40 MG Tab    DISCONTINUED: telmisartan (MICARDIS) 80 MG Tab      2. Uncontrolled type 2 diabetes mellitus with hyperglycemia (HCC)        3. Dyslipidemia            Subjective     History of Present Illness:     Here for follow-up of dyslipidemia, hypertension, diabetes    Last seen 12/21/2023    Lipids  Tolerating atorvastatin  No myalgias    Blood pressure  Not checking bps at home regularly, and not keeping log   Reports some readings >150/80  Is having increased HA and reports having a \"burst blood vessel\" in eye last week  He does describe good adherence with telmisartan and spironolactone  Has noted some left breast tenderness  Continues to have workup for gout, no recent episodes he remembers  Has longstanding ED      Diabetes  Describes good med adherence  Home FSBS 130s fasting  Is following up with diabetes clinic pharmacotherapy, has appt today    Recent increase in stress at home with family  Father recently had stroke, and is now in SNF  Having some increased falls recently, being worked up for possible \"equilibrium\" issue   Ongoing workup with ortho for muscle mass loss          Social History:     Social History     Tobacco Use    Smoking status: Never    Smokeless tobacco: Never   Substance Use Topics    Alcohol use: Yes     Comment: occ    Drug use: No   Exercise - walks four miles 2x per week  Diet - seeing nutritionist - portion control and low carb  Weight - stable     Objective:       Vitals:    02/08/24 0844 02/08/24 0849   BP: (!) 148/79 136/85   BP Location: Left arm Left arm   Patient Position: Sitting Sitting   BP Cuff Size: Adult Adult " "  Pulse: 75 71   Weight: 83 kg (183 lb)    Height: 1.791 m (5' 10.5\")        Body mass index is 25.89 kg/m².    BP:   BP Readings from Last 5 Encounters:   02/08/24 136/85   12/21/23 130/87   12/21/23 123/85   08/23/23 (!) 142/91   07/25/23 123/85      Physical Exam  Vitals reviewed.   Constitutional:       General: He is not in acute distress.     Appearance: He is not diaphoretic.   HENT:      Head: Normocephalic and atraumatic.   Eyes:      General: No scleral icterus.     Conjunctiva/sclera: Conjunctivae normal.   Cardiovascular:      Rate and Rhythm: Normal rate and regular rhythm.      Heart sounds: Normal heart sounds. No murmur heard.  Pulmonary:      Effort: Pulmonary effort is normal. No respiratory distress.      Breath sounds: Normal breath sounds. No wheezing or rales.   Musculoskeletal:      Right lower leg: No edema.      Left lower leg: No edema.   Skin:     Coloration: Skin is not pale.   Neurological:      General: No focal deficit present.      Mental Status: He is alert and oriented to person, place, and time.      Cranial Nerves: No cranial nerve deficit.      Coordination: Coordination normal.      Gait: Gait is intact. Gait normal.   Psychiatric:         Mood and Affect: Mood and affect normal.         Behavior: Behavior normal.          Accessory Clinical Findings:     Lab Results   Component Value Date/Time    HBA1C 10.2 (A) 02/06/2024 12:00 AM       Lab Results   Component Value Date    SODIUM 141 12/21/2023    POTASSIUM 3.6 12/21/2023    CHLORIDE 99 12/21/2023    CO2 26 12/21/2023    GLUCOSE 220 (H) 12/21/2023    BUN 28 (H) 12/21/2023    CREATININE 0.96 12/21/2023      j  [facility] (11/27/19):  Lp(a) normal, acr normal, gluc 171, remainder of CMP wnl, tsh wnl, konstantin/renin wnl, metaneph wnl  Wbc normal, hgb 17.6, normal plats, free cortisol wnl , vit D 12,   LDL-P 1282, small LDL-P956, high risk LDL-P size         [facility] labs 12/28/21  nonHDL-119, LDL-86, , gluc 149, Hgb/Hct " 18/53.4      MPI 2011  1. No evidence of infarct or ischemia.  2. Left ventricular ejection fraction calculated at 60%.    Echo stress 2012   Normal resting echocardiogram.   Normal treadmill stress echocardiogram.   Hypertension at rest and with exercise    Echo 1/2020  Normal right and left ventricular size and function.   Left ventricular ejection fraction is visually estimated to be 60%.  Mildly increased septal wall thickness.  No significant valve disease or flow abnormalities.   Normal estimated right atrial pressure.   Unable to estimate pulmonary artery pressure due to an inadequate   tricuspid regurgitant jet.   No prior study is available for comparison.     CRISSY duplex 1/2020  No sonographic evidence of renal artery stenosis.    Echocardiogram March 2021  Compared to the images of the study done 01- there has been no changes.  Left ventricular ejection fraction is visually estimated to be 65%.  Normal regional wall motion.  No significant valvular disease noted  No evidence of right to left shunt by agitated saline challenge.    Labs from [facility] 2/2024  Tot cholesterol 111, , HDL 36, LDL 48, [id number]  Na 140, creat 0.97, GFR 88, A1c 10.2      MARYJANE OliviaPJenRJenN.   [facility]   [facility]   [phone]     CC:  Dr.Raymond More

## 2024-02-08 NOTE — PROGRESS NOTES
Patient Consult Note    Primary care physician: Stephen More M.D.    Reason for consult: Management of Uncontrolled Type 2 Diabetes    HPI:  Marcello Perez is a 63 y.o. old patient who comes in today for evaluation of above stated problem.    Most Recent HbA1c and POCT glucose:   Lab Results   Component Value Date/Time    HBA1C 10.2 (A) 2024 12:00 AM             Most Recent Scr:  Lab Results   Component Value Date/Time    CREATININE 0.96 2023 07:10 PM    CREATININE 0.9 2008 09:15 AM        Current Diabetes Medication Regimen  SGLT-2 Inhibitor + DPP-4 Inhibitor: Glyxambi 25-5mg daily  TZD: Pioglitazone 30 mg daily    Previous Diabetes Medications and Reason for Discontinuation  Lantus - d/c by pt as he was only using it PRN  R insulin -- changed to pen   N insulin -- changed to pen  Trulicity - unknown dose, caused severe GI issues, significant bloating, ankle swelling  Metformin XR 500mg, significant GI issues   Glyburide -- GI upset     SMBG  Pt has home glucometer and proper testing technique - yes  Discussed BG Goals: FBG 80 - 130, 2hPP < 180, A1c < 7%    Pt reports blood sugars:    331 195 255 211  2h  178 276 247    Hypoglycemia  Hypoglycemia awareness - yes   Nocturnal hypoglycemia - none  Hypoglycemia: None    Pt's treatment of Hypoglycemia - educated on 15:15  - 15:15 Rule    Lifestyle  Current Exercise - Walks 60 min thrice weekly  Exercise Goal - 150 min/week, continue current regimen     Dietary - Works to avoid carbs. Following up w/ nutritionist.  Breakfast - vegetables  Lunch - ground beef or chicken with vegetables  Dinner - ground beef or chicken with vegetables, pork ribs    Snacks - pumpkin seeds, almonds, walnuts   Drinks - green teas, black teas, rare sodas     Foot Exam:  Monofilament exam - sees podiatrist regularly     Preventative Management  BP regimen (ACE/ARB) - telmisartan 40mg once daily  Statin - not currently on statin, rosuvastatin  caused GI upset   Last Retinal Scan - due for screening, possibly gotten 2 months ago   Last Microalbumin/Cr - Due. Orders in place.  Last A1c -   Lab Results   Component Value Date/Time    HBA1C 10.2 (A) 02/06/2024 12:00 AM     Past Medical History:  Patient Active Problem List    Diagnosis Date Noted    Dyslipidemia 04/14/2021    Ptosis, left eyelid 03/29/2021    BARBARA (obstructive sleep apnea) 04/22/2015    Uncontrolled type 2 diabetes mellitus with hyperglycemia ([facility]) 04/22/2015    Resistant hypertension 04/06/2015    DM2 (diabetes mellitus, type 2) ([facility]) 04/06/2015     Past Surgical History:  Past Surgical History:   Procedure Laterality Date    NASAL SEPTAL RECONST       Allergies:  Hctz [hydrochlorothiazide]    Social History:  Social History     Socioeconomic History    Marital status: Single     Spouse name: Not on file    Number of children: Not on file    Years of education: Not on file    Highest education level: Not on file   Occupational History    Not on file   Tobacco Use    Smoking status: Never    Smokeless tobacco: Never   Substance and Sexual Activity    Alcohol use: Yes     Comment: occ    Drug use: No    Sexual activity: Not on file   Other Topics Concern    Not on file   Social History Narrative    Not on file     Social Determinants of Health     Financial Resource Strain: Not on file   Food Insecurity: Not on file   Transportation Needs: Not on file   Physical Activity: Not on file   Stress: Not on file   Social Connections: Not on file   Intimate Partner Violence: Not on file   Housing Stability: Not on file       Family History:  Family History   Problem Relation Age of Onset    Clotting Disorder Neg Hx     Stroke Neg Hx     Heart Disease Neg Hx        Medications:    Current Outpatient Medications:     pioglitazone (ACTOS) 45 MG Tab, Take 1 Tablet by mouth every day., Disp: 30 Tablet, Rfl: 11    Empagliflozin-linaGLIPtin (GLYXAMBI) 25-5 MG Tab, Take 1 Tablet by mouth every day., Disp: 30  Tablet, Rfl: 11    atorvastatin (LIPITOR) 40 MG Tab, Take 1 Tablet by mouth every evening., Disp: 30 Tablet, Rfl: 11    spironolactone (ALDACTONE) 25 MG Tab, Take 1 Tablet by mouth every day., Disp: 30 Tablet, Rfl: 3    dorzolamide-timolol (COSOPT) 22.3-6.8 MG/ML Solution, , Disp: , Rfl:     Cholecalciferol 1.25 MG (72128 UT) Tab, Take 1 Tablet by mouth 2 times a day., Disp: , Rfl:     meloxicam (MOBIC) 7.5 MG Tab, Take 7.5 mg by mouth 1 time a day as needed for Moderate Pain., Disp: , Rfl:     NON SPECIFIED, Indications: Rejuvapor BCP & CBD 15mL total content, Disp: , Rfl:     aspirin (ASA) 81 MG Chew Tab chewable tablet, Chew and swallow 1 tablet by mouth every day., Disp: 100 tablet, Rfl: 1    lactobacillus rhamnosus (CULTURELLE) Cap capsule, Take 1 capsule by mouth every day., Disp: , Rfl:     telmisartan (MICARDIS) 80 MG Tab, Take 1 Tablet by mouth every day., Disp: 90 Tablet, Rfl: 3    Labs: Reviewed    Physical Examination:  Vital signs: There were no vitals taken for this visit. There is no height or weight on file to calculate BMI.    Assessment and Plan:    1. DM2   Discussed Goals: FBG 80 - 130, 2hPP < 180, a1c < 7.0%  A1c remains above goal at 10.2%  SMBG also elevated  Patient would benefit from optimizing pharmacotherapy further  He was on Lantus briefly but was only using this PRN  At previous visit, discussed trying Rybelsus but this was denied by insurance as pt would need to fail Trulicity and Victoza first. Pt has tried and failed Trulicity but he does not recall being on Victoza in the past.  Given previous intolerances to medications, patient would prefer to maximize the doses of his current medications at this time. Will increase pioglitazone dose.  He is amenable to initiating Victoza + Jardiance (d/c Glyxambi) in the future if needed, however patient notes that historically he also had intolerances with Jardiance monotherapy.    - Medication changes:  Increase pioglitazone to 45 mg daily  -  Continue:  Glyxambi 25-5 mg daily    - Lifestyle changes:  Continue with diet changes, low carbohydrates, continue exercise regimen and increase as tolerated.     Follow Up:  4 weeks    Khadar Prakash, PharmD, BCACP    CC:   Stephen More M.D.

## 2024-02-12 DIAGNOSIS — I1A.0 RESISTANT HYPERTENSION: ICD-10-CM

## 2024-02-12 DIAGNOSIS — E11.65 UNCONTROLLED TYPE 2 DIABETES MELLITUS WITH HYPERGLYCEMIA (HCC): ICD-10-CM

## 2024-02-12 DIAGNOSIS — E78.5 DYSLIPIDEMIA: ICD-10-CM

## 2024-03-07 ENCOUNTER — NON-PROVIDER VISIT (OUTPATIENT)
Dept: VASCULAR LAB | Facility: MEDICAL CENTER | Age: 64
End: 2024-03-07
Attending: INTERNAL MEDICINE
Payer: MEDICAID

## 2024-03-07 DIAGNOSIS — E11.65 UNCONTROLLED TYPE 2 DIABETES MELLITUS WITH HYPERGLYCEMIA (HCC): Primary | ICD-10-CM

## 2024-03-07 PROCEDURE — 99212 OFFICE O/P EST SF 10 MIN: CPT

## 2024-03-07 RX ORDER — INSULIN GLARGINE 100 [IU]/ML
INJECTION, SOLUTION SUBCUTANEOUS
Qty: 15 ML | Refills: 2 | Status: SHIPPED | OUTPATIENT
Start: 2024-03-07

## 2024-03-07 NOTE — PROGRESS NOTES
Patient Consult Note    Primary care physician: Stephen More M.D.    Reason for consult: Management of Uncontrolled Type 2 Diabetes    HPI:  Marcello Perez is a 63 y.o. old patient who comes in today for evaluation of above stated problem.    Most Recent HbA1c and POCT glucose:   Lab Results   Component Value Date/Time    HBA1C 10.2 (A) 2024 12:00 AM             Most Recent Scr:  Lab Results   Component Value Date/Time    CREATININE 0.96 2023 07:10 PM    CREATININE 0.9 2008 09:15 AM        Current Diabetes Medication Regimen  SGLT-2 Inhibitor + DPP-4 Inhibitor: Glyxambi 25-5mg daily  TZD: Pioglitazone 45 mg daily    Previous Diabetes Medications and Reason for Discontinuation  Lantus -- d/c by pt as he was only using it PRN  R insulin -- changed to pen   N insulin -- changed to pen  Trulicity - unknown dose, caused severe GI issues, significant bloating, ankle swelling  Metformin XR 500mg, significant GI issues   Glyburide -- GI upset     SMBG  Pt has home glucometer and proper testing technique - yes  Discussed BG Goals: FBG 80 - 130, 2hPP < 180, A1c < 7%    Pt reports blood sugars:    138 167 244 168 206  2h  279 224 219 293 298    Hypoglycemia  Hypoglycemia awareness - yes   Nocturnal hypoglycemia - none  Hypoglycemia: None    Pt's treatment of Hypoglycemia - educated on 15:15  - 15:15 Rule    Lifestyle  Current Exercise - typically walks 60 min thrice weekly but less at this time due to recent falls (does not like to use the cane)  Exercise Goal - 150 min/week, increase as tolerated    Dietary - Works to avoid carbs. Following up w/ nutritionist.  Breakfast - vegetables  Lunch - ground beef or chicken with vegetables  Dinner - ground beef or chicken with vegetables, pork ribs    Snacks - pumpkin seeds, almonds, walnuts   Drinks - green teas, black teas, rare sodas     Foot Exam:  Monofilament exam - sees podiatrist regularly     Preventative Management  BP  regimen (ACE/ARB) - telmisartan 40mg once daily  Statin - not currently on statin, rosuvastatin caused GI upset   Last Retinal Scan - due for screening, possibly gotten 2 months ago   Last Microalbumin/Cr - WNL, 02/2024  Last A1c -   Lab Results   Component Value Date/Time    HBA1C 10.2 (A) 02/06/2024 12:00 AM     Past Medical History:  Patient Active Problem List    Diagnosis Date Noted    Dyslipidemia 04/14/2021    Ptosis, left eyelid 03/29/2021    BARBARA (obstructive sleep apnea) 04/22/2015    Uncontrolled type 2 diabetes mellitus with hyperglycemia ([facility]) 04/22/2015    Resistant hypertension 04/06/2015    DM2 (diabetes mellitus, type 2) ([facility]) 04/06/2015     Past Surgical History:  Past Surgical History:   Procedure Laterality Date    NASAL SEPTAL RECONST       Allergies:  Hctz [hydrochlorothiazide]    Social History:  Social History     Socioeconomic History    Marital status: Single     Spouse name: Not on file    Number of children: Not on file    Years of education: Not on file    Highest education level: Not on file   Occupational History    Not on file   Tobacco Use    Smoking status: Never    Smokeless tobacco: Never   Substance and Sexual Activity    Alcohol use: Yes     Comment: occ    Drug use: No    Sexual activity: Not on file   Other Topics Concern    Not on file   Social History Narrative    Not on file     Social Determinants of Health     Financial Resource Strain: Not on file   Food Insecurity: Not on file   Transportation Needs: Not on file   Physical Activity: Not on file   Stress: Not on file   Social Connections: Not on file   Intimate Partner Violence: Not on file   Housing Stability: Not on file       Family History:  Family History   Problem Relation Age of Onset    Clotting Disorder Neg Hx     Stroke Neg Hx     Heart Disease Neg Hx        Medications:    Current Outpatient Medications:     insulin glargine (LANTUS SOLOSTAR) 100 UNIT/ML Solution Pen-injector injection, Inject 10 units subq  daily or as directed (max 50 units/day), Disp: 15 mL, Rfl: 2    Insulin Pen Needle 32 G x 4 mm, Use to inject insulin daily, Disp: 100 Each, Rfl: 3    pioglitazone (ACTOS) 45 MG Tab, Take 1 Tablet by mouth every day., Disp: 30 Tablet, Rfl: 11    telmisartan (MICARDIS) 80 MG Tab, Take 1 Tablet by mouth every day., Disp: 90 Tablet, Rfl: 3    Empagliflozin-linaGLIPtin (GLYXAMBI) 25-5 MG Tab, Take 1 Tablet by mouth every day., Disp: 30 Tablet, Rfl: 11    atorvastatin (LIPITOR) 40 MG Tab, Take 1 Tablet by mouth every evening., Disp: 30 Tablet, Rfl: 11    spironolactone (ALDACTONE) 25 MG Tab, Take 1 Tablet by mouth every day., Disp: 30 Tablet, Rfl: 3    dorzolamide-timolol (COSOPT) 22.3-6.8 MG/ML Solution, , Disp: , Rfl:     Cholecalciferol 1.25 MG (26806 UT) Tab, Take 1 Tablet by mouth 2 times a day., Disp: , Rfl:     meloxicam (MOBIC) 7.5 MG Tab, Take 7.5 mg by mouth 1 time a day as needed for Moderate Pain., Disp: , Rfl:     NON SPECIFIED, Indications: Rejuvapor BCP & CBD 15mL total content, Disp: , Rfl:     aspirin (ASA) 81 MG Chew Tab chewable tablet, Chew and swallow 1 tablet by mouth every day., Disp: 100 tablet, Rfl: 1    lactobacillus rhamnosus (CULTURELLE) Cap capsule, Take 1 capsule by mouth every day., Disp: , Rfl:     Labs: Reviewed    Physical Examination:  Vital signs: There were no vitals taken for this visit. There is no height or weight on file to calculate BMI.    Assessment and Plan:    1. DM2   Discussed Goals: FBG 80 - 130, 2hPP < 180, a1c < 7.0%  A1c above goal at 10.2%  SMBG remains elevated  Patient is tolerating pioglitazone dose increase but SMBG still above goal  Patient would benefit from optimizing pharmacotherapy further  At prior visits, discussed trying Rybelsus but this was denied by insurance as pt would need to fail Trulicity and Victoza first. Pt has tried and failed Trulicity but he does not recall being on Victoza in the past. Today, pt expresses concern about initiating Victoza due  to intolerance to Trulicity.  Given previous intolerances, will initiate basal insulin as this will likely be more tolerable compared to non-insulin pharmacotherapy    - Medication changes:  Start Lantus 10 units daily. Increase by 2 units every 3 days until FBG is < 130  - Continue:  Glyxambi 25-5 mg daily  Pioglitazone 45 mg daily    - Lifestyle changes:  Continue with diet changes, low carbohydrates  Increase exercise regimen tolerated.     Follow Up:  4 weeks    Khadar Prakash, PharmD, BCACP    CC:   Stephen More M.D.

## 2024-03-07 NOTE — PATIENT INSTRUCTIONS
Inject Lantus 10 units daily  Increase by 2 units every 3 days until fasting blood sugars are less than 130    Continue all other medications

## 2024-03-21 ENCOUNTER — TELEPHONE (OUTPATIENT)
Dept: VASCULAR LAB | Facility: MEDICAL CENTER | Age: 64
End: 2024-03-21
Payer: MEDICAID

## 2024-03-21 NOTE — TELEPHONE ENCOUNTER
Called patient to f/u on BG readings. Unable to reach. LVM with call back number.    Khadar Prakash, TigreD, BCACP

## 2024-03-29 NOTE — TELEPHONE ENCOUNTER
Called and spoke to patient. He reports FBGs ~101 and PPGs ~190. He is only on Lantus 10 units daily and has not needed to titrate his dose. Patient reports that he does seem to have more diarrhea lately and is also having occasional abdominal pain. He states that his neuro physician, Dr. Galindo Marquez, ordered several labs for him and is wondering if we could request for these results so that these can be reviewed at his upcoming DM f/u appt. Will attempt to coordinate.    Advised to monitor sx at this time. Unlikely d/t insulin therapy but given onset of sx, reasonable to hold Lantus x 2-3 days to see if sx will resolve, then resume Lantus to observe if sx will return.    Patient verbalized understanding of instructions.    Appt r/s to 04/11/24 as pt is in the process of moving.    Khadar Prakash, TigreD, BCACP

## 2024-04-01 NOTE — TELEPHONE ENCOUNTER
Called Dr. Blackburn's office to request for most recent labs. Left detailed VM for MA including clinic phone/fax number.    Khadar Prakash, TigreD, BCACP

## 2024-04-04 ENCOUNTER — APPOINTMENT (OUTPATIENT)
Dept: VASCULAR LAB | Facility: MEDICAL CENTER | Age: 64
End: 2024-04-04
Payer: MEDICAID

## 2024-04-11 ENCOUNTER — APPOINTMENT (OUTPATIENT)
Dept: VASCULAR LAB | Facility: MEDICAL CENTER | Age: 64
End: 2024-04-11
Attending: INTERNAL MEDICINE
Payer: MEDICAID

## 2024-04-18 ENCOUNTER — NON-PROVIDER VISIT (OUTPATIENT)
Dept: VASCULAR LAB | Facility: MEDICAL CENTER | Age: 64
End: 2024-04-18
Attending: INTERNAL MEDICINE
Payer: MEDICAID

## 2024-04-18 VITALS — DIASTOLIC BLOOD PRESSURE: 80 MMHG | HEART RATE: 73 BPM | SYSTOLIC BLOOD PRESSURE: 130 MMHG

## 2024-04-18 PROCEDURE — 99212 OFFICE O/P EST SF 10 MIN: CPT

## 2024-04-18 NOTE — PROGRESS NOTES
Patient Consult Note    Primary care physician: Stephen More M.D.    Reason for consult: Management of Uncontrolled Type 2 Diabetes    HPI:  Marcello Perez is a 63 y.o. old patient who comes in today for evaluation of above stated problem.    Most Recent HbA1c and POCT glucose:   Lab Results   Component Value Date/Time    HBA1C 10.2 (A) 2024 12:00 AM             Most Recent Scr:  Lab Results   Component Value Date/Time    CREATININE 0.96 2023 07:10 PM    CREATININE 0.9 2008 09:15 AM        Current Diabetes Medication Regimen  SGLT-2 Inhibitor + DPP-4 Inhibitor: Glyxambi 25-5mg daily  TZD: Pioglitazone 45 mg daily    Previous Diabetes Medications and Reason for Discontinuation  Lantus -- d/c by pt as he was only using it PRN  R insulin -- changed to pen   N insulin -- changed to pen  Trulicity - unknown dose, caused severe GI issues, significant bloating, ankle swelling  Metformin XR 500mg, significant GI issues   Glyburide -- GI upset     SMBG  Pt has home glucometer and proper testing technique - yes  Discussed BG Goals: FBG 80 - 130, 2hPP < 180, A1c < 7%    Pt reports blood sugars:   FB-120  2h PP-250    Hypoglycemia  Hypoglycemia awareness - yes   Nocturnal hypoglycemia - none  Hypoglycemia: None    Pt's treatment of Hypoglycemia - educated on 15:15  - 15:15 Rule    Lifestyle  Current Exercise - typically walks 60 min thrice weekly but less at this time due to recent falls (does not like to use the cane)  Exercise Goal - 150 min/week, increase as tolerated    Dietary - Works to avoid carbs. Following up w/ nutritionist.  Breakfast - vegetables  Lunch - ground beef or chicken with vegetables  Dinner - ground beef or chicken with vegetables, pork ribs    Snacks - pumpkin seeds, almonds, walnuts   Drinks - green teas, black teas, rare sodas     Foot Exam:  Monofilament exam - sees podiatrist regularly     Preventative Management  BP regimen (ACE/ARB) - telmisartan 40mg once  daily  Statin - not currently on statin, rosuvastatin caused GI upset   Last Retinal Scan - due for screening, possibly gotten 2 months ago   Last Microalbumin/Cr - WNL, 02/2024  Last A1c -   Lab Results   Component Value Date/Time    HBA1C 10.2 (A) 02/06/2024 12:00 AM     Past Medical History:  Patient Active Problem List    Diagnosis Date Noted    Dyslipidemia 04/14/2021    Ptosis, left eyelid 03/29/2021    BARBARA (obstructive sleep apnea) 04/22/2015    Uncontrolled type 2 diabetes mellitus with hyperglycemia ([facility]) 04/22/2015    Resistant hypertension 04/06/2015    DM2 (diabetes mellitus, type 2) ([facility]) 04/06/2015     Past Surgical History:  Past Surgical History:   Procedure Laterality Date    NASAL SEPTAL RECONST       Allergies:  Hctz [hydrochlorothiazide]    Social History:  Social History     Socioeconomic History    Marital status: Single     Spouse name: Not on file    Number of children: Not on file    Years of education: Not on file    Highest education level: Not on file   Occupational History    Not on file   Tobacco Use    Smoking status: Never    Smokeless tobacco: Never   Substance and Sexual Activity    Alcohol use: Yes     Comment: occ    Drug use: No    Sexual activity: Not on file   Other Topics Concern    Not on file   Social History Narrative    Not on file     Social Determinants of Health     Financial Resource Strain: Not on file   Food Insecurity: Not on file   Transportation Needs: Not on file   Physical Activity: Not on file   Stress: Not on file   Social Connections: Not on file   Intimate Partner Violence: Not on file   Housing Stability: Not on file       Family History:  Family History   Problem Relation Age of Onset    Clotting Disorder Neg Hx     Stroke Neg Hx     Heart Disease Neg Hx        Medications:    Current Outpatient Medications:     insulin glargine (LANTUS SOLOSTAR) 100 UNIT/ML Solution Pen-injector injection, Inject 10 units subq daily or as directed (max 50 units/day),  Disp: 15 mL, Rfl: 2    Insulin Pen Needle 32 G x 4 mm, Use to inject insulin daily, Disp: 100 Each, Rfl: 3    pioglitazone (ACTOS) 45 MG Tab, Take 1 Tablet by mouth every day., Disp: 30 Tablet, Rfl: 11    telmisartan (MICARDIS) 80 MG Tab, Take 1 Tablet by mouth every day., Disp: 90 Tablet, Rfl: 3    Empagliflozin-linaGLIPtin (GLYXAMBI) 25-5 MG Tab, Take 1 Tablet by mouth every day., Disp: 30 Tablet, Rfl: 11    atorvastatin (LIPITOR) 40 MG Tab, Take 1 Tablet by mouth every evening., Disp: 30 Tablet, Rfl: 11    spironolactone (ALDACTONE) 25 MG Tab, Take 1 Tablet by mouth every day., Disp: 30 Tablet, Rfl: 3    dorzolamide-timolol (COSOPT) 22.3-6.8 MG/ML Solution, , Disp: , Rfl:     Cholecalciferol 1.25 MG (60663 UT) Tab, Take 1 Tablet by mouth 2 times a day., Disp: , Rfl:     meloxicam (MOBIC) 7.5 MG Tab, Take 7.5 mg by mouth 1 time a day as needed for Moderate Pain., Disp: , Rfl:     NON SPECIFIED, Indications: Rejuvapor BCP & CBD 15mL total content, Disp: , Rfl:     aspirin (ASA) 81 MG Chew Tab chewable tablet, Chew and swallow 1 tablet by mouth every day., Disp: 100 tablet, Rfl: 1    lactobacillus rhamnosus (CULTURELLE) Cap capsule, Take 1 capsule by mouth every day., Disp: , Rfl:     Labs: Reviewed    Physical Examination:  Vital signs: /80   Pulse 73  There is no height or weight on file to calculate BMI.    Assessment and Plan:    1. DM2   Discussed Goals: FBG 80 - 130, 2hPP < 180, a1c < 7.0%  A1c above goal at 10.2%  SMBG greatly improved from previous appt with addition of lantus. No signs of hypoglycemia  At prior visits, discussed trying Rybelsus but this was denied by insurance as pt would need to fail Trulicity and Victoza first. Pt has tried and failed Trulicity but he does not recall being on Victoza in the past. Today, pt expresses concern about initiating Victoza due to intolerance to Trulicity.  Patient appears to be tolerating insulin without any issues, anticipate A1c to be lower at next  visit.     - Medication changes:  None  - Continue:  Lantus 10 units daily  Glyxambi 25-5 mg daily  Pioglitazone 45 mg daily    - Lifestyle changes:  Continue with diet changes, low carbohydrates  Increase exercise regimen tolerated.     Follow Up:  1 month for a1c    Tigre MckeonD    CC:   RUTHIE Sanchez ([phone])

## 2024-05-22 ENCOUNTER — APPOINTMENT (OUTPATIENT)
Dept: VASCULAR LAB | Facility: MEDICAL CENTER | Age: 64
End: 2024-05-22
Attending: INTERNAL MEDICINE
Payer: MEDICAID

## 2024-05-24 ENCOUNTER — NON-PROVIDER VISIT (OUTPATIENT)
Dept: VASCULAR LAB | Facility: MEDICAL CENTER | Age: 64
End: 2024-05-24
Attending: INTERNAL MEDICINE
Payer: MEDICAID

## 2024-05-24 DIAGNOSIS — E11.65 UNCONTROLLED TYPE 2 DIABETES MELLITUS WITH HYPERGLYCEMIA (HCC): ICD-10-CM

## 2024-05-24 NOTE — PROGRESS NOTES
Patient Consult Note    Primary care physician: Pcp Pt States None    Reason for consult: Management of Uncontrolled Type 2 Diabetes    HPI:  Marcello Perez is a 64 y.o. old patient who comes in today for evaluation of above stated problem.    Allergies  Glyburide, Hctz [hydrochlorothiazide], Lantus [insulin glargine], Metformin, and Trulicity [dulaglutide]    Current Diabetes Medication Regimen  SGLT-2 Inhibitor + DPP-4 Inhibitor: Glyxambi 25-5mg daily  TZD: Pioglitazone 45 mg daily    Basal Insulin: Lantus 10 units daily    Previous Diabetes Medications and Reason for Discontinuation  Lantus -- d/c by pt as he was only using it PRN  R insulin -- changed to pen   N insulin -- changed to pen  Trulicity/Ozempic - unknown dose, caused severe GI issues, significant bloating, ankle swelling  Metformin XR 500mg, significant GI issues   Glyburide -- GI upset     Potential Barriers to Care:  Adherence: denies missed doses  Side effects: diarrhea   Affordability: Medicaid     SMBG  Pt has home glucometer and proper testing technique   Discussed BG Goals: FBG 80 - 130, 2hPP < 180, A1c < 7.0%    Pt reports blood sugars:   Before Breakfast:   Evening B-250    Hypoglycemia  Hypoglycemia awareness: No   Nocturnal hypoglycemia: None  Hypoglycemia:  None  Pt's treatment of Hypoglycemia  Discussed 15:15 Rule    Lifestyle  Current Exercise - Walks 60 minutes twice a week and occasionally around the neighborhood.   Exercise Goal - 150 min/week, increase as tolerated       Dietary - Following nutritionist - has Meals on Wheels once a day - 7x a week  Breakfast - Meals on Wheels - various meals (veggies, pasta, protein)  Lunch - PBJ and milk, fast food, tortilla sandwiches   Dinner - Turkey sandwich, PBJ, corn + beef w/ teriyaki sauce w/ bread (wheat)  Snacks - Potato chips, walnuts, almonds   Drinks - Milk, water     Labs  Lab Results   Component Value Date/Time    HBA1C 10.2 (A) 2024 12:00 AM      Lab  Results   Component Value Date/Time    SODIUM 141 12/21/2023 07:10 PM    POTASSIUM 3.6 12/21/2023 07:10 PM    CHLORIDE 99 12/21/2023 07:10 PM    CO2 26 12/21/2023 07:10 PM    GLUCOSE 220 (H) 12/21/2023 07:10 PM    BUN 28 (H) 12/21/2023 07:10 PM    CREATININE 0.96 12/21/2023 07:10 PM    CREATININE 0.9 06/18/2008 09:15 AM    BUNCREATRAT 27 (H) 04/22/2015 10:28 AM     Lab Results   Component Value Date/Time    ALKPHOSPHAT 113 (H) 12/21/2023 07:10 PM    ASTSGOT 27 12/21/2023 07:10 PM    ALTSGPT 35 12/21/2023 07:10 PM    TBILIRUBIN 0.4 12/21/2023 07:10 PM    INR 1.04 12/21/2023 07:10 PM    ALBUMIN 4.4 12/21/2023 07:10 PM      Lab Results   Component Value Date/Time    CHOLSTRLTOT 180 03/30/2021 04:46 AM     (H) 03/30/2021 04:46 AM    HDL 32 (A) 03/30/2021 04:46 AM    TRIGLYCERIDE 210 (H) 03/30/2021 04:46 AM       Lab Results   Component Value Date/Time    MALBCRT 14 03/01/2010 10:51 AM    MICROALBUR 3.4 03/01/2010 10:51 AM       Physical Examination:  Vital signs: There were no vitals taken for this visit. There is no height or weight on file to calculate BMI.    Assessment and Plan:    1. DM2  Discussed Goals: FBG 80 - 130, 2hPP < 180, a1c < 7.0%  Last a1c drawn on 5/24/24 was 8.9%, which is not at goal  Patient presents to clinic for DM follow-up and A1c test today. Patient brought in home blood glucose test and reviewed BG ranges. Patient still has elevated BG with max reaching 250's, however, mostly in ranges noted above. Repeat A1c test this visit decreased from 10.2% to 8.9%. Patient compliant with current medications including lantus 10 units daily. Discussed transitioning over to Victoza in hopes to discontinue current insulin regimen. However, patient hesitant to start Victoza this visit and would like to readdress again next visit.   Patient states he is quite active in walking - 60 minutes x2 a week and occasionally throughout the week. Discussed increasing exercise regimen to 150 minutes/week with  increased intensity. Patient in agreement.  Patient has heavy CHO diet with sandwiches/tortillas daily for lunch and dinner. Discussed reducing CHO in current diet and increasing lean meats and vegetables similar to Meals on Wheels meals that are pre-portioned.    - Medication changes:  None   - Continue:  Lantus 10 units daily  Glyxambi 25-5 mg daily  Pioglitazone 45 mg daily     - Lifestyle changes:  Exercise Goal - Increase 150 minutes/week   Dietary Goal - Decrease CHO intake, increase lean meats and vegetables.    Follow Up:  1 months    Alvina Hyatt, Pharmacy Intern   Thuy Campbell, PharmD    CC:   Pcp Pt States None

## 2024-06-05 ENCOUNTER — TELEPHONE (OUTPATIENT)
Dept: MEDICAL GROUP | Facility: PHYSICIAN GROUP | Age: 64
End: 2024-06-05
Payer: MEDICAID

## 2024-06-05 NOTE — TELEPHONE ENCOUNTER
Patient interested in beginning GLP1a therapy.  Suggested this be discussed at upcoming appointment as he will need to d/c DPP4i therapy at time of GLP1a initiation and likely need insulin adjustment as well.  He is agreeable to this plan.  Lexx Rosas, PharmD, BCACP

## 2024-06-28 ENCOUNTER — NON-PROVIDER VISIT (OUTPATIENT)
Dept: VASCULAR LAB | Facility: MEDICAL CENTER | Age: 64
End: 2024-06-28
Attending: INTERNAL MEDICINE
Payer: MEDICAID

## 2024-06-28 DIAGNOSIS — E11.65 UNCONTROLLED TYPE 2 DIABETES MELLITUS WITH HYPERGLYCEMIA (HCC): ICD-10-CM

## 2024-06-28 PROCEDURE — 99212 OFFICE O/P EST SF 10 MIN: CPT

## 2024-06-28 RX ORDER — SEMAGLUTIDE 1.34 MG/ML
.25-.5 INJECTION, SOLUTION SUBCUTANEOUS
Qty: 1.5 ML | Refills: 1 | Status: SHIPPED | OUTPATIENT
Start: 2024-06-28

## 2024-06-28 RX ORDER — EMPAGLIFLOZIN 25 MG/1
1 TABLET, FILM COATED ORAL DAILY
Qty: 90 TABLET | Refills: 3 | Status: SHIPPED | OUTPATIENT
Start: 2024-06-28

## 2024-06-28 NOTE — PROGRESS NOTES
Patient Consult Note    Primary care physician: Pcp Pt States None    Reason for consult: Management of Uncontrolled Type 2 Diabetes    HPI:  Marcello Perez is a 64 y.o. old patient who comes in today for evaluation of above stated problem.    Allergies  Glyburide, Hctz [hydrochlorothiazide], Lantus [insulin glargine], Metformin, and Trulicity [dulaglutide]    Current Diabetes Medication Regimen  SGLT-2 Inhibitor + DPP-4 Inhibitor: Glyxambi 25-5mg daily  TZD: Pioglitazone 45 mg daily    Basal Insulin: Lantus 10 units daily    Previous Diabetes Medications and Reason for Discontinuation  Lantus -- d/c by pt as he was only using it PRN  R insulin -- changed to pen   N insulin -- changed to pen  Trulicity/Ozempic - unknown dose, caused severe GI issues, significant bloating, ankle swelling  Metformin XR 500mg, significant GI issues   Glyburide -- GI upset     Potential Barriers to Care:  Adherence: reports missed doses of glyxambi for 1 week  Side effects: diarrhea (he attributes to lantus)  Affordability: Medicaid     SMBG  Pt has home glucometer and proper testing technique   Discussed BG Goals: FBG 80 - 130, 2hPP < 180, A1c < 7.0%    Pt reports blood sugars:   Before Breakfast: 200's this AM   Evening B, doesn't remember but states high     Hypoglycemia  Hypoglycemia awareness: No   Nocturnal hypoglycemia: None  Hypoglycemia:  None  Pt's treatment of Hypoglycemia  Discussed 15:15 Rule    Lifestyle  Current Exercise - Walks 60 minutes twice a week and occasionally around the neighborhood.   Exercise Goal - 150 min/week, increase as tolerated       Dietary - Eating more seafood, cutting back on fast food  Following nutritionist - has Meals on Wheels once a day - 7x a week  Breakfast - Meals on Wheels - various meals (veggies, pasta, protein)  Lunch - PBJ and milk, fast food, tortilla sandwiches   Dinner - Turkey sandwich, PBJ, corn + beef w/ teriyaki sauce w/ bread (wheat)  Snacks - Potato chips,  walnuts, almonds   Drinks - Milk, water     Labs  Lab Results   Component Value Date/Time    HBA1C 8.9 (A) 05/24/2024 03:00 PM      Lab Results   Component Value Date/Time    SODIUM 141 12/21/2023 07:10 PM    POTASSIUM 3.6 12/21/2023 07:10 PM    CHLORIDE 99 12/21/2023 07:10 PM    CO2 26 12/21/2023 07:10 PM    GLUCOSE 220 (H) 12/21/2023 07:10 PM    BUN 28 (H) 12/21/2023 07:10 PM    CREATININE 0.96 12/21/2023 07:10 PM    CREATININE 0.9 06/18/2008 09:15 AM    BUNCREATRAT 27 (H) 04/22/2015 10:28 AM     Lab Results   Component Value Date/Time    ALKPHOSPHAT 113 (H) 12/21/2023 07:10 PM    ASTSGOT 27 12/21/2023 07:10 PM    ALTSGPT 35 12/21/2023 07:10 PM    TBILIRUBIN 0.4 12/21/2023 07:10 PM    INR 1.04 12/21/2023 07:10 PM    ALBUMIN 4.4 12/21/2023 07:10 PM      Lab Results   Component Value Date/Time    CHOLSTRLTOT 180 03/30/2021 04:46 AM     (H) 03/30/2021 04:46 AM    HDL 32 (A) 03/30/2021 04:46 AM    TRIGLYCERIDE 210 (H) 03/30/2021 04:46 AM       Lab Results   Component Value Date/Time    MALBCRT 14 03/01/2010 10:51 AM    MICROALBUR 3.4 03/01/2010 10:51 AM       Physical Examination:  Vital signs: There were no vitals taken for this visit. There is no height or weight on file to calculate BMI.    Assessment and Plan:    1. DM2  Discussed Goals: FBG 80 - 130, 2hPP < 180, a1c < 7.0%  Last a1c drawn on 5/24/24 was 8.9%, which is not at goal  Patient did not bring BG log today but reported BG are very high (especially when off of glyxambi)   He is interested in trying Ozempic to help reduce his appetite and control BG  Will change glyxambi to Jardiance given DPP4  States he is having diarrhea he attributes to the Lantus when given over 8 units. Goal to decrease Lantus with initiation of Ozempic with potential to d/c insulin.     - Medication changes:  Start Ozempic 0.25 mg weekly   Start Jardiance 25 mg  Stop Glyxambi 25/5 mg daily  - Continue:  Lantus 8 units daily  Pioglitazone 45 mg daily     - Lifestyle  changes:  Exercise Goal - Increase 150 minutes/week   Dietary Goal - Decrease CHO intake, increase lean meats and vegetables.    Follow Up:  1 months    Tigre MckeonD    CC:   Pcp Pt States None

## 2024-07-19 ENCOUNTER — NON-PROVIDER VISIT (OUTPATIENT)
Dept: VASCULAR LAB | Facility: MEDICAL CENTER | Age: 64
End: 2024-07-19
Attending: INTERNAL MEDICINE
Payer: MEDICAID

## 2024-07-19 VITALS — DIASTOLIC BLOOD PRESSURE: 79 MMHG | HEART RATE: 72 BPM | SYSTOLIC BLOOD PRESSURE: 126 MMHG

## 2024-07-19 DIAGNOSIS — E78.5 DYSLIPIDEMIA: ICD-10-CM

## 2024-07-19 DIAGNOSIS — E11.65 UNCONTROLLED TYPE 2 DIABETES MELLITUS WITH HYPERGLYCEMIA (HCC): ICD-10-CM

## 2024-07-19 PROCEDURE — 99212 OFFICE O/P EST SF 10 MIN: CPT

## 2024-07-25 ENCOUNTER — TELEPHONE (OUTPATIENT)
Dept: CARDIOLOGY | Facility: MEDICAL CENTER | Age: 64
End: 2024-07-25
Payer: MEDICAID

## 2024-07-26 ENCOUNTER — DOCUMENTATION (OUTPATIENT)
Dept: MEDICAL GROUP | Facility: PHYSICIAN GROUP | Age: 64
End: 2024-07-26
Payer: MEDICAID

## 2024-07-26 NOTE — TELEPHONE ENCOUNTER
"[facility] MED    Caller: Marcello Huang Ana    Topic/issue: MEDICATION MANAGEMENT     Marcello states that he has taken the OZEMPIC for the first time today and he is experiencing some side effects of the medication. He is experiencing swollen lower extremities, he states that from his groin down he can \"feel\" his veins, he has a headache and has been dealing with these symptoms for a couple of days now with no relief. He would appreciate a call back to discuss as soon as possible. Please advise.    Thank you,  Micah BANDA    Callback Number: [phone]    "

## 2024-07-30 ENCOUNTER — OFFICE VISIT (OUTPATIENT)
Dept: VASCULAR LAB | Facility: MEDICAL CENTER | Age: 64
End: 2024-07-30
Attending: NURSE PRACTITIONER
Payer: MEDICAID

## 2024-07-30 VITALS
DIASTOLIC BLOOD PRESSURE: 94 MMHG | HEIGHT: 70 IN | WEIGHT: 200 LBS | BODY MASS INDEX: 28.63 KG/M2 | SYSTOLIC BLOOD PRESSURE: 152 MMHG | HEART RATE: 70 BPM

## 2024-07-30 DIAGNOSIS — G47.33 OSA (OBSTRUCTIVE SLEEP APNEA): ICD-10-CM

## 2024-07-30 DIAGNOSIS — I1A.0 RESISTANT HYPERTENSION: ICD-10-CM

## 2024-07-30 DIAGNOSIS — M79.89 LEG SWELLING: ICD-10-CM

## 2024-07-30 DIAGNOSIS — E11.9 TYPE 2 DIABETES MELLITUS WITHOUT COMPLICATION, UNSPECIFIED WHETHER LONG TERM INSULIN USE (HCC): ICD-10-CM

## 2024-07-30 DIAGNOSIS — E78.5 DYSLIPIDEMIA: ICD-10-CM

## 2024-07-30 PROCEDURE — 99212 OFFICE O/P EST SF 10 MIN: CPT

## 2024-07-30 RX ORDER — PIOGLITAZONEHYDROCHLORIDE 30 MG/1
30 TABLET ORAL DAILY
Qty: 100 TABLET | Refills: 3 | Status: SHIPPED | OUTPATIENT
Start: 2024-07-30

## 2024-07-30 ASSESSMENT — FIBROSIS 4 INDEX: FIB4 SCORE: 1.26

## 2024-08-09 ENCOUNTER — TELEPHONE (OUTPATIENT)
Dept: VASCULAR LAB | Facility: MEDICAL CENTER | Age: 64
End: 2024-08-09
Payer: MEDICAID

## 2024-08-09 DIAGNOSIS — I1A.0 RESISTANT HYPERTENSION: ICD-10-CM

## 2024-08-09 NOTE — TELEPHONE ENCOUNTER
Established patient  Chart prep for upcoming appointment.    Any pending/incomplete orders from last visit? Yes Labs  Was patient called and reminded to complete pending orders? Yes  Were any records requested?  No    Referral up to date? Yes renewal ordered, sent to provider to co-sign, AND new referral attached to upcoming appointment.    Referral attached to appointment (renewals and New patients only)? Yes renewal ordered and sent to provider to co-sign   Virtual appointment? No

## 2024-08-16 ENCOUNTER — NON-PROVIDER VISIT (OUTPATIENT)
Dept: VASCULAR LAB | Facility: MEDICAL CENTER | Age: 64
End: 2024-08-16
Attending: INTERNAL MEDICINE
Payer: MEDICAID

## 2024-08-16 VITALS — SYSTOLIC BLOOD PRESSURE: 131 MMHG | DIASTOLIC BLOOD PRESSURE: 88 MMHG | HEART RATE: 79 BPM

## 2024-08-16 DIAGNOSIS — I1A.0 RESISTANT HYPERTENSION: ICD-10-CM

## 2024-08-16 DIAGNOSIS — M79.89 LEG SWELLING: ICD-10-CM

## 2024-08-16 DIAGNOSIS — E11.65 UNCONTROLLED TYPE 2 DIABETES MELLITUS WITH HYPERGLYCEMIA (HCC): ICD-10-CM

## 2024-08-16 PROCEDURE — 99212 OFFICE O/P EST SF 10 MIN: CPT

## 2024-08-16 RX ORDER — SEMAGLUTIDE 0.68 MG/ML
0.5 INJECTION, SOLUTION SUBCUTANEOUS
Qty: 3 ML | Refills: 5 | Status: SHIPPED | OUTPATIENT
Start: 2024-08-16

## 2024-08-16 NOTE — PROGRESS NOTES
Patient Consult Note    Primary care physician: JHONATHAN DICKINSON A.P.R.N.    Reason for consult: Management of Uncontrolled Type 2 Diabetes    HPI:  Marcello Perez is a 64 y.o. old patient who comes in today for evaluation of above stated problem.    Allergies  Glyburide, Hctz [hydrochlorothiazide], Lantus [insulin glargine], Metformin, and Trulicity [dulaglutide]    Current Diabetes Medication Regimen  GLP-1 or GLP-1/GIP Agent: semaglutide (Ozempic) 0.25 mg weekly  Thiazolidinedione: pioglitazone (Actos) 30 mg daily   Basal Insulin: Lantus 8 units QHS  SGLT2: Jardiance 25 mg daily           Previous Diabetes Medications and Reason for Discontinuation  Glyxambi     Potential Barriers to Care:  Adherence: reports rare missed doses.   Reported taking half tablet of 45 mg pioglitazone, will transition to 30 mg at next refill.   Missed one week of ozempic >1 month ago  Side effects: Patient reports pain, but doesn't attribute it to his meds.   Affordability: none    SMBG  Pt has home glucometer and proper testing technique - Yes  Discussed BG Goals: FBG 80 - 130, 2hPP < 180, A1c < 7.0%    Pt reports blood sugars:   Before Breakfast: ranges from 128-200   2 hours After Lunch: around 200  2 hours after Dinner: around 200      Hypoglycemia  Hypoglycemia awareness: Yes  Nocturnal hypoglycemia: None  Hypoglycemia:  None  Pt's treatment of Hypoglycemia  Discussed 15:15 Rule    Lifestyle  Current Exercise - walking every other day    Dietary - Common adult- sees nutritionist. Pt reports reducing carbs  Breakfast - once every 3 days, will make breakfast. Eggs, vegetables, beef, rice.   Meals:  - meals on wheels, will often skip meals here and there  Snacks - tuna fish salad, fish, leftovers, tiny portions, cereal  Drinks - water, tea, coffee, milk (occasionally)    Labs  Lab Results   Component Value Date/Time    HBA1C 8.9 (A) 05/24/2024 03:00 PM      Lab Results   Component Value Date/Time    SODIUM 141 12/21/2023  07:10 PM    POTASSIUM 3.6 12/21/2023 07:10 PM    CHLORIDE 99 12/21/2023 07:10 PM    CO2 26 12/21/2023 07:10 PM    GLUCOSE 220 (H) 12/21/2023 07:10 PM    BUN 28 (H) 12/21/2023 07:10 PM    CREATININE 0.96 12/21/2023 07:10 PM    CREATININE 0.9 06/18/2008 09:15 AM    BUNCREATRAT 27 (H) 04/22/2015 10:28 AM     Lab Results   Component Value Date/Time    ALKPHOSPHAT 113 (H) 12/21/2023 07:10 PM    ASTSGOT 27 12/21/2023 07:10 PM    ALTSGPT 35 12/21/2023 07:10 PM    TBILIRUBIN 0.4 12/21/2023 07:10 PM    INR 1.04 12/21/2023 07:10 PM    ALBUMIN 4.4 12/21/2023 07:10 PM      Lab Results   Component Value Date/Time    CHOLSTRLTOT 180 03/30/2021 04:46 AM     (H) 03/30/2021 04:46 AM    HDL 32 (A) 03/30/2021 04:46 AM    TRIGLYCERIDE 210 (H) 03/30/2021 04:46 AM       Lab Results   Component Value Date/Time    MALBCRT 14 03/01/2010 10:51 AM    MICROALBUR 3.4 03/01/2010 10:51 AM       Physical Examination:  Vital signs: /88   Pulse 79  There is no height or weight on file to calculate BMI.    Assessment and Plan:    1. DM2  Discussed Goals: FBG 80 - 130, 2hPP < 180, a1c < 7.0%  Last a1c drawn on 5/24/24 was 8.9%, which is not at goal and has improved since the previous reading  Patient reports hypotension that began occurring when his dose of Telmisartan was increased to 80 mg. Monitors BP at home 2x a week. Will continue to monitor.   Per patient reported BG levels, his postprandial readings are still above goal. Will increase dose of ozempic to 0.5 mg weekly with the goal of better controlling these.      - Medication changes:  Increase Ozempic to 0.5 mg weekly  - Continue:  Thiazolidinedione: pioglitazone (Actos) 30 mg daily   Basal Insulin: Lantus 8 units QHS  SGLT2: Jardiance 25 mg daily    - Lifestyle changes:  Exercise Goal - increase physical activity with goal of 150 min of mod intensity exercise per week   Dietary Goal - Encouraged pt to eat smaller servings more frequently to decrease SE from his Ozempic dose  increase. Maximize lean meats/fish and veggies and limit carb intake and omit empty sugars (sweets, desserts, sodas etc).     - Preventative management:  REC DM Score: 3  Care gaps addressed:  Patient working on lowering A1c with above instructions, Patient had labs done, but did not have appropriate labs completed. Will remind him at next visit.  Care gaps updated in Health Maintenance    Follow Up:  1 months    Kajal Gardner, TigreD  Tigre DavisD    CC:   JHONATHAN DICKINSON A.P.R.N. (faxed to [facility])  Skinny Mckeon MD

## 2024-08-26 ENCOUNTER — HOSPITAL ENCOUNTER (OUTPATIENT)
Dept: RADIOLOGY | Facility: MEDICAL CENTER | Age: 64
End: 2024-08-26
Attending: NURSE PRACTITIONER
Payer: MEDICAID

## 2024-08-26 DIAGNOSIS — M79.89 LEG SWELLING: ICD-10-CM

## 2024-08-26 PROCEDURE — 93970 EXTREMITY STUDY: CPT

## 2024-09-18 ENCOUNTER — NON-PROVIDER VISIT (OUTPATIENT)
Dept: VASCULAR LAB | Facility: MEDICAL CENTER | Age: 64
End: 2024-09-18
Attending: INTERNAL MEDICINE
Payer: MEDICAID

## 2024-09-18 DIAGNOSIS — E11.9 TYPE 2 DIABETES MELLITUS WITHOUT COMPLICATION, UNSPECIFIED WHETHER LONG TERM INSULIN USE (HCC): ICD-10-CM

## 2024-09-18 LAB
HBA1C MFR BLD: 9.4 % (ref ?–5.8)
POCT INT CON NEG: NEGATIVE
POCT INT CON POS: POSITIVE

## 2024-09-18 PROCEDURE — 99212 OFFICE O/P EST SF 10 MIN: CPT

## 2024-09-18 PROCEDURE — 83036 HEMOGLOBIN GLYCOSYLATED A1C: CPT

## 2024-09-18 RX ORDER — SEMAGLUTIDE 1.34 MG/ML
1 INJECTION, SOLUTION SUBCUTANEOUS
Qty: 3 ML | Refills: 5 | Status: SHIPPED | OUTPATIENT
Start: 2024-09-18

## 2024-09-18 NOTE — PROGRESS NOTES
"Patient Consult Note    Primary care physician: JHONATHAN DICKINSON A.P.R.N.    Reason for consult: Management of Uncontrolled Type 2 Diabetes    HPI:  Marcello Perez is a 64 y.o. old patient who comes in today for evaluation of above stated problem.    Allergies  Glyburide, Hctz [hydrochlorothiazide], Lantus [insulin glargine], Metformin, and Trulicity [dulaglutide]    Current Diabetes Medication Regimen  GLP-1 or GLP-1/GIP Analog: semaglutide (Ozempic) 0.5 mg weekly  SGLT-2 Inhibitor: empagliflozin (Jardiance) 25 mg daily  Thiazolidinedione: pioglitazone (Actos) 30 mg daily    Basal Insulin: Glargine (Lantus or Basaglar? - pt unsure) 8 units QHS    Previous Diabetes Medications and Reason for Discontinuation  Glyburide - GI upset  Lantus - \"eye burning\" if dose > 20 units  Metformin - GI upset w/ both IR & ER formulations  Trulicity - GI upset (gas, bloating)    Potential Barriers to Care:  Adherence: denies missed doses  Side effects: Diarrhea w/ Ozempic (will start fiber supplement), nausea w/ Jardiance (administers w/ food), noted weight gain w/ initiation of TZD (starting to decrease)  Affordability: No issues - pt w/ Medicaid    SMBG  Pt has home glucometer and proper testing technique - Yes  Discussed BG Goals: FBG 80 - 130, 2hPP < 180, A1c < 7%    Pt reports blood sugars:   Before Breakfast: 140, 130, 135, occasionally 180    Hypoglycemia  Hypoglycemia awareness: Yes  Nocturnal hypoglycemia: None  Hypoglycemia:  None  Pt's treatment of Hypoglycemia  Discussed 15:15 Rule    Lifestyle  Physical Activity:  Current Exercise - Walking most days of the week. Typically 3-4 miles.  Exercise Goal - At least 150 min/week of anything aerobic.    Dietary: Common adult - following w/ nutritionist. Pt reports reducing carbs and portion sizes.  Breakfast: once every 3 days, will make breakfast. Eggs, avocado, dates, apples.   Meals:  meals on wheels, will often skip meals here and there  Snacks: tuna fish salad, " fish, leftovers, cereal, coleslaw, nuts, PB  Drinks: water, tea (unsweetened), black coffee, milk (states very occasionally)    Preventative Management  BP regimen (ACEi/ARB): Telmisartan 80 mg once daily  Statin: atorvastatin (Lipitor) 40 mg daily  Last Microalbumin/Cr:  Lab Results   Component Value Date/Time    MALBCRT 14 03/01/2010 10:51 AM    MICROALBUR 3.4 03/01/2010 10:51 AM     A1c Hx:  Lab Results   Component Value Date/Time    HBA1C 9.4 (A) 09/18/2024 12:00 AM    HBA1C 8.9 (A) 05/24/2024 03:00 PM    HBA1C 10.2 (A) 02/06/2024 12:00 AM      Last Retinal Scan: 6/13/2024 - see media    Monofilament exam: Follows w/ podiatry Q30-60D - going later today.    Labs  Lab Results   Component Value Date/Time    SODIUM 141 12/21/2023 07:10 PM    POTASSIUM 3.6 12/21/2023 07:10 PM    CHLORIDE 99 12/21/2023 07:10 PM    CO2 26 12/21/2023 07:10 PM    GLUCOSE 220 (H) 12/21/2023 07:10 PM    BUN 28 (H) 12/21/2023 07:10 PM    CREATININE 0.96 12/21/2023 07:10 PM    CREATININE 0.9 06/18/2008 09:15 AM    BUNCREATRAT 27 (H) 04/22/2015 10:28 AM     Lab Results   Component Value Date/Time    ALKPHOSPHAT 113 (H) 12/21/2023 07:10 PM    ASTSGOT 27 12/21/2023 07:10 PM    ALTSGPT 35 12/21/2023 07:10 PM    TBILIRUBIN 0.4 12/21/2023 07:10 PM    INR 1.04 12/21/2023 07:10 PM    ALBUMIN 4.4 12/21/2023 07:10 PM        Physical Examination:  Vital signs: There were no vitals taken for this visit. There is no height or weight on file to calculate BMI.    Assessment and Plan:    1. DM2  Since last visit, pt was able to increase Ozempic up to 0.5 mg weekly. States he had some initial CP and abdominal pain. CP was intermittent and has NOT persisted. Recommended pt go to the ED if this reoccurs. Pt states this occurred w/ initiation of GLP1 and w/ dose increase.   Discussed Goals: FBG 80 - 130, 2hPP < 180, a1c < 7%   Most recent a1c drawn today was 9.4%, which is not at goal and INCREASED from last (8.9% on 5/2024).  Pt reported FBG is elevated above  goal.   Pt reports that he leads a very active lifestyle and eats a balanced diet. Follows w/ Nutrition services.  Pt had many questions/concerns regarding his medications and respective ADR's today - answered accordingly to his satisfaction.  Pt states that some time ago, he followed w/ a Dr. Hernández at [facility] who allowed him to DC all DM medications and focus on lifestyle modification. Pt reports that his BG normalized w/ this course of tx. He would like to try this again - advised pt that given his A1c today that I do not recommend Dcing his DM medications.  Pt initially resistant to increasing Ozempic dose, offered to titrate other DM medications - specifically his insulin today given need for immediate glycemic control. Pt declined.  After discussion, pt amenable to further titration of Ozempic - advised him to alert us if he has persistent ADR's w/ dose increase. He will use one more dose of 0.5 mg then increase up to 1 mg weekly dose.  Would like to optimize this medication and down-titrate (and possibly eventually DC) his insulin.    - Medication changes:  INCREASE Ozempic up to 1 mg subQ once weekly   - Continue:  Jardiance 25 mg once daily  Pioglitazone 30 mg once daily  Glargine 8 units QHS    - Lifestyle changes:  Diet: Maximize lean proteins and veggies. Cut out/down on carbs. Avoid simple sugars.   Exercise: Increase as tolerated. Aim for at least 150 min/week of anything aerobic.    - Preventative management:  REC DM Score: N/a  Care gaps addressed:   A1c is above 8%: Optimized DM medications/management  Care gaps updated in Health Maintenance    Follow Up:  1 months    Isiah Cavazos, PharmD, BCACP    CC:   JHONATHAN DICKINSON A.P.R.N.

## 2024-10-02 ENCOUNTER — PATIENT MESSAGE (OUTPATIENT)
Dept: HEALTH INFORMATION MANAGEMENT | Facility: OTHER | Age: 64
End: 2024-10-02

## 2024-10-08 ENCOUNTER — TELEPHONE (OUTPATIENT)
Dept: VASCULAR LAB | Facility: MEDICAL CENTER | Age: 64
End: 2024-10-08
Payer: MEDICAID

## 2024-10-16 ENCOUNTER — DOCUMENTATION (OUTPATIENT)
Dept: VASCULAR LAB | Facility: MEDICAL CENTER | Age: 64
End: 2024-10-16
Payer: MEDICAID

## 2024-10-24 ENCOUNTER — APPOINTMENT (OUTPATIENT)
Dept: VASCULAR LAB | Facility: MEDICAL CENTER | Age: 64
End: 2024-10-24
Attending: INTERNAL MEDICINE
Payer: MEDICAID

## 2024-11-25 ENCOUNTER — NON-PROVIDER VISIT (OUTPATIENT)
Dept: VASCULAR LAB | Facility: MEDICAL CENTER | Age: 64
End: 2024-11-25
Attending: INTERNAL MEDICINE
Payer: MEDICAID

## 2024-11-25 VITALS — BODY MASS INDEX: 28.7 KG/M2 | WEIGHT: 200 LBS

## 2024-11-25 DIAGNOSIS — E11.9 TYPE 2 DIABETES MELLITUS WITHOUT COMPLICATION, UNSPECIFIED WHETHER LONG TERM INSULIN USE (HCC): ICD-10-CM

## 2024-11-25 PROCEDURE — 99212 OFFICE O/P EST SF 10 MIN: CPT

## 2024-11-25 RX ORDER — ACYCLOVIR 400 MG/1
1 TABLET ORAL SEE ADMIN INSTRUCTIONS
Qty: 2 EACH | Refills: 3 | Status: SHIPPED | OUTPATIENT
Start: 2024-11-25

## 2024-11-25 ASSESSMENT — FIBROSIS 4 INDEX: FIB4 SCORE: 1.26

## 2024-11-25 NOTE — PROGRESS NOTES
"Patient Consult Note    Primary care physician: JHONATHAN DICKINSON A.P.R.N.    Reason for consult: Management of Uncontrolled Type 2 Diabetes    HPI:  Marcello Perez is a 64 y.o. old patient who comes in today for evaluation of above stated problem.    Allergies  Glyburide, Hctz [hydrochlorothiazide], Lantus [insulin glargine], Metformin, and Trulicity [dulaglutide]    Current Diabetes Medication Regimen  GLP-1 or GLP-1/GIP Analog: SGLT-2 Inhibitor: empagliflozin (Jardiance) 25 mg daily  Thiazolidinedione: pioglitazone (Actos) 30 mg daily     Basal Insulin: Glargine (Lantus or Basaglar? - pt unsure) 8 units QHS    Previous Diabetes Medications and Reason for Discontinuation  Glyburide - GI upset  Lantus - \"eye burning\" if dose > 20 units  Metformin - GI upset w/ both IR & ER formulations  Trulicity - GI upset (gas, bloating)    Potential Barriers to Care:  Adherence: denies missed doses  Side effects: was experiencing significant side effects from Ozempic and self-discontinued about 1 month ago. He was having significant abdominal swelling, which improved with discontinuation.   Affordability: none     SMBG  Pt has home glucometer and proper testing technique - yes     Pt reports blood sugars:   Before Breakfast: 120s - 130s   Today 182 (had a burger and fries for dinner yesterday)     Hypoglycemia  Hypoglycemia awareness: Yes  Nocturnal hypoglycemia: None  Hypoglycemia:  None  Pt's treatment of Hypoglycemia  Discussed 15:15 Rule    Lifestyle  Current Exercise - walking, is getting exposed to second hand smoke at home which is causing some cough and congestion that is making it difficult.     Dietary - mediterranean diet, lots of fish, rice, pork, chicken, steak   Fruits and vegetables   Drinks only water and tea     Labs  Lab Results   Component Value Date/Time    HBA1C 9.4 (A) 09/18/2024 12:00 AM    HBA1C 8.9 (A) 05/24/2024 03:00 PM    HBA1C 10.2 (A) 02/06/2024 12:00 AM      Lab Results   Component Value " Date/Time    SODIUM 141 12/21/2023 07:10 PM    POTASSIUM 3.6 12/21/2023 07:10 PM    CHLORIDE 99 12/21/2023 07:10 PM    CO2 26 12/21/2023 07:10 PM    GLUCOSE 220 (H) 12/21/2023 07:10 PM    BUN 28 (H) 12/21/2023 07:10 PM    CREATININE 0.96 12/21/2023 07:10 PM    CREATININE 0.9 06/18/2008 09:15 AM    BUNCREATRAT 27 (H) 04/22/2015 10:28 AM     Lab Results   Component Value Date/Time    ALKPHOSPHAT 113 (H) 12/21/2023 07:10 PM    ASTSGOT 27 12/21/2023 07:10 PM    ALTSGPT 35 12/21/2023 07:10 PM    TBILIRUBIN 0.4 12/21/2023 07:10 PM    INR 1.04 12/21/2023 07:10 PM    ALBUMIN 4.4 12/21/2023 07:10 PM      Lab Results   Component Value Date/Time    CHOLSTRLTOT 180 03/30/2021 04:46 AM     (H) 03/30/2021 04:46 AM    HDL 32 (A) 03/30/2021 04:46 AM    TRIGLYCERIDE 210 (H) 03/30/2021 04:46 AM       Lab Results   Component Value Date/Time    MALBCRT 14 03/01/2010 10:51 AM    MICROALBUR 3.4 03/01/2010 10:51 AM       Physical Examination:  Vital signs: There were no vitals taken for this visit. There is no height or weight on file to calculate BMI.    Assessment and Plan:    1. DM2  Discussed Goals: FBG 80 - 130, 2hPP < 180, a1c < 7.0%  Last a1c drawn on 9/18 was 9.4%, which is not at goal and has worsened since the previous reading  Patient presents today for continuation of his diabetes care. He experienced significant adverse effects from his increase to Ozempic, and has now been off of it for 1 month, resolving all adverse effects. He is not interested in retrying at this time, even at lower doses. He reports that he has switched completely to the mediterranean diet with only occasional excursions, which he notes in his morning blood sugars. He has consistently had blood glucoses in the 110s to 130s in the morning, therefore I do not think it would be appropriate to increase Lantus at this time. He has a goal to decrease and eventually stop medications, and understands that consistent diet and exercise are key to this.      - Medication changes:  NONE   - Continue:  Jardiance 25mg daily   Pioglitazone 30mg daily   Insulin glargine 8 units QPM     - Lifestyle changes:  Exercise Goal - continue to increase as tolerated with goal of 150 minutes per week. Patient walks daily.   Dietary Goal - continue mediterranean diet. Focus on lean meats and proteins, avoiding carbs and simple sugars.     - Preventative management:  REC DM Score: 1  Care gaps addressed:   A1c is above 8%: Optimized DM medications/management  Care gaps updated in Health Maintenance    Follow Up:  4 weeks for A1c check    Latia Smith, PharmD    CC:   JHONATHAN DICKINSON A.P.R.N.

## 2024-12-16 DIAGNOSIS — E11.65 UNCONTROLLED TYPE 2 DIABETES MELLITUS WITH HYPERGLYCEMIA (HCC): ICD-10-CM

## 2024-12-30 ENCOUNTER — NON-PROVIDER VISIT (OUTPATIENT)
Dept: VASCULAR LAB | Facility: MEDICAL CENTER | Age: 64
End: 2024-12-30
Attending: INTERNAL MEDICINE
Payer: MEDICAID

## 2024-12-30 DIAGNOSIS — E11.9 TYPE 2 DIABETES MELLITUS WITHOUT COMPLICATION, UNSPECIFIED WHETHER LONG TERM INSULIN USE (HCC): Primary | ICD-10-CM

## 2024-12-30 LAB
HBA1C MFR BLD: 9.8 % (ref ?–5.8)
POCT INT CON NEG: NEGATIVE
POCT INT CON POS: POSITIVE

## 2024-12-30 PROCEDURE — 83036 HEMOGLOBIN GLYCOSYLATED A1C: CPT

## 2024-12-30 PROCEDURE — 99212 OFFICE O/P EST SF 10 MIN: CPT

## 2024-12-30 RX ORDER — ACYCLOVIR 400 MG/1
1 TABLET ORAL SEE ADMIN INSTRUCTIONS
Qty: 3 EACH | Refills: 3 | Status: SHIPPED | OUTPATIENT
Start: 2024-12-30

## 2024-12-30 RX ORDER — ACYCLOVIR 800 MG/1
TABLET ORAL
Status: CANCELLED | OUTPATIENT
Start: 2024-12-30

## 2024-12-30 NOTE — PROGRESS NOTES
"Patient Consult Note    Primary care physician: JHONATHAN DICKINSON A.P.R.N.    Reason for consult: Management of Uncontrolled Type 2 Diabetes    HPI:  Marcello Perez is a 64 y.o. old patient who comes in today for evaluation of above stated problem.    Allergies  Glyburide, Hctz [hydrochlorothiazide], Lantus [insulin glargine], Metformin, and Trulicity [dulaglutide]    Current Diabetes Medication Regimen  GLP-1 or GLP-1/GIP Analog: SGLT-2 Inhibitor: empagliflozin (Jardiance) 25 mg daily  Thiazolidinedione: pioglitazone (Actos) 30 mg daily     Basal Insulin: Glargine (Lantus or Basaglar? - pt unsure) 8 units QHS    Previous Diabetes Medications and Reason for Discontinuation  Glyburide - GI upset  Lantus - \"eye burning\" if dose > 20 units  Metformin - GI upset w/ both IR & ER formulations  Trulicity - GI upset (gas, bloating)    Potential Barriers to Care:  Adherence: denies missed doses  Side effects: was experiencing significant side effects from Ozempic and self-discontinued about 1 month ago. He was having significant abdominal swelling, which improved with discontinuation.   Affordability: none     SMBG  Pt has home glucometer and proper testing technique - yes     Pt reports blood sugars:   Before Breakfast: 120s - 200s      Hypoglycemia  Hypoglycemia awareness: Yes  Nocturnal hypoglycemia: None  Hypoglycemia:  None  Pt's treatment of Hypoglycemia  Discussed 15:15 Rule    Lifestyle  Current Exercise - walking, is getting exposed to second hand smoke at home which is causing some cough and congestion that is making it difficult.     Dietary - mediterranean diet, lots of fish, rice, pork, chicken, steak   Fruits and vegetables   Drinks only water and tea     Labs  Lab Results   Component Value Date/Time    HBA1C 9.8 (A) 12/30/2024 12:00 AM    HBA1C 9.4 (A) 09/18/2024 12:00 AM    HBA1C 8.9 (A) 05/24/2024 03:00 PM      Lab Results   Component Value Date/Time    SODIUM 141 12/21/2023 07:10 PM    POTASSIUM 3.6 " 12/21/2023 07:10 PM    CHLORIDE 99 12/21/2023 07:10 PM    CO2 26 12/21/2023 07:10 PM    GLUCOSE 220 (H) 12/21/2023 07:10 PM    BUN 28 (H) 12/21/2023 07:10 PM    CREATININE 0.96 12/21/2023 07:10 PM    CREATININE 0.9 06/18/2008 09:15 AM    BUNCREATRAT 27 (H) 04/22/2015 10:28 AM     Lab Results   Component Value Date/Time    ALKPHOSPHAT 113 (H) 12/21/2023 07:10 PM    ASTSGOT 27 12/21/2023 07:10 PM    ALTSGPT 35 12/21/2023 07:10 PM    TBILIRUBIN 0.4 12/21/2023 07:10 PM    INR 1.04 12/21/2023 07:10 PM    ALBUMIN 4.4 12/21/2023 07:10 PM      Lab Results   Component Value Date/Time    CHOLSTRLTOT 180 03/30/2021 04:46 AM     (H) 03/30/2021 04:46 AM    HDL 32 (A) 03/30/2021 04:46 AM    TRIGLYCERIDE 210 (H) 03/30/2021 04:46 AM       Lab Results   Component Value Date/Time    MALBCRT 14 03/01/2010 10:51 AM    MICROALBUR 3.4 03/01/2010 10:51 AM       Physical Examination:  Vital signs: There were no vitals taken for this visit. There is no height or weight on file to calculate BMI.    Assessment and Plan:    1. DM2  Discussed Goals: FBG 80 - 130, 2hPP < 180, a1c < 7.0%  Last a1c drawn on 12/30/24 was 9.8%, which is not at goal and has worsened since the previous reading  Patient presents today for continuation of his diabetes care. He has experienced adverse effects to many DM medications listed above and will need to have further adjusting of insulin.  Discussed working on increasing the amount of walking he is doing to try and help with blood glucose. Patient is walking twice a week and plans to increase to 4 x week.  Discussed the use of a CGM and related how it could be helpful for better glucose control. Patient was excited to hear about it and would like to use a CGM.   I spoke with patient about increasing his glargine to 12 units but the patient was adamant about only going to 10 as he says that any more and it makes him feel off. I suggested that we could possibly split the dose and take half in AM and half PM  and patient says that he will think about it.   If patient able to get CGM and have sufficient data then consider adding rapid acting insulin based on glucose levels.     - Medication changes:  Increase Glargine to 10 units QPM  - Continue:  Jardiance 25mg daily   Pioglitazone 30mg daily     - Lifestyle changes:  Exercise Goal - continue to increase as tolerated with goal of 150 minutes per week. Patient walks daily.   Dietary Goal - continue mediterranean diet. Focus on lean meats and proteins, avoiding carbs and simple sugars.     - Preventative management:  REC DM Score: 1  Care gaps addressed:   A1c is above 8%: Optimized DM medications/management  Care gaps updated in Health Maintenance    Follow Up:  4 weeks for A1c check    Emery Solis, PharmD    CC:   JHONATHAN DICKINSON A.P.R.N.

## 2025-02-26 ENCOUNTER — TELEPHONE (OUTPATIENT)
Dept: VASCULAR LAB | Facility: MEDICAL CENTER | Age: 65
End: 2025-02-26
Payer: MEDICAID

## 2025-02-26 NOTE — TELEPHONE ENCOUNTER
Pt missed their diabetes appointment on 02/25/25.    Rescheduled appt to 3/7    1st attempt    Discharge criteria:  3 calls (including at least once to emergency contacts)? No   1 letter (with the third call)? No   Over 3 months (last seen 12/30/24, if no response by 03/30/25 and after 3 calls + 1 letter, will d/c from clinic)? No       Oneyda Linn, PharmD

## 2025-03-07 ENCOUNTER — NON-PROVIDER VISIT (OUTPATIENT)
Dept: VASCULAR LAB | Facility: MEDICAL CENTER | Age: 65
End: 2025-03-07
Attending: INTERNAL MEDICINE
Payer: MEDICAID

## 2025-03-07 PROCEDURE — 99212 OFFICE O/P EST SF 10 MIN: CPT

## 2025-03-07 NOTE — PROGRESS NOTES
"Patient Consult Note    Primary care physician: JHONATHAN DICKINSON A.P.R.N.    Reason for consult: Management of Uncontrolled Type 2 Diabetes    HPI:  Marcello Perez is a 64 y.o. old patient who comes in today for evaluation of above stated problem.    Allergies  Glyburide, Hctz [hydrochlorothiazide], Lantus [insulin glargine], Metformin, and Trulicity [dulaglutide]    Current Diabetes Medication Regimen  SGLT-2 Inhibitor: empagliflozin (Jardiance) 25 mg daily  Thiazolidinedione: pioglitazone (Actos) 30 mg daily     Basal Insulin: Glargine 10 units QHS    Previous Diabetes Medications and Reason for Discontinuation  Glyburide - GI upset  Lantus - \"eye burning\" if dose > 20 units  Metformin - GI upset w/ both IR & ER formulations  Trulicity - GI upset (gas, bloating)    Potential Barriers to Care:  Adherence: denies missed doses  Side effects: was experiencing significant side effects from Ozempic and self-discontinued about 1 month ago. He was having significant abdominal swelling, which improved with discontinuation.   Affordability: none     SMBG  Pt has home glucometer and proper testing technique - yes     Pt reports blood sugars:   Before Breakfast: 120s - 200s reports average of approximately 138  Afternoons 2 hours post-prandial: 150s       Hypoglycemia  Hypoglycemia awareness: Yes  Nocturnal hypoglycemia: None  Hypoglycemia:  None  Pt's treatment of Hypoglycemia  Discussed 15:15 Rule    Lifestyle  Current Exercise - walking patient has increased from walking 2 times/week to 3 times/week, is getting exposed to second hand smoke at home which is causing some cough and congestion that is making it difficult. Pt has been working on moving so he has been packing boxes.     Dietary - mediterranean diet, lots of fish, rice, pork, chicken, steak   Fruits and vegetables   Drinks only water and tea     Labs  Lab Results   Component Value Date/Time    HBA1C 9.8 (A) 12/30/2024 12:00 AM    HBA1C 9.4 (A) 09/18/2024 " 12:00 AM    HBA1C 8.9 (A) 05/24/2024 03:00 PM      Lab Results   Component Value Date/Time    SODIUM 141 12/21/2023 07:10 PM    POTASSIUM 3.6 12/21/2023 07:10 PM    CHLORIDE 99 12/21/2023 07:10 PM    CO2 26 12/21/2023 07:10 PM    GLUCOSE 220 (H) 12/21/2023 07:10 PM    BUN 28 (H) 12/21/2023 07:10 PM    CREATININE 0.96 12/21/2023 07:10 PM    CREATININE 0.9 06/18/2008 09:15 AM    BUNCREATRAT 27 (H) 04/22/2015 10:28 AM     Lab Results   Component Value Date/Time    ALKPHOSPHAT 113 (H) 12/21/2023 07:10 PM    ASTSGOT 27 12/21/2023 07:10 PM    ALTSGPT 35 12/21/2023 07:10 PM    TBILIRUBIN 0.4 12/21/2023 07:10 PM    INR 1.04 12/21/2023 07:10 PM    ALBUMIN 4.4 12/21/2023 07:10 PM      Lab Results   Component Value Date/Time    CHOLSTRLTOT 180 03/30/2021 04:46 AM     (H) 03/30/2021 04:46 AM    HDL 32 (A) 03/30/2021 04:46 AM    TRIGLYCERIDE 210 (H) 03/30/2021 04:46 AM       Lab Results   Component Value Date/Time    MALBCRT 14 03/01/2010 10:51 AM    MICROALBUR 3.4 03/01/2010 10:51 AM       Physical Examination:  Vital signs: There were no vitals taken for this visit. There is no height or weight on file to calculate BMI.    Assessment and Plan:    1. DM2  Discussed Goals: FBG 80 - 130, 2hPP < 180, a1c < 7.0%  Last a1c drawn on 12/30/24 was 9.8%, which is not at goal and has worsened since the previous reading  Patient presents today for continuation of his diabetes care. He has experienced adverse effects to many DM medications listed above and will need to have further adjusting of insulin.  Discussed working on increasing the amount of walking he is doing to try and help with blood glucose. Patient was previously walking twice a week and has since increased to 3 times/week. Patient is also getting increased exercise secondary to packing boxes in preparation for an upcoming move.   Discussed increasing long acting insulin dose to 12 units; however, patient would prefer to not make any adjustments to his medication  regimen at this time, opting instead to wait for next A1c result prior to making further adjustments.   At follow-up appointment may consider increase in insulin dose to 12 units vs increase in pioglitazone dose to 45 mg vs initiation of Glipizide ER 2.5 mg.  Pt would likely be more open to increase in pioglitazone dose at this time which may only have marginal benefit.     - Medication changes:  Increase Glargine to 10 units QPM  - Continue:  Jardiance 25mg daily   Pioglitazone 30mg daily     - Lifestyle changes:  Exercise Goal - continue to increase as tolerated with goal of 150 minutes per week. Patient walks daily.   Dietary Goal - continue mediterranean diet. Focus on lean meats and proteins, avoiding carbs and simple sugars.     - Preventative management:  REC DM Score: 1  Care gaps addressed:   A1c is above 8%: Optimized DM medications/management  Care gaps updated in Health Maintenance    Follow Up:  4 weeks for A1c check    Ildefonso Reinoso, PharmD

## 2025-04-14 ENCOUNTER — TELEPHONE (OUTPATIENT)
Dept: VASCULAR LAB | Facility: MEDICAL CENTER | Age: 65
End: 2025-04-14
Payer: COMMERCIAL

## 2025-04-14 NOTE — TELEPHONE ENCOUNTER
Pt missed their diabetes appointment on 04/11/25.    Rescheduled appt to 05/02    1st attempt    Discharge criteria:  3 calls (including at least once to emergency contacts)? No   1 letter (with the third call)? No   Over 3 months (last seen 03/07/25, if no response by 06/07/25 and after 3 calls + 1 letter, will d/c from clinic)? No     Oneyda Linn, PharmD

## 2025-05-02 ENCOUNTER — DOCUMENTATION (OUTPATIENT)
Dept: VASCULAR LAB | Facility: MEDICAL CENTER | Age: 65
End: 2025-05-02
Payer: COMMERCIAL

## 2025-05-02 NOTE — PROGRESS NOTES
Patient changed insurance - no longer covered at [facility]. Directed patient back to PCP for diabetes management.     Lizz Dennis, TigreD    CC: Jai MOSELEY  
no